# Patient Record
Sex: FEMALE | Race: WHITE | NOT HISPANIC OR LATINO | Employment: FULL TIME | URBAN - METROPOLITAN AREA
[De-identification: names, ages, dates, MRNs, and addresses within clinical notes are randomized per-mention and may not be internally consistent; named-entity substitution may affect disease eponyms.]

---

## 2017-01-24 ENCOUNTER — GENERIC CONVERSION - ENCOUNTER (OUTPATIENT)
Dept: OTHER | Facility: OTHER | Age: 57
End: 2017-01-24

## 2017-03-16 DIAGNOSIS — Z12.31 ENCOUNTER FOR SCREENING MAMMOGRAM FOR MALIGNANT NEOPLASM OF BREAST: ICD-10-CM

## 2017-03-16 DIAGNOSIS — R92.2 INCONCLUSIVE MAMMOGRAM: ICD-10-CM

## 2017-05-08 ENCOUNTER — ALLSCRIPTS OFFICE VISIT (OUTPATIENT)
Dept: OTHER | Facility: OTHER | Age: 57
End: 2017-05-08

## 2017-05-08 ENCOUNTER — GENERIC CONVERSION - ENCOUNTER (OUTPATIENT)
Dept: OTHER | Facility: OTHER | Age: 57
End: 2017-05-08

## 2017-05-08 DIAGNOSIS — Z78.0 ASYMPTOMATIC MENOPAUSAL STATE: ICD-10-CM

## 2017-05-20 ENCOUNTER — HOSPITAL ENCOUNTER (OUTPATIENT)
Dept: RADIOLOGY | Age: 57
Discharge: HOME | End: 2017-05-20
Payer: COMMERCIAL

## 2017-05-20 DIAGNOSIS — Z78.0 ASYMPTOMATIC MENOPAUSAL STATE: ICD-10-CM

## 2017-05-20 PROCEDURE — 77080 DXA BONE DENSITY AXIAL: CPT

## 2017-06-08 ENCOUNTER — GENERIC CONVERSION - ENCOUNTER (OUTPATIENT)
Dept: OTHER | Facility: OTHER | Age: 57
End: 2017-06-08

## 2017-06-19 ENCOUNTER — GENERIC CONVERSION - ENCOUNTER (OUTPATIENT)
Dept: OTHER | Facility: OTHER | Age: 57
End: 2017-06-19

## 2017-08-24 ENCOUNTER — TRANSCRIBE ORDERS (OUTPATIENT)
Dept: ADMINISTRATIVE | Facility: HOSPITAL | Age: 57
End: 2017-08-24

## 2017-08-24 ENCOUNTER — GENERIC CONVERSION - ENCOUNTER (OUTPATIENT)
Dept: OTHER | Facility: OTHER | Age: 57
End: 2017-08-24

## 2017-08-24 DIAGNOSIS — M54.12 BRACHIAL NEURITIS OR RADICULITIS NOS: Primary | ICD-10-CM

## 2017-08-31 ENCOUNTER — HOSPITAL ENCOUNTER (OUTPATIENT)
Dept: RADIOLOGY | Age: 57
Discharge: HOME | End: 2017-08-31
Payer: COMMERCIAL

## 2017-08-31 DIAGNOSIS — M54.12 BRACHIAL NEURITIS OR RADICULITIS NOS: ICD-10-CM

## 2017-08-31 PROCEDURE — 72141 MRI NECK SPINE W/O DYE: CPT

## 2017-12-28 ENCOUNTER — GENERIC CONVERSION - ENCOUNTER (OUTPATIENT)
Dept: OTHER | Facility: OTHER | Age: 57
End: 2017-12-28

## 2018-01-11 NOTE — PROGRESS NOTES
Assessment    1  Acute sinusitis, recurrence not specified, unspecified location (461 9) (J01 90)    Plan  Acute sinusitis, recurrence not specified, unspecified location    · Beconase AQ 42 MCG/SPRAY Nasal Suspension; USE 2 SPRAYS IN EACH  NOSTRIL ONCE DAILY   · Doxycycline Hyclate 100 MG Oral Capsule; Take 1 capsule twice daily    Discussion/Summary    Doxy provided given multiple abx allergies  she requested beconase nasal spray as this worked in the past    Possible side effects of new medications were reviewed with the patient/guardian today  The treatment plan was reviewed with the patient/guardian  The patient/guardian understands and agrees with the treatment plan     Follow Up with your Primary Care Provider in 1-2 days  If your symptoms worsen follow up at the nearest [place] [address] Emergency Room  History of Present Illness  Patient currently in [place], using an older model desktop computer, video connection was not available and the visit was conducted via phone call  She has had sinus congestion and worsening frontal and maxillary sinus pressure for the past 5 days  She has low grade temps and occasional chills  She denies chest tightness or sob  Mild dry cough  She has a hx of recurrent sinusitis and has had sinus surgery in the past      Review of Systems    Constitutional: as noted in HPI    ENT: as noted in HPI  Cardiovascular: no complaints of slow or fast heart rate, no chest pain, no palpitations, no leg claudication or lower extremity edema  Respiratory: cough, but as noted in HPI  Active Problems    1  Anxiety (300 00) (F41 9)   2  Chronic joint pain (719 40,338 29) (M25 50,G89 29)    Past Medical History    1  History of Acute recurrent sinusitis (461 9) (J01 91)   2  History of Back disorder (724 9) (M53 9)   3  History of Colon cancer screening (V76 51) (Z12 11)   4  History of Encounter for screening mammogram for malignant neoplasm of breast   (V76 12) (Z12 31)   5   H/O neck disorder (V13 59) (Z87 39)   6  History of arthritis (V13 4) (Z87 39)   7  History of skin disorder (V13 3) (Z87 2)   8  History of Lipids abnormal (272 9) (E78 89)   9  History of Neck pain (723 1) (M54 2)   10  History of Screening cholesterol level (V77 91) (Z13 220)   11  History of Screening for breast cancer (V76 10) (Z12 39)   12  History of Screening for colon cancer (V76 51) (Z12 11)   13  History of Screening for depression (V79 0) (Z13 89)    Family History  Mother    1  Family history of Lupus   2  Family history of Systemic lupus erythematosus  Father    3  Family history of Lung cancer    Social History    · Caffeine use (V49 89) (F15 90)   · Denied: History of Drug use   · Exercises regularly   ·    · Never a smoker   · Occupation   · Social alcohol use (Z78 9)   · Two children    Surgical History    1  History of  Section   2  History of Exploratory Laparotomy   3  History of Hernia Repair   4  History of Hysterectomy   5  History of Rotator Cuff Repair    Current Meds   1  ALPRAZolam 0 5 MG Oral Tablet; TAKE 1 TABLET DAILY AS NEEDED; Therapy: 95Ngm0565 to (Evaluate:08Xaf9059); Last Rx:57Zex5235 Ordered   2  Cefdinir 300 MG Oral Capsule; TAKE 1 CAPSULE EVERY 12 HOURS DAILY; Therapy: 82Sft6209 to (Chela Wakefield)  Requested for: 51Hae1775; Last   Rx:76Wra4722 Ordered   3  Hydrocodone-Acetaminophen 5-325 MG Oral Tablet; Therapy: (Recorded:65Ial7906) to Recorded   4  PreviDent 5000 Sensitive 1 1-5 % Dental Paste; USE AS DIRECTED; Therapy: 2013 to Recorded   5  Qnasl 80 MCG/ACT Nasal Aerosol Solution; 1 spray each nostril once daily; Therapy: 10Odo0634 to (Last Rx:54Vlv6517)  Requested for: 83Rri2775 Ordered    Allergies    1  Avelox TABS   2  Erythromycin Base TABS   3  Novocain SOLN   4  Penicillins    5   IVP Dye    Observations Made  No distress in her voice, able to complete full sentences       Future Appointments    Date/Time Provider Specialty Site   2017 04:30 PM Malachi Kraus, [facility] Internal Medicine [facility]     Signatures   Electronically signed by : Cecil Decker DO; Jan 24 2017  3:48PM EST                       (Author)

## 2018-01-13 VITALS
SYSTOLIC BLOOD PRESSURE: 128 MMHG | WEIGHT: 113 LBS | HEIGHT: 61 IN | BODY MASS INDEX: 21.34 KG/M2 | DIASTOLIC BLOOD PRESSURE: 70 MMHG

## 2018-01-13 NOTE — MISCELLANEOUS
Message   Recorded as Task   Date: 05/23/2017 10:59 AM, Created By: Hao Montenegro   Task Name: Result Follow Up   Assigned To: Vianey Gill   Regarding Patient: Katelyn Melara, Status: In Progress   CommentTiffanieia Blind - 23 May 2017 10:59 AM     TASK CREATED  Baseline DEXA with reassuring results - low bone mineral density is noted in the hip, however the lumbar spine and femur density are within normal limits  The patient has no modifiable risk factors for osteoporosis  Please recommend contiuing Ca++ 1000-1200mg/day with vit D and continued freq weight bearing exercise  Consider repeating DEXA in 2-4 yrs  Thanks   Ayse Crawford - 23 May 2017 11:01 AM     TASK IN PROGRESS   Ayse Crawford - 23 May 2017 11:08 AM     TASK EDITED  pt informed dexa results        Active Problems    1  Acute sinusitis, recurrence not specified, unspecified location (461 9) (J01 90)   2  Anxiety (300 00) (F41 9)   3  Chronic joint pain (719 40,338 29) (M25 50,G89 29)   4  Dense breasts (793 82) (R92 2)   5  Encounter for gynecological examination without abnormal finding (V72 31) (Z01 419)   6  Encounter for screening mammogram for malignant neoplasm of breast (V76 12)   (Z12 31)   7  Hot flashes (782 62) (R23 2)   8  Postmenopausal status (V49 81) (Z78 0)   9  Vaginal dryness (625 8) (N89 8)    Current Meds   1  ALPRAZolam 0 5 MG Oral Tablet; TAKE 1 TABLET DAILY AS NEEDED; Therapy: 78Act5202 to (Evaluate:91Fhr8256); Last Rx:86Kus4308 Ordered   2  Fluticasone Propionate 50 MCG/ACT Nasal Suspension (Flonase Allergy Relief); SPRAY   TWO SPRAYS INTO EACH NOSTRIL ONCE DAILY; Therapy: 22PSW1287 to (Last Veryl Comment)  Requested for: 31IBH5226 Ordered   3  Hydrocodone-Acetaminophen 5-325 MG Oral Tablet; Therapy: (Recorded:43Iyx5192) to Recorded   4  PreviDent 5000 Sensitive 1 1-5 % Dental Paste; USE AS DIRECTED; Therapy: 22Nov2013 to Recorded   5  TiZANidine HCl - 2 MG Oral Tablet;    Therapy: (Recorded:81Wzm2672) to Recorded    Allergies    1  Avelox TABS   2  Erythromycin Base TABS   3  Novocain SOLN   4  Penicillins    5   IVP Dye    Signatures   Electronically signed by : Gelacio Tompkins, ; May 23 2017 11:10AM EST                       (Author)

## 2018-01-15 NOTE — MISCELLANEOUS
Message   Recorded as Task   Date: 05/23/2017 10:59 AM, Created By: Ghada Ernandez   Task Name: Result Follow Up   Assigned To: Naresh Velásquez   Regarding Patient: Nicole Carlson, Status: In Progress   Cleo Velásquez - 23 May 2017 10:59 AM     TASK CREATED  Baseline DEXA with reassuring results - low bone mineral density is noted in the hip, however the lumbar spine and femur density are within normal limits  The patient has no modifiable risk factors for osteoporosis  Please recommend contiuing Ca++ 1000-1200mg/day with vit D and continued freq weight bearing exercise  Consider repeating DEXA in 2-4 yrs  Thanks   Ayse Crawford - 23 May 2017 11:01 AM     TASK IN PROGRESS   Ayse Crawford - 23 May 2017 11:08 AM     TASK EDITED  pt informed dexa results        Active Problems    1  Acute sinusitis, recurrence not specified, unspecified location (461 9) (J01 90)   2  Anxiety (300 00) (F41 9)   3  Chronic joint pain (719 40,338 29) (M25 50,G89 29)   4  Dense breasts (793 82) (R92 2)   5  Encounter for gynecological examination without abnormal finding (V72 31) (Z01 419)   6  Encounter for screening mammogram for malignant neoplasm of breast (V76 12)   (Z12 31)   7  Hot flashes (782 62) (R23 2)   8  Postmenopausal status (V49 81) (Z78 0)   9  Vaginal dryness (625 8) (N89 8)    Current Meds   1  ALPRAZolam 0 5 MG Oral Tablet; TAKE 1 TABLET DAILY AS NEEDED; Therapy: 69Ksy2996 to (Evaluate:23Uxs0486); Last Rx:15Vgn8798 Ordered   2  Fluticasone Propionate 50 MCG/ACT Nasal Suspension (Flonase Allergy Relief); SPRAY   TWO SPRAYS INTO EACH NOSTRIL ONCE DAILY; Therapy: 55GFO8639 to (Last [facility])  Requested for: 40ION8446 Ordered   3  Hydrocodone-Acetaminophen 5-325 MG Oral Tablet; Therapy: (Recorded:57Rhy2114) to Recorded   4  PreviDent 5000 Sensitive 1 1-5 % Dental Paste; USE AS DIRECTED; Therapy: 22Nov2013 to Recorded   5  TiZANidine HCl - 2 MG Oral Tablet;    Therapy: (Recorded:35Ehg8425) to Recorded    Allergies    1  Avelox TABS   2  Erythromycin Base TABS   3  Novocain SOLN   4  Penicillins    5   IVP Dye    Signatures   Electronically signed by : Juve Herzog, ; May 23 2017 11:09AM EST                       (Author)

## 2018-07-12 ENCOUNTER — OFFICE VISIT (OUTPATIENT)
Dept: INTERNAL MEDICINE CLINIC | Facility: CLINIC | Age: 58
End: 2018-07-12
Payer: COMMERCIAL

## 2018-07-12 VITALS
HEART RATE: 70 BPM | BODY MASS INDEX: 21.02 KG/M2 | TEMPERATURE: 98.3 F | WEIGHT: 114.2 LBS | HEIGHT: 62 IN | OXYGEN SATURATION: 98 % | DIASTOLIC BLOOD PRESSURE: 80 MMHG | RESPIRATION RATE: 20 BRPM | SYSTOLIC BLOOD PRESSURE: 116 MMHG

## 2018-07-12 DIAGNOSIS — F41.9 ANXIETY: Primary | ICD-10-CM

## 2018-07-12 DIAGNOSIS — G47.00 INSOMNIA, UNSPECIFIED TYPE: ICD-10-CM

## 2018-07-12 DIAGNOSIS — E78.89 LIPIDS ABNORMAL: ICD-10-CM

## 2018-07-12 PROBLEM — S46.819A STRAIN OF SUPRASPINATUS MUSCLE OR TENDON: Status: ACTIVE | Noted: 2018-07-12

## 2018-07-12 PROBLEM — M54.12 CERVICAL RADICULOPATHY: Status: ACTIVE | Noted: 2017-07-12

## 2018-07-12 PROBLEM — M47.812 CERVICAL SPONDYLOSIS WITHOUT MYELOPATHY: Status: ACTIVE | Noted: 2017-06-08

## 2018-07-12 PROBLEM — M25.519 SHOULDER JOINT PAIN: Status: ACTIVE | Noted: 2018-07-12

## 2018-07-12 PROBLEM — R92.2 DENSE BREASTS: Status: ACTIVE | Noted: 2017-05-08

## 2018-07-12 PROBLEM — R23.2 HOT FLASHES: Status: ACTIVE | Noted: 2017-05-08

## 2018-07-12 PROBLEM — N89.8 VAGINAL DRYNESS: Status: ACTIVE | Noted: 2017-05-08

## 2018-07-12 PROBLEM — M54.2 NECK PAIN: Status: ACTIVE | Noted: 2017-06-08

## 2018-07-12 PROBLEM — R92.30 DENSE BREASTS: Status: ACTIVE | Noted: 2017-05-08

## 2018-07-12 PROCEDURE — 99214 OFFICE O/P EST MOD 30 MIN: CPT | Performed by: NURSE PRACTITIONER

## 2018-07-12 RX ORDER — ALPRAZOLAM 0.25 MG/1
0.25 TABLET ORAL DAILY PRN
Qty: 30 TABLET | Refills: 0 | Status: SHIPPED | OUTPATIENT
Start: 2018-07-12 | End: 2018-11-29

## 2018-07-12 RX ORDER — ESCITALOPRAM OXALATE 5 MG/1
5 TABLET ORAL DAILY
Qty: 30 TABLET | Refills: 1 | Status: SHIPPED | OUTPATIENT
Start: 2018-07-12 | End: 2018-08-30 | Stop reason: SDUPTHER

## 2018-07-12 RX ORDER — ZOLPIDEM TARTRATE 5 MG/1
5 TABLET ORAL
Qty: 30 TABLET | Refills: 0 | Status: SHIPPED | OUTPATIENT
Start: 2018-07-12 | End: 2018-08-30 | Stop reason: SDUPTHER

## 2018-07-12 NOTE — PATIENT INSTRUCTIONS
Will check labs  Start Lexapro 5 mg daily  Start Xanax 0 25 mg daily as needed for anxiety  Restart Ambien 5 mg q h s  As needed  Do not take Ambien and xanax together    Discussed with patient the Xanax will only be a temporary prescription until the long-term medication becomes effective  Goal is once the anxiety is under control she will be able to stop the Xanax and the Ambien    Patient to follow up in one month, sooner if needed

## 2018-07-12 NOTE — PROGRESS NOTES
Assessment/Plan:    Patient presents to establish care and follow-up on anxiety insomnia  She was last seen in 2016  She reports she has been having increased anxiety and insomnia the past few months  Patient previously on Ambien in the past and is requesting restart  She reports she is only getting about three 4 hr of sleep a night  She has tried over-the-counter melatonin without success  Additionally patient reports that her anxiety has been increasing  She is hesitant to start long-term medication due to side effects and she reports that she has been on them in the past without success  She is agreeable to starting a low-dose Lexapro  Will start 5 mg daily  No increased at this time  Additionally will start Xanax 0 25 mg as needed on a daily basis  Patient was informed this medication is only to be prescribed in the interim until her long-acting medication becomes effective  Goal is to decrease anxiety in hopes that will improve her insomnia  Discussed with patient she should not take Xanax and Ambien at the same time  She is to continue with good sleep hygiene  Will update routine labs and patient to follow up in one month, sooner if needed  Patient's ran on PDMP prior to rx  Diagnoses and all orders for this visit:    Anxiety  -     CBC and differential; Future  -     Comprehensive metabolic panel; Future  -     TSH, 3rd generation with Free T4 reflex; Future    Lipids abnormal  -     Lipid panel; Future  -     TSH, 3rd generation with Free T4 reflex; Future        Subjective:      Patient ID: Stevo Muhammad is a 56 y o  female  Patient presents to reestablish care/ follow-up anxiety and insomnia  Patient reports that she has been having increased anxiety, overwhelmed with family stress and work  She is currently working and going back to school for another degree as well  She has two teenagers and a supportive   Anxiety   Presents for follow-up visit   Symptoms include depressed mood, insomnia, nervous/anxious behavior, panic and restlessness  Patient reports no chest pain, decreased concentration, dizziness, excessive worry, irritability, nausea, obsessions, palpitations, shortness of breath or suicidal ideas  Symptoms occur most days  The severity of symptoms is moderate  The quality of sleep is poor  Nighttime awakenings: one to two  Sydni Elmore is a 56 y o  female who complains of insomnia  Onset was a few years ago  Patient describes symptoms as frequent night time awakening, difficulty falling asleep and non-restful sleep  Patient has found no relief with going to sleep at the same time each night and melatonin use  Associated symptoms include: anxiety and fatigue  Patient denies irritability  Symptoms have gradually worsened  Patient has used Ambien in the past successfully and would like to restart    The following portions of the patient's history were reviewed and updated as appropriate: allergies, current medications, past family history, past medical history, past social history, past surgical history and problem list     Review of Systems   Constitutional: Positive for fatigue  Negative for chills, fever and irritability  Respiratory: Negative for cough, chest tightness, shortness of breath and wheezing  Cardiovascular: Negative for chest pain, palpitations and leg swelling  Gastrointestinal: Negative for constipation, diarrhea, nausea and vomiting  Neurological: Negative for dizziness, syncope and light-headedness  Psychiatric/Behavioral: Positive for sleep disturbance  Negative for behavioral problems, decreased concentration, dysphoric mood, self-injury and suicidal ideas  The patient is nervous/anxious and has insomnia  The patient is not hyperactive            Past Medical History:   Diagnosis Date    Arthritis     Last Assessed:  4/17/15    Herniated disc, cervical     Lipids abnormal     Last Assessed:  8/5/16       No current outpatient prescriptions on file  Allergies   Allergen Reactions    Ampicillin Other (See Comments)     RASH    Epinephrine     Erythromycin Other (See Comments)    Levofloxacin     Other Other (See Comments)    Penicillins     Procaine      Annotation - [id number]: When used with Epi    Moxifloxacin Rash     Swelling in lymphnodes       Social History   Past Surgical History:   Procedure Laterality Date     SECTION      EXPLORATORY LAPAROTOMY  1979    HERNIA REPAIR      Last Assessed:  4/17/15    HYSTERECTOMY      Partial    ROTATOR CUFF REPAIR      Last Assessed:  4/17/15     Family History   Problem Relation Age of Onset    Lupus Mother     Other Mother         Rheumatic disease    Lung cancer Father     Blindness Brother     Heart attack Brother     Stroke Brother        Objective:  /80 (BP Location: Left arm, Patient Position: Sitting, Cuff Size: Adult)   Pulse 70   Temp 98 3 °F (36 8 °C) (Oral)   Resp 20   Ht 5' 1 5" (1 562 m)   Wt 51 8 kg (114 lb 3 2 oz)   SpO2 98%   BMI 21 23 kg/m²      Physical Exam   Constitutional: She is oriented to person, place, and time  She appears well-developed and well-nourished  No distress  Neck: No thyromegaly present  Cardiovascular: Normal rate and regular rhythm  Pulmonary/Chest: Effort normal and breath sounds normal  No respiratory distress  She has no wheezes  Neurological: She is alert and oriented to person, place, and time  No focal deficit   Skin: Skin is warm and dry  No rash noted  Psychiatric: Her speech is normal and behavior is normal  Judgment and thought content normal  Her mood appears anxious  Her affect is not angry  She is not agitated and not aggressive  Thought content is not paranoid  She does not exhibit a depressed mood  She expresses no homicidal and no suicidal ideation  Nursing note and vitals reviewed

## 2018-07-21 ENCOUNTER — TELEPHONE (OUTPATIENT)
Dept: INTERNAL MEDICINE CLINIC | Age: 58
End: 2018-07-21

## 2018-07-21 NOTE — TELEPHONE ENCOUNTER
Pt called  I saw pt and started lexapro, xanax and ambien  She reports that her anxiety is doing better, not needing to take xanax often  Anxiety if contributing to insomnia  D/w pt increasing lexapro to 10mg daily however she is hesitant and wants to wait  No increase in [place] at this time  Offered pt a sooner follow-up than 8/30  She will call back if she wants sooner appt  No quesitons  Will done task

## 2018-07-21 NOTE — TELEPHONE ENCOUNTER
Patient was seen a week ago, given Ambien for sleep 5 mg, also Lexapro and Xanax  Patient is having great difficulty sleeping  She is able to fall asleep but cannot stay asleep for very long

## 2018-08-30 ENCOUNTER — OFFICE VISIT (OUTPATIENT)
Dept: INTERNAL MEDICINE CLINIC | Facility: CLINIC | Age: 58
End: 2018-08-30
Payer: COMMERCIAL

## 2018-08-30 VITALS
TEMPERATURE: 98.1 F | HEIGHT: 62 IN | BODY MASS INDEX: 20.98 KG/M2 | DIASTOLIC BLOOD PRESSURE: 88 MMHG | HEART RATE: 81 BPM | OXYGEN SATURATION: 99 % | SYSTOLIC BLOOD PRESSURE: 130 MMHG | WEIGHT: 114 LBS

## 2018-08-30 DIAGNOSIS — G47.00 INSOMNIA, UNSPECIFIED TYPE: ICD-10-CM

## 2018-08-30 DIAGNOSIS — F41.9 ANXIETY: Primary | ICD-10-CM

## 2018-08-30 DIAGNOSIS — G47.09 OTHER INSOMNIA: ICD-10-CM

## 2018-08-30 PROBLEM — M47.817 LUMBOSACRAL SPONDYLOSIS WITHOUT MYELOPATHY: Status: ACTIVE | Noted: 2018-08-08

## 2018-08-30 PROCEDURE — 99214 OFFICE O/P EST MOD 30 MIN: CPT | Performed by: NURSE PRACTITIONER

## 2018-08-30 RX ORDER — ESCITALOPRAM OXALATE 5 MG/1
5 TABLET ORAL DAILY
Qty: 30 TABLET | Refills: 3 | Status: SHIPPED | OUTPATIENT
Start: 2018-08-30 | End: 2018-11-29 | Stop reason: SDUPTHER

## 2018-08-30 RX ORDER — MELOXICAM 7.5 MG/1
7.5 TABLET ORAL DAILY
COMMUNITY
End: 2018-11-29

## 2018-08-30 RX ORDER — ZOLPIDEM TARTRATE 5 MG/1
5 TABLET ORAL
Qty: 30 TABLET | Refills: 0 | Status: SHIPPED | OUTPATIENT
Start: 2018-08-30 | End: 2018-09-28 | Stop reason: SDUPTHER

## 2018-08-30 RX ORDER — HYDROCODONE BITARTRATE AND ACETAMINOPHEN 5; 325 MG/1; MG/1
1 TABLET ORAL DAILY PRN
COMMUNITY
Start: 2018-08-08 | End: 2021-03-17

## 2018-08-30 NOTE — PROGRESS NOTES
Assessment/Plan:    Anxiety:  Continue Lexapro 5 mg daily  Patient has reported improvement in her symptoms with low-dose Lexapro  Continue the stressing techniques  Patient is using Xanax 0 25 mg a few times a month  Insomnia:  Continue with Ambien 5 mg q h s  As needed  Continue with good sleep hygiene  A controlled substance contract was signed  Keep her follow-up with her neurosurgeon for back pain and possible steroid injection  Labs reviewed  Patient to follow up in four months, sooner if needed     Diagnoses and all orders for this visit:    Anxiety  -     escitalopram (LEXAPRO) 5 mg tablet; Take 1 tablet (5 mg total) by mouth daily    Other insomnia    Insomnia, unspecified type  -     zolpidem (AMBIEN) 5 mg tablet; Take 1 tablet (5 mg total) by mouth daily at bedtime as needed for sleep    Other orders  -     Cancel: Ambulatory referral to Obstetrics / Gynecology; Future  -     HYDROcodone-acetaminophen (XODOL) 5-300 MG per tablet; daily as needed  -     meloxicam (MOBIC) 7 5 mg tablet; Take 7 5 mg by mouth daily        Subjective:      Patient ID: Urvashi Vance is a 66 y o  female  Patient presents for a six week follow-up for anxiety and insomnia  Patient is doing well with the addition of Lexapro 5 mg daily  She is only taking her Xanax 0 25 mg a few times a month  Anxiety   Presents for follow-up visit  Symptoms include insomnia, nervous/anxious behavior and restlessness  Patient reports no chest pain, decreased concentration, depressed mood, dizziness, excessive worry (improved), irritability, nausea, palpitations, panic, shortness of breath or suicidal ideas  Symptoms occur occasionally  The severity of symptoms is mild  The quality of sleep is fair  Insomnia  Patient complains of insomnia  Onset was a few years ago  Patient describes symptoms as frequent night time awakening, difficulty falling asleep and non-restful sleep     Patient has tried good sleep hygiene, over-the-counter sleep aid and melatonin without improvement symptoms  Patient has found moderate relief with prescription sleep aid, ambien 5mg  Associated symptoms include: anxiety  Patient denies depression, fatigue, irritability, snoring and stress  Symptoms have gradually improved  Patient is being followed by Neurosurgery for back and neck pain  She is due to receive a steroid injection  The following portions of the patient's history were reviewed and updated as appropriate: allergies, current medications, past family history, past medical history, past social history, past surgical history and problem list     Review of Systems   Constitutional: Negative for chills, fatigue, fever and irritability  Respiratory: Negative for chest tightness, shortness of breath and wheezing  Cardiovascular: Negative for chest pain and palpitations  Gastrointestinal: Negative for abdominal pain, constipation, diarrhea, nausea and vomiting  Musculoskeletal: Positive for back pain and neck pain  Skin: Negative for rash  Neurological: Negative for dizziness and light-headedness  Psychiatric/Behavioral: Positive for sleep disturbance  Negative for agitation, behavioral problems, decreased concentration, dysphoric mood and suicidal ideas  The patient is nervous/anxious and has insomnia            Past Medical History:   Diagnosis Date    Arthritis     Last Assessed:  4/17/15    Herniated disc, cervical     Lipids abnormal     Last Assessed:  8/5/16         Current Outpatient Prescriptions:     ALPRAZolam (XANAX) 0 25 mg tablet, Take 1 tablet (0 25 mg total) by mouth daily as needed for anxiety, Disp: 30 tablet, Rfl: 0    escitalopram (LEXAPRO) 5 mg tablet, Take 1 tablet (5 mg total) by mouth daily, Disp: 30 tablet, Rfl: 3    HYDROcodone-acetaminophen (XODOL) 5-300 MG per tablet, daily as needed, Disp: , Rfl:     meloxicam (MOBIC) 7 5 mg tablet, Take 7 5 mg by mouth daily, Disp: , Rfl:     zolpidem (AMBIEN) 5 mg tablet, Take 1 tablet (5 mg total) by mouth daily at bedtime as needed for sleep, Disp: 30 tablet, Rfl: 0    Allergies   Allergen Reactions    Ampicillin Other (See Comments)     RASH    Epinephrine     Erythromycin Other (See Comments)    Levofloxacin     Other Other (See Comments)    Penicillins     Procaine      Annotation - [id number]: When used with Epi    Moxifloxacin Rash     Swelling in lymphnodes       Social History   Past Surgical History:   Procedure Laterality Date     SECTION      EXPLORATORY LAPAROTOMY      HERNIA REPAIR      Last Assessed:  4/17/15    HYSTERECTOMY      Partial    ROTATOR CUFF REPAIR      Last Assessed:  4/17/15     Family History   Problem Relation Age of Onset    Lupus Mother     Other Mother         Rheumatic disease    Lung cancer Father     Blindness Brother     Heart attack Brother     Stroke Brother        Objective:  /88 (BP Location: Right arm, Patient Position: Sitting, Cuff Size: Adult)   Pulse 81   Temp 98 1 °F (36 7 °C) (Oral)   Ht 5' 1 5" (1 562 m)   Wt 51 7 kg (114 lb)   SpO2 99%   BMI 21 19 kg/m²      Physical Exam   Constitutional: She is oriented to person, place, and time  She appears well-developed and well-nourished  No distress  Neck: No thyromegaly present  Cardiovascular: Normal rate and regular rhythm  Pulmonary/Chest: Effort normal and breath sounds normal  No respiratory distress  She has no wheezes  Neurological: She is alert and oriented to person, place, and time  No focal deficit   Skin: Skin is warm and dry  No rash noted  Psychiatric: Her speech is normal and behavior is normal  Judgment and thought content normal  Her mood appears not anxious  Her affect is not angry  She is not agitated and not aggressive  Thought content is not paranoid  She does not exhibit a depressed mood  She expresses no homicidal and no suicidal ideation  Nursing note and vitals reviewed

## 2018-09-28 DIAGNOSIS — G47.00 INSOMNIA, UNSPECIFIED TYPE: ICD-10-CM

## 2018-10-01 RX ORDER — ZOLPIDEM TARTRATE 5 MG/1
5 TABLET ORAL
Qty: 30 TABLET | Refills: 0 | Status: SHIPPED | OUTPATIENT
Start: 2018-10-01 | End: 2018-11-01 | Stop reason: SDUPTHER

## 2018-11-01 DIAGNOSIS — G47.00 INSOMNIA, UNSPECIFIED TYPE: ICD-10-CM

## 2018-11-01 NOTE — TELEPHONE ENCOUNTER
Therosas Spence per PDMP site    Last appt, 8/30/18    Next appt, none pending     Pt due for December f/u  Told to f/u in 4M

## 2018-11-02 RX ORDER — ZOLPIDEM TARTRATE 5 MG/1
5 TABLET ORAL
Qty: 30 TABLET | Refills: 0 | Status: SHIPPED | OUTPATIENT
Start: 2018-11-02 | End: 2018-11-29 | Stop reason: SDUPTHER

## 2018-11-02 NOTE — TELEPHONE ENCOUNTER
30 days sent to pharmacy  Please call and have pt schedule December appt  Told to come back in 4M  Thank you!

## 2018-11-05 NOTE — TELEPHONE ENCOUNTER
Spoke with patient  She thought she had an appointment scheduled in January  Pt was in a meeting and asked to call back and schedule after!

## 2018-11-29 ENCOUNTER — OFFICE VISIT (OUTPATIENT)
Dept: INTERNAL MEDICINE CLINIC | Facility: CLINIC | Age: 58
End: 2018-11-29
Payer: COMMERCIAL

## 2018-11-29 VITALS
HEART RATE: 85 BPM | OXYGEN SATURATION: 98 % | HEIGHT: 62 IN | DIASTOLIC BLOOD PRESSURE: 90 MMHG | WEIGHT: 113 LBS | SYSTOLIC BLOOD PRESSURE: 138 MMHG | BODY MASS INDEX: 20.8 KG/M2 | TEMPERATURE: 98 F

## 2018-11-29 DIAGNOSIS — F41.9 ANXIETY: ICD-10-CM

## 2018-11-29 DIAGNOSIS — G47.00 INSOMNIA, UNSPECIFIED TYPE: Primary | ICD-10-CM

## 2018-11-29 DIAGNOSIS — Z91.89 ENCOUNTER FOR HEPATITIS C VIRUS SCREENING TEST FOR HIGH RISK PATIENT: ICD-10-CM

## 2018-11-29 DIAGNOSIS — Z11.59 ENCOUNTER FOR HEPATITIS C VIRUS SCREENING TEST FOR HIGH RISK PATIENT: ICD-10-CM

## 2018-11-29 PROCEDURE — 99214 OFFICE O/P EST MOD 30 MIN: CPT | Performed by: NURSE PRACTITIONER

## 2018-11-29 RX ORDER — ESCITALOPRAM OXALATE 5 MG/1
5 TABLET ORAL DAILY
Qty: 90 TABLET | Refills: 1 | Status: SHIPPED | OUTPATIENT
Start: 2018-11-29 | End: 2019-06-07 | Stop reason: SDUPTHER

## 2018-11-29 RX ORDER — ZOLPIDEM TARTRATE 5 MG/1
5 TABLET ORAL
Qty: 30 TABLET | Refills: 0 | Status: SHIPPED | OUTPATIENT
Start: 2018-11-29 | End: 2019-12-16 | Stop reason: SDUPTHER

## 2018-11-29 NOTE — PROGRESS NOTES
Assessment/Plan:    Insomnia:  Stable on 5 mg Ambien as needed  Continue good sleep hygiene  Controlled substance contract on file  Patient to follow up in four months  Anxiety:  Improved with lexapro 5 mg  Patient is no longer taking Xanax  Back pain:  Patient keep her follow-up with Neurosurgery  Continue with home stretching exercises  Patient works with physical therapist   She has developed her exercise routine  Patient is due for hep C screening  Ordered  Patient follow-up in four months given she is on a controlled substance sooner if needed  Diagnoses and all orders for this visit:    Insomnia, unspecified type  -     zolpidem (AMBIEN) 5 mg tablet; Take 1 tablet (5 mg total) by mouth daily at bedtime as needed for sleep    Encounter for hepatitis C virus screening test for high risk patient  -     Hepatitis C antibody; Future    Anxiety  -     escitalopram (LEXAPRO) 5 mg tablet; Take 1 tablet (5 mg total) by mouth daily        Subjective:      Patient ID: Armando Hill is a 66 y o  female  Anxiety   Presents for follow-up visit  Symptoms include insomnia and nervous/anxious behavior  Patient reports no chest pain, decreased concentration, depressed mood, dizziness, excessive worry (improved), irritability, nausea, palpitations, panic, restlessness, shortness of breath or suicidal ideas  Symptoms occur occasionally  The severity of symptoms is mild  The quality of sleep is good  Pt is currently taking Lexapro  Tolerating well  Only side effect experiences feet sweating  Armando Hill is a 56 y o  female who complains of insomnia  Onset was a few years ago  Patient describes symptoms as frequent night time awakening, difficulty falling asleep and non-restful sleep  Patient has found no relief with going to sleep at the same time each night and melatonin use  Associated symptoms include: anxiety and fatigue   Patient denies irritability, frequent nighttim urination, depression Symptoms have improved  Patient is currently taking ambien prn 5mg with improvement in her symptoms  Pt is following with [facility] for back pain  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    The following portions of the patient's history were reviewed and updated as appropriate: allergies, current medications, past family history, past medical history, past social history, past surgical history and problem list     Review of Systems   Constitutional: Negative for irritability  Respiratory: Negative for shortness of breath  Cardiovascular: Negative for chest pain and palpitations  Gastrointestinal: Negative for nausea  Neurological: Negative for dizziness  Psychiatric/Behavioral: Negative for decreased concentration and suicidal ideas  The patient is nervous/anxious and has insomnia            Past Medical History:   Diagnosis Date    Arthritis     Last Assessed:  4/17/15    Herniated disc, cervical     Lipids abnormal     Last Assessed:  16         Current Outpatient Prescriptions:     escitalopram (LEXAPRO) 5 mg tablet, Take 1 tablet (5 mg total) by mouth daily, Disp: 90 tablet, Rfl: 1    HYDROcodone-acetaminophen (XODOL) 5-300 MG per tablet, daily as needed, Disp: , Rfl:     zolpidem (AMBIEN) 5 mg tablet, Take 1 tablet (5 mg total) by mouth daily at bedtime as needed for sleep, Disp: 30 tablet, Rfl: 0    Allergies   Allergen Reactions    Ampicillin Other (See Comments)     RASH    Epinephrine     Erythromycin Other (See Comments)    Levofloxacin     Other Other (See Comments)    Penicillins     Procaine      Annotation - [id number]: When used with Epi    Moxifloxacin Rash     Swelling in lymphnodes       Social History   Past Surgical History:   Procedure Laterality Date     SECTION      EPIDURAL BLOCK INJECTION      back   [address]      Last Assessed:  4/17/15    HYSTERECTOMY      Partial    ROTATOR CUFF REPAIR      Last Assessed:  4/17/15     Family History   Problem Relation Age of Onset    Lupus Mother     Other Mother         Rheumatic disease    Lung cancer Father     Blindness Brother     Heart attack Brother     Stroke Brother        Objective:  /90 (BP Location: Left arm, Patient Position: Sitting, Cuff Size: Adult)   Pulse 85   Temp 98 °F (36 7 °C) (Oral)   Ht 5' 1 5" (1 562 m)   Wt 51 3 kg (113 lb)   SpO2 98%   BMI 21 01 kg/m²      Physical Exam

## 2018-12-24 LAB — HCV AB SER-ACNC: NEGATIVE

## 2019-01-21 ENCOUNTER — OFFICE VISIT (OUTPATIENT)
Dept: INTERNAL MEDICINE CLINIC | Facility: CLINIC | Age: 59
End: 2019-01-21
Payer: COMMERCIAL

## 2019-01-21 VITALS
HEART RATE: 84 BPM | HEIGHT: 62 IN | SYSTOLIC BLOOD PRESSURE: 130 MMHG | DIASTOLIC BLOOD PRESSURE: 68 MMHG | OXYGEN SATURATION: 97 % | BODY MASS INDEX: 21.09 KG/M2 | WEIGHT: 114.6 LBS | TEMPERATURE: 98 F

## 2019-01-21 DIAGNOSIS — R35.0 FREQUENCY OF URINATION: ICD-10-CM

## 2019-01-21 DIAGNOSIS — N30.01 ACUTE CYSTITIS WITH HEMATURIA: Primary | ICD-10-CM

## 2019-01-21 LAB
SL AMB  POCT GLUCOSE, UA: ABNORMAL
SL AMB LEUKOCYTE ESTERASE,UA: ABNORMAL
SL AMB POCT BILIRUBIN,UA: NEGATIVE
SL AMB POCT BLOOD,UA: ABNORMAL
SL AMB POCT CLARITY,UA: ABNORMAL
SL AMB POCT COLOR,UA: ABNORMAL
SL AMB POCT KETONES,UA: NEGATIVE
SL AMB POCT NITRITE,UA: NEGATIVE
SL AMB POCT PH,UA: 7
SL AMB POCT SPECIFIC GRAVITY,UA: 1.02
SL AMB POCT URINE PROTEIN: ABNORMAL
SL AMB POCT UROBILINOGEN: 1

## 2019-01-21 PROCEDURE — 3008F BODY MASS INDEX DOCD: CPT | Performed by: INTERNAL MEDICINE

## 2019-01-21 PROCEDURE — 99213 OFFICE O/P EST LOW 20 MIN: CPT | Performed by: INTERNAL MEDICINE

## 2019-01-21 PROCEDURE — 81003 URINALYSIS AUTO W/O SCOPE: CPT | Performed by: INTERNAL MEDICINE

## 2019-01-21 PROCEDURE — 1036F TOBACCO NON-USER: CPT | Performed by: INTERNAL MEDICINE

## 2019-01-21 RX ORDER — CEPHALEXIN 500 MG/1
500 CAPSULE ORAL EVERY 12 HOURS SCHEDULED
Qty: 14 CAPSULE | Refills: 0 | Status: SHIPPED | OUTPATIENT
Start: 2019-01-21 | End: 2019-01-28

## 2019-01-21 NOTE — PROGRESS NOTES
Assessment/Plan:    Acute cystitis with hematuria  - point of care urine dipstick is positive for large blood and large leukocytes but negative for nitrite  - Will sending her urine for urinalysis and microscopy reflex to culture and sensitivity and adjust antibiotics as needed  - will start patient on Keflex 500 mg twice daily for 7 days  Patient has an allergy to multiple antibiotics including penicillins but states that she has taking cefdinir without any reaction  - she has been counseled to drink lots of water  - she should continue to use Advil with meals for pain as needed  - patient may return to the office if her symptoms do not resolve or if they worsen       Diagnoses and all orders for this visit:    Acute cystitis with hematuria  -     cephalexin (KEFLEX) 500 mg capsule; Take 1 capsule (500 mg total) by mouth every 12 (twelve) hours for 7 days  -     UA w Reflex to Microscopic w Reflex to Culture - Clinic Collect    Frequency of urination  -     POCT urine dip auto non-scope  -     cephalexin (KEFLEX) 500 mg capsule; Take 1 capsule (500 mg total) by mouth every 12 (twelve) hours for 7 days  -     UA w Reflex to Microscopic w Reflex to Culture - Clinic Collect    Other orders  -     ibuprofen (MOTRIN) 100 mg/5 mL suspension; Take 200 mg by mouth every 6 (six) hours as needed for mild pain            Subjective:      Patient ID: Josue Polk is a 66 y o  female  HPI  Patient presents with complaints of dysuria, urinary frequency, urgency, small volume urine, feeling of incomplete emptying for the past 1 week  She also has a associated suprapubic pain and hematuria that started today  She admits to back pain which is chronic and thinks that she might have had right-sided flank pain that started today as well  She denies fever, chills, night sweats, nausea, vomiting, abdominal pain, diarrhea, constipation, myalgias, arthralgias, cough, shortness of breath    She denies any history of kidney stones but states that she does not drink much water at all  The following portions of the patient's history were reviewed and updated as appropriate: allergies, current medications, past family history, past medical history, past social history, past surgical history and problem list     Review of Systems   Constitutional: Negative for activity change, chills, fatigue, fever and unexpected weight change  HENT: Negative for ear pain, postnasal drip, rhinorrhea, sinus pressure and sore throat  Eyes: Negative for pain  Respiratory: Negative for cough, choking, chest tightness, shortness of breath and wheezing  Cardiovascular: Negative for chest pain, palpitations and leg swelling  Gastrointestinal: Negative for abdominal pain, constipation, diarrhea, nausea and vomiting  Genitourinary: Positive for decreased urine volume, dysuria, frequency, hematuria and urgency  Musculoskeletal: Negative for arthralgias, back pain, gait problem, joint swelling, myalgias and neck stiffness  Skin: Negative for pallor and rash  Neurological: Negative for dizziness, tremors, seizures, syncope, light-headedness and headaches  Hematological: Negative for adenopathy  Psychiatric/Behavioral: Negative for behavioral problems           Past Medical History:   Diagnosis Date    Arthritis     Last Assessed:  4/17/15    Herniated disc, cervical     Lipids abnormal     Last Assessed:  8/5/16         Current Outpatient Prescriptions:     escitalopram (LEXAPRO) 5 mg tablet, Take 1 tablet (5 mg total) by mouth daily, Disp: 90 tablet, Rfl: 1    HYDROcodone-acetaminophen (XODOL) 5-300 MG per tablet, daily as needed, Disp: , Rfl:     ibuprofen (MOTRIN) 100 mg/5 mL suspension, Take 200 mg by mouth every 6 (six) hours as needed for mild pain, Disp: , Rfl:     zolpidem (AMBIEN) 5 mg tablet, Take 1 tablet (5 mg total) by mouth daily at bedtime as needed for sleep, Disp: 30 tablet, Rfl: 0    cephalexin (KEFLEX) 500 mg capsule, Take 1 capsule (500 mg total) by mouth every 12 (twelve) hours for 7 days, Disp: 14 capsule, Rfl: 0    Allergies   Allergen Reactions    Ampicillin Other (See Comments)     RASH    Epinephrine     Erythromycin Other (See Comments)    Levofloxacin     Other Other (See Comments)    Penicillins     Procaine      Annotation - [id number]: When used with Epi    Moxifloxacin Rash     Swelling in lymphnodes       Social History   Past Surgical History:   Procedure Laterality Date     SECTION      EPIDURAL BLOCK INJECTION      back    EXPLORATORY LAPAROTOMY      HERNIA REPAIR      Last Assessed:  4/17/15    HYSTERECTOMY      Partial    ROTATOR CUFF REPAIR      Last Assessed:  4/17/15     Family History   Problem Relation Age of Onset    Lupus Mother     Other Mother         Rheumatic disease    Lung cancer Father     Blindness Brother     Heart attack Brother     Stroke Brother        Objective:  /68 (BP Location: Left arm, Patient Position: Sitting, Cuff Size: Adult)   Pulse 84   Temp 98 °F (36 7 °C) (Oral)   Ht 5' 1 5" (1 562 m)   Wt 52 kg (114 lb 9 6 oz)   SpO2 97% Comment: room air  BMI 21 30 kg/m²        Physical Exam   Constitutional: She is oriented to person, place, and time  She appears well-developed and well-nourished  No distress  HENT:   Head: Normocephalic and atraumatic  Right Ear: External ear normal    Left Ear: External ear normal    Nose: Nose normal    Mouth/Throat: Oropharynx is clear and moist  No oropharyngeal exudate  Eyes: Pupils are equal, round, and reactive to light  Conjunctivae and EOM are normal  Right eye exhibits no discharge  Left eye exhibits no discharge  No scleral icterus  Neck: Normal range of motion  Neck supple  No JVD present  No tracheal deviation present  No thyromegaly present  Cardiovascular: Normal rate, regular rhythm, normal heart sounds and intact distal pulses  Exam reveals no gallop and no friction rub      No murmur heard  Pulmonary/Chest: Effort normal and breath sounds normal  No respiratory distress  She has no wheezes  She has no rales  She exhibits no tenderness  Abdominal: Soft  Bowel sounds are normal  She exhibits no distension and no mass  There is tenderness (Suprapubic tenderness)  There is no rebound and no guarding  No CVA tenderness elicited   Musculoskeletal: Normal range of motion  She exhibits no edema, tenderness or deformity  Lymphadenopathy:     She has no cervical adenopathy  Neurological: She is alert and oriented to person, place, and time  She has normal reflexes  No cranial nerve deficit  She exhibits normal muscle tone  Coordination normal    Skin: Skin is warm and dry  No rash noted  She is not diaphoretic  No erythema  No pallor  Psychiatric: She has a normal mood and affect   Her behavior is normal

## 2019-01-21 NOTE — PATIENT INSTRUCTIONS
Urinary Tract Infection in Women   AMBULATORY CARE:   A urinary tract infection (UTI)  is caused by bacteria that get inside your urinary tract  Most bacteria that enter your urinary tract come out when you urinate  If the bacteria stay in your urinary tract, you may get an infection  Your urinary tract includes your kidneys, ureters, bladder, and urethra  Urine is made in your kidneys, and it flows from the ureters to the bladder  Urine leaves the bladder through the urethra  A UTI is more common in your lower urinary tract, which includes your bladder and urethra  Common symptoms include the following:   · Urinating more often or waking from sleep to urinate    · Pain or burning when you urinate    · Pain or pressure in your lower abdomen     · Urine that smells bad    · Blood in your urine    · Leaking urine  Seek care immediately if:   · You are urinating very little or not at all  · You have a high fever with shaking chills  · You have side or back pain that gets worse  Contact your healthcare provider if:   · You have a fever  · You do not feel better after 2 days of taking antibiotics  · You are vomiting  · You have questions or concerns about your condition or care  Treatment for a UTI  may include medicines to treat a bacterial infection  You may also need medicines to decrease pain and burning, or decrease the urge to urinate often  Prevent a UTI:   · Empty your bladder often  Urinate and empty your bladder as soon as you feel the need  Do not hold your urine for long periods of time  · Wipe from front to back after you urinate or have a bowel movement  This will help prevent germs from getting into your urinary tract through your urethra  · Drink liquids as directed  Ask how much liquid to drink each day and which liquids are best for you  You may need to drink more liquids than usual to help flush out the bacteria  Do not drink alcohol, caffeine, or citrus juices  These can irritate your bladder and increase your symptoms  Your healthcare provider may recommend cranberry juice to help prevent a UTI  · Urinate after you have sex  This can help flush out bacteria passed during sex  · Do not douche or use feminine deodorants  These can change the chemical balance in your vagina  · Change sanitary pads or tampons often  This will help prevent germs from getting into your urinary tract  · Do pelvic muscle exercises often  Pelvic muscle exercises may help you start and stop urinating  Strong pelvic muscles may help you empty your bladder easier  Squeeze these muscles tightly for 5 seconds like you are trying to hold back urine  Then relax for 5 seconds  Gradually work up to squeezing for 10 seconds  Do 3 sets of 15 repetitions a day, or as directed  Follow up with your healthcare provider as directed:  Write down your questions so you remember to ask them during your visits  © 2017 [address] [employer] Information is for End User's use only and may not be sold, redistributed or otherwise used for commercial purposes  All illustrations and images included in CareNotes® are the copyrighted property of [employer]  or [employer]alio Weir  The above information is an  only  It is not intended as medical advice for individual conditions or treatments  Talk to your doctor, nurse or pharmacist before following any medical regimen to see if it is safe and effective for you

## 2019-02-01 ENCOUNTER — TELEPHONE (OUTPATIENT)
Dept: INTERNAL MEDICINE CLINIC | Facility: CLINIC | Age: 59
End: 2019-02-01

## 2019-02-01 NOTE — TELEPHONE ENCOUNTER
Patient called very irate stating that someone was supposed to call her in regards to her results from her urinalysis  If someone could please give the patient a call back ASAP! Thank you! [phone]

## 2019-02-01 NOTE — TELEPHONE ENCOUNTER
Contacted patient regarding UTI treated with Keflex as well as lost urine sample which was to be sent to the lab for urinalysis with microscopy and culture  She was understanding  Reports that her symptoms have resolved  I also expressed my sincere apology and will look into this situation such that this does not occur again

## 2019-06-07 DIAGNOSIS — F41.9 ANXIETY: ICD-10-CM

## 2019-06-07 RX ORDER — ESCITALOPRAM OXALATE 5 MG/1
5 TABLET ORAL DAILY
Qty: 30 TABLET | Refills: 0 | Status: SHIPPED | OUTPATIENT
Start: 2019-06-07 | End: 2019-06-17 | Stop reason: DRUGHIGH

## 2019-06-07 RX ORDER — ESCITALOPRAM OXALATE 5 MG/1
TABLET ORAL
Qty: 90 TABLET | Refills: 1 | OUTPATIENT
Start: 2019-06-07

## 2019-06-17 ENCOUNTER — OFFICE VISIT (OUTPATIENT)
Dept: INTERNAL MEDICINE CLINIC | Facility: CLINIC | Age: 59
End: 2019-06-17
Payer: COMMERCIAL

## 2019-06-17 VITALS
HEART RATE: 72 BPM | BODY MASS INDEX: 21.09 KG/M2 | WEIGHT: 114.6 LBS | OXYGEN SATURATION: 98 % | HEIGHT: 62 IN | SYSTOLIC BLOOD PRESSURE: 126 MMHG | TEMPERATURE: 98.2 F | DIASTOLIC BLOOD PRESSURE: 84 MMHG

## 2019-06-17 DIAGNOSIS — E78.89 LIPIDS ABNORMAL: Primary | ICD-10-CM

## 2019-06-17 DIAGNOSIS — F41.9 ANXIETY: ICD-10-CM

## 2019-06-17 PROBLEM — N30.01 ACUTE CYSTITIS WITH HEMATURIA: Status: RESOLVED | Noted: 2019-01-21 | Resolved: 2019-06-17

## 2019-06-17 PROBLEM — Z91.89 ENCOUNTER FOR HEPATITIS C VIRUS SCREENING TEST FOR HIGH RISK PATIENT: Status: RESOLVED | Noted: 2018-11-29 | Resolved: 2019-06-17

## 2019-06-17 PROBLEM — Z11.59 ENCOUNTER FOR HEPATITIS C VIRUS SCREENING TEST FOR HIGH RISK PATIENT: Status: RESOLVED | Noted: 2018-11-29 | Resolved: 2019-06-17

## 2019-06-17 PROCEDURE — 99214 OFFICE O/P EST MOD 30 MIN: CPT | Performed by: NURSE PRACTITIONER

## 2019-06-17 PROCEDURE — 3008F BODY MASS INDEX DOCD: CPT | Performed by: NURSE PRACTITIONER

## 2019-06-17 PROCEDURE — 1036F TOBACCO NON-USER: CPT | Performed by: NURSE PRACTITIONER

## 2019-06-17 RX ORDER — ESCITALOPRAM OXALATE 5 MG/1
5 TABLET ORAL DAILY
Qty: 90 TABLET | Refills: 1 | Status: CANCELLED | OUTPATIENT
Start: 2019-06-17

## 2019-06-17 RX ORDER — CITALOPRAM 10 MG/1
10 TABLET ORAL DAILY
Qty: 90 TABLET | Refills: 0 | Status: SHIPPED | OUTPATIENT
Start: 2019-06-17 | End: 2019-07-09 | Stop reason: SDUPTHER

## 2019-07-05 DIAGNOSIS — F41.9 ANXIETY: ICD-10-CM

## 2019-07-05 RX ORDER — ESCITALOPRAM OXALATE 5 MG/1
TABLET ORAL
Qty: 30 TABLET | Refills: 0 | OUTPATIENT
Start: 2019-07-05

## 2019-07-09 DIAGNOSIS — F41.9 ANXIETY: ICD-10-CM

## 2019-07-09 NOTE — TELEPHONE ENCOUNTER
MED: Celexa 10mg  SUPPLY: 90Day  PHARMACY:[facility]  PATIENT PHONE #: [phone]  LAST OV: 6/17/2019  UPCOMING OV: 12/16/2019

## 2019-07-10 RX ORDER — CITALOPRAM 10 MG/1
10 TABLET ORAL DAILY
Qty: 90 TABLET | Refills: 1 | Status: SHIPPED | OUTPATIENT
Start: 2019-07-10 | End: 2019-07-15 | Stop reason: SDUPTHER

## 2019-07-15 ENCOUNTER — TELEPHONE (OUTPATIENT)
Dept: INTERNAL MEDICINE CLINIC | Facility: CLINIC | Age: 59
End: 2019-07-15

## 2019-07-15 DIAGNOSIS — F41.9 ANXIETY: ICD-10-CM

## 2019-07-15 RX ORDER — CITALOPRAM 20 MG/1
20 TABLET ORAL DAILY
Qty: 90 TABLET | Refills: 0 | Status: SHIPPED | OUTPATIENT
Start: 2019-07-15 | End: 2019-07-30

## 2019-07-15 NOTE — TELEPHONE ENCOUNTER
Patient called and stated that she would like to speak W/ Gary in regards to the dosage for her prescription for Celexa  She stated that she called last week and spoke to someone in regards to getting the dosage changed but she never received a call  If someone could please look into this  The patients phone number is [phone]  Thank you!

## 2019-07-15 NOTE — TELEPHONE ENCOUNTER
Spoke with patient, she did well on the Celexa 10 mg, was able to increase to 20 mg and is asking for refill at the 20 mg dosing  No negative affects on the increased dosing  She has not felt any benefit to 20 mg dosing it, however she acknowledges that has not been enough time for that benefit to yet be felt

## 2019-07-25 ENCOUNTER — TELEPHONE (OUTPATIENT)
Dept: OBGYN CLINIC | Facility: CLINIC | Age: 59
End: 2019-07-25

## 2019-07-25 NOTE — TELEPHONE ENCOUNTER
Patient saw Dr Sharon Gustafson for her yearly and was given samples of estrace cream  Patient states it worked well for her and would like a script for it now  Please advise

## 2019-07-30 ENCOUNTER — TELEPHONE (OUTPATIENT)
Dept: INTERNAL MEDICINE CLINIC | Facility: CLINIC | Age: 59
End: 2019-07-30

## 2019-07-30 DIAGNOSIS — F41.9 ANXIETY: ICD-10-CM

## 2019-07-30 RX ORDER — CITALOPRAM 10 MG/1
10 TABLET ORAL DAILY
Qty: 30 TABLET | Refills: 0
Start: 2019-07-30 | End: 2019-08-13 | Stop reason: SDUPTHER

## 2019-07-30 NOTE — TELEPHONE ENCOUNTER
Patient had called asking to speak to you in regards to a medication that was recently changed  She would like to switch back to previous one   Please call her when you can [phone]

## 2019-07-30 NOTE — TELEPHONE ENCOUNTER
Spoke with patient, she wants to decrease her dose of celexa back down to 10mg due to increased sweating  I agree with this plan, will change dosing in chart

## 2019-08-13 DIAGNOSIS — F41.9 ANXIETY: ICD-10-CM

## 2019-08-13 RX ORDER — CITALOPRAM 10 MG/1
10 TABLET ORAL DAILY
Qty: 90 TABLET | Refills: 0 | Status: SHIPPED | OUTPATIENT
Start: 2019-08-13 | End: 2019-12-16

## 2019-08-13 NOTE — TELEPHONE ENCOUNTER
Last O/V: 6/17/19  Next O/V: 12/16/19    Needs correct 10 MG sent to her pharmacy   They gave her 20 MG

## 2019-08-28 DIAGNOSIS — N95.2 ATROPHIC VAGINITIS: Primary | ICD-10-CM

## 2019-08-28 RX ORDER — ESTRADIOL 0.1 MG/G
CREAM VAGINAL
Qty: 42.5 G | Refills: 5 | Status: SHIPPED | OUTPATIENT
Start: 2019-08-28 | End: 2019-12-16 | Stop reason: ALTCHOICE

## 2019-12-16 ENCOUNTER — OFFICE VISIT (OUTPATIENT)
Dept: INTERNAL MEDICINE CLINIC | Facility: CLINIC | Age: 59
End: 2019-12-16
Payer: COMMERCIAL

## 2019-12-16 VITALS
DIASTOLIC BLOOD PRESSURE: 80 MMHG | TEMPERATURE: 98.6 F | WEIGHT: 113 LBS | HEIGHT: 62 IN | BODY MASS INDEX: 20.8 KG/M2 | HEART RATE: 89 BPM | SYSTOLIC BLOOD PRESSURE: 132 MMHG | OXYGEN SATURATION: 99 %

## 2019-12-16 DIAGNOSIS — M54.12 CERVICAL RADICULOPATHY: ICD-10-CM

## 2019-12-16 DIAGNOSIS — E78.89 LIPIDS ABNORMAL: ICD-10-CM

## 2019-12-16 DIAGNOSIS — F41.9 ANXIETY: Primary | ICD-10-CM

## 2019-12-16 DIAGNOSIS — G47.09 OTHER INSOMNIA: ICD-10-CM

## 2019-12-16 PROBLEM — N89.8 VAGINAL DRYNESS: Status: RESOLVED | Noted: 2017-05-08 | Resolved: 2019-12-16

## 2019-12-16 PROCEDURE — 99214 OFFICE O/P EST MOD 30 MIN: CPT | Performed by: NURSE PRACTITIONER

## 2019-12-16 PROCEDURE — 1036F TOBACCO NON-USER: CPT | Performed by: NURSE PRACTITIONER

## 2019-12-16 PROCEDURE — 3008F BODY MASS INDEX DOCD: CPT | Performed by: NURSE PRACTITIONER

## 2019-12-16 RX ORDER — CITALOPRAM 10 MG/1
5 TABLET ORAL DAILY
Start: 2019-12-16 | End: 2020-07-29 | Stop reason: SDUPTHER

## 2019-12-16 RX ORDER — ZOLPIDEM TARTRATE 5 MG/1
5 TABLET ORAL
Qty: 30 TABLET | Refills: 0 | Status: SHIPPED | OUTPATIENT
Start: 2019-12-16 | End: 2020-06-16 | Stop reason: SDUPTHER

## 2019-12-16 NOTE — PROGRESS NOTES
Assessment/Plan:       Problem List Items Addressed This Visit        Nervous and Auditory    Cervical radiculopathy     Followed closely by pain management, Dr Barrow Roann  Other    Other insomnia     Takes Ambien very rarely  The PDMP was queried and no signs of abuse or misuse were noted  Refilled today for 30 tabs  Relevant Medications    zolpidem (AMBIEN) 5 mg tablet    Lipids abnormal     ASCVD 10 year risk assessment score 1 9%, not in statin benefit category  Discussed lifestyle modifications, diet, exercise  Anxiety - Primary     On Celexa 5mg, stable, no med changes         Relevant Medications    citalopram (CeleXA) 10 mg tablet                 Subjective:      Patient ID: Mignon Farnsworth is a 64 y o  female  Patient presents for a 6 month follow up on chronic conditions  Last labs 11/15/19-- CBC and CMP- WNL  TSH 0 78  Lipid Panel: Total cholesterol 245; Trig 75, ,     She is still taking [facility]arrow [facility] at [facility] for her pain management for her back and neck pain  She takes Norco twice daily or less  and is getting injections  With regards to her insomnia, it is improving  She takes the [place] less than a couple times per month if she goes through a period of worsening insomnia over 2-3 days at a time, then she will have periods of it improving for weeks at a time  She feels really good with regards to her sleep  She is doing well on 5mg Celexa  She still gets some sweats on it, but does not want to change dosing  The following portions of the patient's history were reviewed and updated as appropriate: allergies, current medications, past family history, past medical history, past social history, past surgical history and problem list     Review of Systems   Constitutional: Negative for chills, fatigue and fever  HENT: Negative for trouble swallowing  Respiratory: Negative for cough, shortness of breath and wheezing      Cardiovascular: Negative for chest pain, palpitations and leg swelling  Gastrointestinal: Positive for diarrhea (intermittently, does not feel its related to food or stress)  Negative for abdominal pain, constipation, nausea and vomiting  Musculoskeletal: Negative for gait problem  Skin: Negative for rash  Neurological: Negative for dizziness and headaches  Psychiatric/Behavioral: Positive for sleep disturbance (per HPI)  The patient is not nervous/anxious            Past Medical History:   Diagnosis Date    Anxiety     Arthritis     Last Assessed:  4/17/15    Herniated disc, cervical     Lipids abnormal     Last Assessed:  16         Current Outpatient Medications:     citalopram (CeleXA) 10 mg tablet, Take 0 5 tablets (5 mg total) by mouth daily, Disp: , Rfl:     HYDROcodone-acetaminophen (NORCO) 5-325 mg per tablet, 1 tablet daily as needed , Disp: , Rfl:     ibuprofen (MOTRIN) 100 mg/5 mL suspension, Take 200 mg by mouth every 6 (six) hours as needed for mild pain, Disp: , Rfl:     loratadine (CLARITIN) 10 mg tablet, Take 10 mg by mouth daily, Disp: , Rfl:     zolpidem (AMBIEN) 5 mg tablet, Take 1 tablet (5 mg total) by mouth daily at bedtime as needed for sleep, Disp: 30 tablet, Rfl: 0    Allergies   Allergen Reactions    Ampicillin Other (See Comments)     RASH    Epinephrine     Erythromycin Other (See Comments)    Iv Dye [Iodinated Diagnostic Agents]     Levofloxacin     Other Other (See Comments)    Penicillins     Procaine      Annotation - [id number]: When used with Epi    Moxifloxacin Rash     Swelling in lymphnodes       Social History   Past Surgical History:   Procedure Laterality Date     SECTION      EPIDURAL BLOCK INJECTION      back    EXPLORATORY LAPAROTOMY      HERNIA REPAIR      Last Assessed:  4/17/15    HYSTERECTOMY      Partial    ROTATOR CUFF REPAIR      Last Assessed:  4/17/15     Family History   Problem Relation Age of Onset    Lupus Mother    [facility] Other Mother         Rheumatic disease    Lung cancer Father     Blindness Brother     Heart attack Brother     Stroke Brother        Objective:  /80 (BP Location: Left arm, Patient Position: Sitting, Cuff Size: Adult)   Pulse 89   Temp 98 6 °F (37 °C) (Oral)   Ht 5' 1 5" (1 562 m)   Wt 51 3 kg (113 lb)   SpO2 99%   BMI 21 01 kg/m²        Physical Exam   Constitutional: She is oriented to person, place, and time  She appears well-developed and well-nourished  No distress  HENT:   Head: Normocephalic and atraumatic  Right Ear: External ear normal    Left Ear: External ear normal    Mouth/Throat: No oropharyngeal exudate  Eyes: Pupils are equal, round, and reactive to light  No scleral icterus  Neck: Normal range of motion  Neck supple  Cardiovascular: Normal rate and regular rhythm  Pulmonary/Chest: Effort normal and breath sounds normal    Abdominal: Soft  Bowel sounds are normal    Musculoskeletal: Normal range of motion  She exhibits no edema  Lymphadenopathy:     She has no cervical adenopathy  Neurological: She is alert and oriented to person, place, and time  Skin: Skin is warm and dry  Psychiatric: She has a normal mood and affect   Her behavior is normal

## 2019-12-16 NOTE — ASSESSMENT & PLAN NOTE
ASCVD 10 year risk assessment score 1 9%, not in statin benefit category  Discussed lifestyle modifications, diet, exercise

## 2019-12-16 NOTE — ASSESSMENT & PLAN NOTE
Takes Ambien very rarely  The PDMP was queried and no signs of abuse or misuse were noted  Refilled today for 30 tabs

## 2020-04-01 ENCOUNTER — TELEPHONE (OUTPATIENT)
Dept: INTERNAL MEDICINE CLINIC | Facility: CLINIC | Age: 60
End: 2020-04-01

## 2020-05-04 ENCOUNTER — TELEPHONE (OUTPATIENT)
Dept: INTERNAL MEDICINE CLINIC | Facility: CLINIC | Age: 60
End: 2020-05-04

## 2020-06-16 ENCOUNTER — OFFICE VISIT (OUTPATIENT)
Dept: INTERNAL MEDICINE CLINIC | Facility: CLINIC | Age: 60
End: 2020-06-16
Payer: COMMERCIAL

## 2020-06-16 VITALS
DIASTOLIC BLOOD PRESSURE: 88 MMHG | WEIGHT: 111.6 LBS | BODY MASS INDEX: 20.54 KG/M2 | HEART RATE: 82 BPM | HEIGHT: 62 IN | SYSTOLIC BLOOD PRESSURE: 132 MMHG | TEMPERATURE: 98.9 F | OXYGEN SATURATION: 98 %

## 2020-06-16 DIAGNOSIS — E78.89 LIPIDS ABNORMAL: ICD-10-CM

## 2020-06-16 DIAGNOSIS — F41.9 ANXIETY: ICD-10-CM

## 2020-06-16 DIAGNOSIS — M54.12 CERVICAL RADICULOPATHY: ICD-10-CM

## 2020-06-16 DIAGNOSIS — G47.09 OTHER INSOMNIA: Primary | ICD-10-CM

## 2020-06-16 PROCEDURE — 3008F BODY MASS INDEX DOCD: CPT | Performed by: NURSE PRACTITIONER

## 2020-06-16 PROCEDURE — 1036F TOBACCO NON-USER: CPT | Performed by: NURSE PRACTITIONER

## 2020-06-16 PROCEDURE — 99214 OFFICE O/P EST MOD 30 MIN: CPT | Performed by: NURSE PRACTITIONER

## 2020-06-16 RX ORDER — DIAZEPAM 5 MG/1
TABLET ORAL
COMMUNITY
Start: 2020-06-12 | End: 2020-09-30

## 2020-06-16 RX ORDER — ZOLPIDEM TARTRATE 5 MG/1
5 TABLET ORAL
Qty: 30 TABLET | Refills: 0 | Status: SHIPPED | OUTPATIENT
Start: 2020-06-16 | End: 2020-09-30 | Stop reason: SDUPTHER

## 2020-06-24 DIAGNOSIS — F41.9 ANXIETY: ICD-10-CM

## 2020-06-26 RX ORDER — CITALOPRAM 10 MG/1
TABLET ORAL
Qty: 30 TABLET | Refills: 2 | OUTPATIENT
Start: 2020-06-26

## 2020-07-02 ENCOUNTER — TELEPHONE (OUTPATIENT)
Dept: INTERNAL MEDICINE CLINIC | Facility: CLINIC | Age: 60
End: 2020-07-02

## 2020-07-02 NOTE — TELEPHONE ENCOUNTER
Patient called and was informed of the results  Advised that she limit fats and cholesterol in her diet and get regular exercise

## 2020-07-02 NOTE — TELEPHONE ENCOUNTER
Patient would like a call with her result from her labs collected on 7/1/2020  Please call [phone]

## 2020-07-08 ENCOUNTER — TELEPHONE (OUTPATIENT)
Dept: INTERNAL MEDICINE CLINIC | Age: 60
End: 2020-07-08

## 2020-07-08 DIAGNOSIS — E78.2 MIXED HYPERLIPIDEMIA: Primary | ICD-10-CM

## 2020-07-08 RX ORDER — SIMVASTATIN 10 MG
10 TABLET ORAL
Qty: 30 TABLET | Refills: 1 | Status: SHIPPED | OUTPATIENT
Start: 2020-07-08 | End: 2021-03-17 | Stop reason: ALTCHOICE

## 2020-07-08 NOTE — TELEPHONE ENCOUNTER
Noted below  Sent simvastatin 10mg to pharmacy  Pt to have repeat labs as noted below    Gary can order when she is back in office so she can follow results

## 2020-07-08 NOTE — TELEPHONE ENCOUNTER
Pt called and stated that she would be amenable to starting a statin but would like to start on the lowest possible dose due to concern about side effects

## 2020-07-08 NOTE — TELEPHONE ENCOUNTER
----- Message from [address] sent at 7/7/2020  7:06 PM EDT -----  Attempted to call patient, unable to leave message  ASCVD 10 year risk score is >9, will need a statin d/t lab values  They are worsening  If she is worried about starting a statin, can change lifestyle for 4 months and recheck, otherwise would consider  Starting  simvastatin 20mg, check CMP, CK, Lipid panel in 4 months  Please discuss with patient

## 2020-07-08 NOTE — TELEPHONE ENCOUNTER
Pt called and notified of prescription   Pt advised to take medication at night, avoid grapefruit, and to call if any adverse effects occur

## 2020-07-16 ENCOUNTER — TELEPHONE (OUTPATIENT)
Dept: INTERNAL MEDICINE CLINIC | Facility: CLINIC | Age: 60
End: 2020-07-16

## 2020-07-16 DIAGNOSIS — Z23 NEED FOR SHINGLES VACCINE: Primary | ICD-10-CM

## 2020-07-16 NOTE — TELEPHONE ENCOUNTER
[facility] [place] calling to ask for a script for the Shingrix vaccine be faxed over to [facility] pharmacy  She said they would only charge her 20 00 out of pocket with her insurance  This was discussed with Gary in her 6/16/20 office visit  Would like to be notified when sent over

## 2020-07-27 DIAGNOSIS — E78.2 MIXED HYPERLIPIDEMIA: Primary | ICD-10-CM

## 2020-07-27 NOTE — PROGRESS NOTES
Patient's labs to be completed in 4 months ordered now, s/p starting statin  Please mail to patient's home and remind her to have them completed in about 4 months   Thank you

## 2020-07-29 DIAGNOSIS — F41.9 ANXIETY: ICD-10-CM

## 2020-07-29 RX ORDER — CITALOPRAM 10 MG/1
5 TABLET ORAL DAILY
Qty: 90 TABLET | Refills: 0 | Status: SHIPPED | OUTPATIENT
Start: 2020-07-29 | End: 2020-08-19 | Stop reason: SDUPTHER

## 2020-08-19 ENCOUNTER — TELEPHONE (OUTPATIENT)
Dept: INTERNAL MEDICINE CLINIC | Facility: CLINIC | Age: 60
End: 2020-08-19

## 2020-08-19 DIAGNOSIS — F41.9 ANXIETY: ICD-10-CM

## 2020-08-19 RX ORDER — CITALOPRAM 10 MG/1
10 TABLET ORAL DAILY
Qty: 90 TABLET | Refills: 0 | Status: SHIPPED | OUTPATIENT
Start: 2020-08-19 | End: 2020-09-14 | Stop reason: SDUPTHER

## 2020-08-19 NOTE — TELEPHONE ENCOUNTER
Reviewed notes it appears patient is on 5mg according to [employer] note  But patient has been well controlled as per last note from Gary will refill for 10mg  Will forward to Gary as FYI

## 2020-08-19 NOTE — TELEPHONE ENCOUNTER
I called the patient and she stated that there has been a misunderstanding in her Citalopram dose  She has always taken a whole tablet  Her previous entries indicated that she was receiving 10 mg tablets and taking a whole tablet  During her visit, she mistakenly told Don Orozco that she had 5 mg tablets  The patient states that there ws no discussion about decreasing her dose and she would like the order changed and refilled at 10 mg daily  She uses [facility] in [place]

## 2020-08-25 ENCOUNTER — TELEPHONE (OUTPATIENT)
Dept: OBGYN CLINIC | Facility: CLINIC | Age: 60
End: 2020-08-25

## 2020-08-25 DIAGNOSIS — N64.89 BREAST ASYMMETRY: Primary | ICD-10-CM

## 2020-08-25 NOTE — TELEPHONE ENCOUNTER
Pt had some dense tissue on last mammo and needs another one code 03305 3D CAD and then a diagnostic U/S Code: 82608    Please upload to chart    Please advise with any questions: [phone]

## 2020-09-02 DIAGNOSIS — Z23 NEED FOR SHINGLES VACCINE: Primary | ICD-10-CM

## 2020-09-02 RX ORDER — ZOSTER VACCINE RECOMBINANT, ADJUVANTED 50 MCG/0.5
0.5 KIT INTRAMUSCULAR ONCE
Qty: 1 EACH | Refills: 1 | Status: CANCELLED | OUTPATIENT
Start: 2020-09-02 | End: 2020-09-02

## 2020-09-02 RX ORDER — ZOSTER VACCINE RECOMBINANT, ADJUVANTED 50 MCG/0.5
0.5 KIT INTRAMUSCULAR ONCE
Qty: 1 EACH | Refills: 1 | Status: SHIPPED | OUTPATIENT
Start: 2020-09-02 | End: 2020-09-02

## 2020-09-10 ENCOUNTER — TELEPHONE (OUTPATIENT)
Dept: INTERNAL MEDICINE CLINIC | Facility: CLINIC | Age: 60
End: 2020-09-10

## 2020-09-10 NOTE — TELEPHONE ENCOUNTER
Patient needs Celexa 10mgs #30 with refills to be sent to [facility] [employer]  Fortino Pablo had sent a script over on 08/19/2020  Patients insurance only covers a #30 day, not a #90 day and [facility] told her that she needs a new script sent

## 2020-09-14 DIAGNOSIS — F41.9 ANXIETY: ICD-10-CM

## 2020-09-15 RX ORDER — CITALOPRAM 10 MG/1
10 TABLET ORAL DAILY
Qty: 30 TABLET | Refills: 4 | Status: SHIPPED | OUTPATIENT
Start: 2020-09-15 | End: 2020-09-30 | Stop reason: SDUPTHER

## 2020-09-29 ENCOUNTER — HOSPITAL ENCOUNTER (OUTPATIENT)
Dept: MAMMOGRAPHY | Facility: CLINIC | Age: 60
Discharge: HOME | End: 2020-09-29
Payer: COMMERCIAL

## 2020-09-29 ENCOUNTER — TRANSCRIBE ORDERS (OUTPATIENT)
Dept: ADMINISTRATIVE | Facility: HOSPITAL | Age: 60
End: 2020-09-29

## 2020-09-29 VITALS — BODY MASS INDEX: 20.77 KG/M2 | WEIGHT: 110 LBS | HEIGHT: 61 IN

## 2020-09-29 DIAGNOSIS — Z09 FOLLOW-UP EXAM, 3-6 MONTHS SINCE PREVIOUS EXAM: Primary | ICD-10-CM

## 2020-09-29 DIAGNOSIS — N64.89 BREAST ASYMMETRY: ICD-10-CM

## 2020-09-29 PROCEDURE — 77065 DX MAMMO INCL CAD UNI: CPT

## 2020-09-29 PROCEDURE — 76642 ULTRASOUND BREAST LIMITED: CPT

## 2020-09-30 ENCOUNTER — OFFICE VISIT (OUTPATIENT)
Dept: INTERNAL MEDICINE CLINIC | Facility: CLINIC | Age: 60
End: 2020-09-30
Payer: COMMERCIAL

## 2020-09-30 VITALS
WEIGHT: 111.8 LBS | HEART RATE: 88 BPM | SYSTOLIC BLOOD PRESSURE: 140 MMHG | OXYGEN SATURATION: 99 % | DIASTOLIC BLOOD PRESSURE: 88 MMHG | HEIGHT: 61 IN | BODY MASS INDEX: 21.11 KG/M2 | TEMPERATURE: 97.6 F

## 2020-09-30 DIAGNOSIS — G47.09 OTHER INSOMNIA: ICD-10-CM

## 2020-09-30 DIAGNOSIS — F41.9 ANXIETY: ICD-10-CM

## 2020-09-30 PROCEDURE — 99213 OFFICE O/P EST LOW 20 MIN: CPT | Performed by: INTERNAL MEDICINE

## 2020-09-30 PROCEDURE — 3725F SCREEN DEPRESSION PERFORMED: CPT | Performed by: INTERNAL MEDICINE

## 2020-09-30 PROCEDURE — 1036F TOBACCO NON-USER: CPT | Performed by: INTERNAL MEDICINE

## 2020-09-30 RX ORDER — ALPRAZOLAM 0.25 MG/1
0.12 TABLET ORAL 2 TIMES DAILY PRN
Qty: 30 TABLET | Refills: 0 | Status: SHIPPED | OUTPATIENT
Start: 2020-09-30 | End: 2021-03-17 | Stop reason: SDUPTHER

## 2020-09-30 RX ORDER — ZOLPIDEM TARTRATE 5 MG/1
5 TABLET ORAL
Qty: 30 TABLET | Refills: 0 | Status: SHIPPED | OUTPATIENT
Start: 2020-09-30 | End: 2020-10-29

## 2020-09-30 RX ORDER — ALPRAZOLAM 0.25 MG/1
0.25 TABLET ORAL
COMMUNITY
End: 2020-09-30 | Stop reason: SDUPTHER

## 2020-09-30 RX ORDER — CITALOPRAM 20 MG/1
20 TABLET ORAL DAILY
Qty: 30 TABLET | Refills: 5 | Status: SHIPPED | OUTPATIENT
Start: 2020-09-30 | End: 2020-12-23 | Stop reason: SDUPTHER

## 2020-09-30 NOTE — ASSESSMENT & PLAN NOTE
Continue Ambien 5 mg at bedtime as needed for insomnia    Instructed to not take Ambien with alprazolam

## 2020-09-30 NOTE — PROGRESS NOTES
Assessment/Plan:    Anxiety  Uncontrolled  Will increase Celexa from 10 mg daily to 20 mg daily  Continue alprazolam as 0 125 mg twice a day as needed for increased agitation/panic  Instructed for her to not take Xanax with Ambien  Other insomnia  Continue Ambien 5 mg at bedtime as needed for insomnia  Instructed to not take Ambien with alprazolam        Diagnoses and all orders for this visit:    Anxiety  -     ALPRAZolam (XANAX) 0 25 mg tablet; Take 0 5 tablets (0 125 mg total) by mouth 2 (two) times a day as needed for anxiety  -     citalopram (CeleXA) 20 mg tablet; Take 1 tablet (20 mg total) by mouth daily    Other insomnia  -     zolpidem (AMBIEN) 5 mg tablet; Take 1 tablet (5 mg total) by mouth daily at bedtime as needed for sleep    Other orders  -     Discontinue: ALPRAZolam (XANAX) 0 25 mg tablet; Take 0 25 mg by mouth daily at bedtime as needed for anxiety            Depression Screening and Follow-up Plan: Clincally patient does not have depression  No treatment is required  Subjective:      Patient ID: Elvis Sim is a 64 y o  female  Chief Complaint   Patient presents with    Anxiety     increased anxiety       53-year-old female is seen today with concern for worsening anxiety  She has been experiencing increased anxiety due to work stress  She denies any panic  She has been compliant with her medication regimen  She is taking Ambien for insomnia and has not been getting a full night's, only sleeping approximately 4 hours  She has been taking 0 125 mg BID for increased anxiety  Anxiety   Presents for follow-up visit  Symptoms include excessive worry, insomnia and nervous/anxious behavior  Patient reports no chest pain, dizziness, nausea, palpitations, shortness of breath or suicidal ideas  Symptoms occur constantly  The severity of symptoms is moderate and causing significant distress  The patient sleeps 4 hours per night  The quality of sleep is non-restorative  Compliance with medications is %  The following portions of the patient's history were reviewed and updated as appropriate: allergies, current medications, past family history, past medical history, past social history, past surgical history and problem list     Review of Systems   Constitutional: Negative for activity change, appetite change, chills, diaphoresis, fatigue and fever  HENT: Negative for congestion, postnasal drip, rhinorrhea, sinus pressure, sinus pain, sneezing and sore throat  Eyes: Negative for visual disturbance  Respiratory: Negative for apnea, cough, choking, chest tightness, shortness of breath and wheezing  Cardiovascular: Negative for chest pain, palpitations and leg swelling  Gastrointestinal: Negative for abdominal distention, abdominal pain, anal bleeding, blood in stool, constipation, diarrhea, nausea and vomiting  Endocrine: Negative for cold intolerance and heat intolerance  Genitourinary: Negative for difficulty urinating, dysuria and hematuria  Musculoskeletal: Negative  Skin: Negative  Neurological: Negative for dizziness, weakness, light-headedness, numbness and headaches  Hematological: Negative for adenopathy  Psychiatric/Behavioral: Negative for agitation, sleep disturbance and suicidal ideas  The patient is nervous/anxious and has insomnia  All other systems reviewed and are negative          Past Medical History:   Diagnosis Date    Anxiety     Arthritis     Last Assessed:  4/17/15    Herniated disc, cervical     Lipids abnormal     Last Assessed:  8/5/16         Current Outpatient Medications:     ALPRAZolam (XANAX) 0 25 mg tablet, Take 0 5 tablets (0 125 mg total) by mouth 2 (two) times a day as needed for anxiety, Disp: 30 tablet, Rfl: 0    citalopram (CeleXA) 20 mg tablet, Take 1 tablet (20 mg total) by mouth daily, Disp: 30 tablet, Rfl: 5    fluticasone (FLONASE) 50 mcg/act nasal spray, 2 sprays into each nostril daily, Disp: 1 Bottle, Rfl: 11    ibuprofen (MOTRIN) 100 mg/5 mL suspension, Take 200 mg by mouth every 6 (six) hours as needed for mild pain, Disp: , Rfl:     simvastatin (ZOCOR) 10 mg tablet, Take 1 tablet (10 mg total) by mouth daily at bedtime, Disp: 30 tablet, Rfl: 1    zolpidem (AMBIEN) 5 mg tablet, Take 1 tablet (5 mg total) by mouth daily at bedtime as needed for sleep, Disp: 30 tablet, Rfl: 0    HYDROcodone-acetaminophen (NORCO) 5-325 mg per tablet, 1 tablet daily as needed , Disp: , Rfl:     loratadine (CLARITIN) 10 mg tablet, Take 10 mg by mouth daily, Disp: , Rfl:     Allergies   Allergen Reactions    Ampicillin Other (See Comments)     RASH    Epinephrine     Erythromycin Other (See Comments)    Iv Dye [Iodinated Diagnostic Agents]     Levofloxacin     Other Other (See Comments)    Penicillins     Procaine      Annotation - [id number]: When used with Epi    Moxifloxacin Rash     Swelling in lymphnodes       Social History   Past Surgical History:   Procedure Laterality Date     SECTION      EPIDURAL BLOCK INJECTION      back    EXPLORATORY LAPAROTOMY      HERNIA REPAIR      Last Assessed:  4/17/15    HYSTERECTOMY      Partial    ROTATOR CUFF REPAIR      Last Assessed:  4/17/15    SEPTOPLASTY       Family History   Problem Relation Age of Onset    Lupus Mother     Other Mother         Rheumatic disease    Diabetes Mother     Lung cancer Father 77        smoker    Blindness Brother     Heart attack Brother     Stroke Brother     Prostate cancer Brother     No Known Problems Maternal Grandmother     No Known Problems Maternal Grandfather     No Known Problems Paternal Grandmother     No Known Problems Paternal Grandfather     No Known Problems Son     No Known Problems Son        Objective:  /88 (BP Location: Left arm, Patient Position: Sitting, Cuff Size: Adult)   Pulse 88   Temp 97 6 °F (36 4 °C)   Ht 5' 1" (1 549 m)   Wt 50 7 kg (111 lb 12 8 oz) SpO2 99%   BMI 21 12 kg/m²     No results found for this or any previous visit (from the past 1344 hour(s))  Physical Exam  Vitals signs and nursing note reviewed  Constitutional:       General: She is not in acute distress  Appearance: She is well-developed  She is not diaphoretic  HENT:      Head: Normocephalic and atraumatic  Eyes:      General:         Right eye: No discharge  Left eye: No discharge  Conjunctiva/sclera: Conjunctivae normal       Pupils: Pupils are equal, round, and reactive to light  Neck:      Musculoskeletal: Normal range of motion and neck supple  Thyroid: No thyromegaly  Vascular: No JVD  Cardiovascular:      Rate and Rhythm: Normal rate and regular rhythm  Heart sounds: Normal heart sounds  No murmur  No friction rub  No gallop  Pulmonary:      Effort: Pulmonary effort is normal  No respiratory distress  Breath sounds: Normal breath sounds  No wheezing or rales  Chest:      Chest wall: No tenderness  Abdominal:      General: There is no distension  Palpations: Abdomen is soft  Tenderness: There is no abdominal tenderness  Musculoskeletal: Normal range of motion  General: No tenderness or deformity  Lymphadenopathy:      Cervical: No cervical adenopathy  Skin:     General: Skin is warm and dry  Coloration: Skin is not pale  Findings: No erythema or rash  Neurological:      Mental Status: She is alert and oriented to person, place, and time  Cranial Nerves: No cranial nerve deficit  Coordination: Coordination normal    Psychiatric:         Behavior: Behavior normal          Thought Content:  Thought content normal          Judgment: Judgment normal

## 2020-09-30 NOTE — ASSESSMENT & PLAN NOTE
Uncontrolled  Will increase Celexa from 10 mg daily to 20 mg daily  Continue alprazolam as 0 125 mg twice a day as needed for increased agitation/panic  Instructed for her to not take Xanax with Ambien

## 2020-10-27 ENCOUNTER — TELEPHONE (OUTPATIENT)
Dept: INTERNAL MEDICINE CLINIC | Facility: CLINIC | Age: 60
End: 2020-10-27

## 2020-10-29 ENCOUNTER — OFFICE VISIT (OUTPATIENT)
Dept: INTERNAL MEDICINE CLINIC | Facility: CLINIC | Age: 60
End: 2020-10-29
Payer: COMMERCIAL

## 2020-10-29 VITALS
WEIGHT: 112.8 LBS | BODY MASS INDEX: 21.3 KG/M2 | OXYGEN SATURATION: 97 % | HEIGHT: 61 IN | RESPIRATION RATE: 18 BRPM | TEMPERATURE: 97.2 F | HEART RATE: 83 BPM | DIASTOLIC BLOOD PRESSURE: 92 MMHG | SYSTOLIC BLOOD PRESSURE: 140 MMHG

## 2020-10-29 DIAGNOSIS — F41.1 GENERALIZED ANXIETY DISORDER: Primary | ICD-10-CM

## 2020-10-29 DIAGNOSIS — G47.09 OTHER INSOMNIA: ICD-10-CM

## 2020-10-29 DIAGNOSIS — F41.9 ANXIETY: ICD-10-CM

## 2020-10-29 PROCEDURE — 99213 OFFICE O/P EST LOW 20 MIN: CPT | Performed by: NURSE PRACTITIONER

## 2020-10-29 RX ORDER — BUSPIRONE HYDROCHLORIDE 5 MG/1
10 TABLET ORAL 2 TIMES DAILY
Qty: 60 TABLET | Refills: 0 | Status: SHIPPED | OUTPATIENT
Start: 2020-10-29 | End: 2020-10-29 | Stop reason: SDUPTHER

## 2020-10-29 RX ORDER — ZOLPIDEM TARTRATE 6.25 MG/1
6.25 TABLET, FILM COATED, EXTENDED RELEASE ORAL
Qty: 30 TABLET | Refills: 0 | Status: SHIPPED | OUTPATIENT
Start: 2020-10-29 | End: 2020-12-29 | Stop reason: SINTOL

## 2020-10-29 RX ORDER — BUSPIRONE HYDROCHLORIDE 5 MG/1
10 TABLET ORAL 2 TIMES DAILY
Qty: 120 TABLET | Refills: 0 | Status: SHIPPED | OUTPATIENT
Start: 2020-10-29 | End: 2020-12-29 | Stop reason: SDUPTHER

## 2020-11-03 ENCOUNTER — TELEPHONE (OUTPATIENT)
Dept: INTERNAL MEDICINE CLINIC | Facility: CLINIC | Age: 60
End: 2020-11-03

## 2020-11-12 ENCOUNTER — TELEPHONE (OUTPATIENT)
Dept: INTERNAL MEDICINE CLINIC | Facility: CLINIC | Age: 60
End: 2020-11-12

## 2020-11-25 ENCOUNTER — OFFICE VISIT (OUTPATIENT)
Dept: INTERNAL MEDICINE CLINIC | Facility: CLINIC | Age: 60
End: 2020-11-25
Payer: COMMERCIAL

## 2020-11-25 VITALS
WEIGHT: 112.6 LBS | RESPIRATION RATE: 18 BRPM | HEART RATE: 82 BPM | HEIGHT: 61 IN | TEMPERATURE: 99.4 F | DIASTOLIC BLOOD PRESSURE: 82 MMHG | BODY MASS INDEX: 21.26 KG/M2 | SYSTOLIC BLOOD PRESSURE: 130 MMHG | OXYGEN SATURATION: 99 %

## 2020-11-25 DIAGNOSIS — F41.9 ANXIETY: Primary | ICD-10-CM

## 2020-11-25 DIAGNOSIS — G47.09 OTHER INSOMNIA: ICD-10-CM

## 2020-11-25 PROCEDURE — 99213 OFFICE O/P EST LOW 20 MIN: CPT | Performed by: NURSE PRACTITIONER

## 2020-11-25 PROCEDURE — 3008F BODY MASS INDEX DOCD: CPT | Performed by: NURSE PRACTITIONER

## 2020-11-25 PROCEDURE — 1036F TOBACCO NON-USER: CPT | Performed by: NURSE PRACTITIONER

## 2020-12-01 DIAGNOSIS — G47.09 OTHER INSOMNIA: Primary | ICD-10-CM

## 2020-12-02 RX ORDER — ZOLPIDEM TARTRATE 5 MG/1
5 TABLET ORAL
Qty: 30 TABLET | Refills: 0 | Status: SHIPPED | OUTPATIENT
Start: 2020-12-02 | End: 2020-12-29 | Stop reason: SINTOL

## 2020-12-23 ENCOUNTER — TELEPHONE (OUTPATIENT)
Dept: INTERNAL MEDICINE CLINIC | Facility: CLINIC | Age: 60
End: 2020-12-23

## 2020-12-23 DIAGNOSIS — F41.9 ANXIETY: ICD-10-CM

## 2020-12-23 RX ORDER — CITALOPRAM 40 MG/1
40 TABLET ORAL DAILY
Qty: 30 TABLET | Refills: 0 | Status: SHIPPED | OUTPATIENT
Start: 2020-12-23 | End: 2021-01-27 | Stop reason: SDUPTHER

## 2020-12-29 DIAGNOSIS — G47.09 OTHER INSOMNIA: ICD-10-CM

## 2020-12-29 DIAGNOSIS — F41.1 GENERALIZED ANXIETY DISORDER: ICD-10-CM

## 2020-12-30 RX ORDER — ZOLPIDEM TARTRATE 5 MG/1
5 TABLET ORAL
Qty: 30 TABLET | Refills: 0 | Status: SHIPPED | OUTPATIENT
Start: 2020-12-30 | End: 2021-01-27 | Stop reason: SDUPTHER

## 2020-12-30 RX ORDER — BUSPIRONE HYDROCHLORIDE 5 MG/1
10 TABLET ORAL 2 TIMES DAILY
Qty: 120 TABLET | Refills: 3 | Status: SHIPPED | OUTPATIENT
Start: 2020-12-30 | End: 2021-02-26 | Stop reason: SDUPTHER

## 2021-01-26 NOTE — PROGRESS NOTES
This patient has not yet seen me but I got the results of her last mammogram from 8/2020 which was incomplete  Please call her to figure out if she has scheduled follow up?

## 2021-01-27 ENCOUNTER — TELEPHONE (OUTPATIENT)
Dept: OBGYN CLINIC | Facility: CLINIC | Age: 61
End: 2021-01-27

## 2021-01-27 DIAGNOSIS — F41.9 ANXIETY: ICD-10-CM

## 2021-01-27 DIAGNOSIS — G47.09 OTHER INSOMNIA: ICD-10-CM

## 2021-01-27 NOTE — TELEPHONE ENCOUNTER
Spoke to pt and she said she did have R side US and diagnotic done and they told her to repeat in 6 mo however, switch of ins and certain tiers permitted her from scheduling just yet and she also has a high deductible plan  Pt said when she had to switch from [facility] to having them done at [facility] things got a little messed up  She will go though for repeat imaging          ----- Message from Sahil Arias MD sent at 1/26/2021  4:54 PM EST -----  Regarding: mammogram results dated 08/20/2020-for your view only      ----- Message -----  From: Pat Kong MA  Sent: 1/20/2021   3:37 PM EST  To: Sahil Arias MD  Subject: mammogram results dated 08/20/2020-for your #    Report has been scanned to patients chart    ----- Message -----  From: Kameron Chung  Sent: 8/24/2020   3:34 PM EST  To: [facility]

## 2021-01-28 RX ORDER — ZOLPIDEM TARTRATE 5 MG/1
5 TABLET ORAL
Qty: 30 TABLET | Refills: 0 | Status: SHIPPED | OUTPATIENT
Start: 2021-01-28 | End: 2021-02-26 | Stop reason: SDUPTHER

## 2021-01-28 RX ORDER — CITALOPRAM 40 MG/1
40 TABLET ORAL DAILY
Qty: 30 TABLET | Refills: 5 | Status: SHIPPED | OUTPATIENT
Start: 2021-01-28 | End: 2021-08-23 | Stop reason: SDUPTHER

## 2021-02-11 ENCOUNTER — OFFICE VISIT (OUTPATIENT)
Dept: OBGYN CLINIC | Facility: CLINIC | Age: 61
End: 2021-02-11
Payer: COMMERCIAL

## 2021-02-11 VITALS
WEIGHT: 115 LBS | SYSTOLIC BLOOD PRESSURE: 104 MMHG | DIASTOLIC BLOOD PRESSURE: 64 MMHG | HEIGHT: 60 IN | BODY MASS INDEX: 22.58 KG/M2

## 2021-02-11 DIAGNOSIS — N95.2 ATROPHIC VAGINITIS: ICD-10-CM

## 2021-02-11 DIAGNOSIS — Z01.419 ENCNTR FOR GYN EXAM (GENERAL) (ROUTINE) W/O ABN FINDINGS: Primary | ICD-10-CM

## 2021-02-11 DIAGNOSIS — Z11.51 SCREENING FOR HUMAN PAPILLOMAVIRUS (HPV): ICD-10-CM

## 2021-02-11 PROCEDURE — S0610 ANNUAL GYNECOLOGICAL EXAMINA: HCPCS | Performed by: OBSTETRICS & GYNECOLOGY

## 2021-02-11 RX ORDER — TRETINOIN 0.5 MG/G
CREAM TOPICAL
COMMUNITY
Start: 2020-12-30

## 2021-02-11 RX ORDER — CITALOPRAM 20 MG/1
20 TABLET ORAL DAILY
COMMUNITY
Start: 2021-01-28 | End: 2021-03-17 | Stop reason: SDUPTHER

## 2021-02-11 RX ORDER — SULFACETAMIDE SODIUM 100 MG/ML
LOTION TOPICAL
COMMUNITY
Start: 2020-12-16

## 2021-02-11 NOTE — PROGRESS NOTES
Shamar Lugo   1960    CC:  Yearly exam    S:  64 y o  female here for yearly exam  She is postmenopausal and has had no vaginal bleeding  She denies vaginal discharge, itching, odor or dryness  Sexual activity: She is sexually active without pain, bleeding or dryness  We had discussed the estrace cream in the past, but the prescription was more expensive than she was expecting  We will try premarin instead to see if it is cheaper on her insurance  Last Pap: none; s/p hysterectomy  Last Mammo: 8/24/2020 - BIRAD-0; 9/29/2020 - BIRAD-3; six month repeat scheduled  Last Colonoscopy: age 48; normal per patient but uncertain if she will get another at 12 years as she had significant bowel spasm after her first  Last DEXA: never    We reviewed ASCCP guidelines for Pap testing         Current Outpatient Medications:     ALPRAZolam (XANAX) 0 25 mg tablet, Take 0 5 tablets (0 125 mg total) by mouth 2 (two) times a day as needed for anxiety, Disp: 30 tablet, Rfl: 0    busPIRone (BUSPAR) 5 mg tablet, Take 2 tablets (10 mg total) by mouth 2 (two) times a day, Disp: 120 tablet, Rfl: 3    citalopram (CeleXA) 40 mg tablet, Take 1 tablet (40 mg total) by mouth daily, Disp: 30 tablet, Rfl: 5    fluticasone (FLONASE) 50 mcg/act nasal spray, 2 sprays into each nostril daily (Patient taking differently: into each nostril as needed ), Disp: 1 Bottle, Rfl: 11    PreviDent 5000 Sensitive 1 1-5 % PSTE, As directed, Disp: , Rfl:     Sulfacetamide Sodium, Acne, 10 % LOTN, APPLY TOPICALLY TO FACE EVERY MORNING, Disp: , Rfl:     tretinoin (REFISSA) 0 05 % cream, APPLY PEA SIZED AMOUNT TO ENTIRE FACE EVERY NIGHT AT BEDTIME 1 HOUR AFTER WASHING FACE, Disp: , Rfl:     zolpidem (AMBIEN) 5 mg tablet, Take 1 tablet (5 mg total) by mouth daily at bedtime as needed for sleep, Disp: 30 tablet, Rfl: 0    citalopram (CeleXA) 20 mg tablet, Take 20 mg by mouth daily, Disp: , Rfl:     HYDROcodone-acetaminophen (NORCO) 5-325 mg per tablet, 1 tablet daily as needed , Disp: , Rfl:     ibuprofen (MOTRIN) 100 mg/5 mL suspension, Take 200 mg by mouth every 6 (six) hours as needed for mild pain, Disp: , Rfl:     loratadine (CLARITIN) 10 mg tablet, Take 10 mg by mouth as needed , Disp: , Rfl:     simvastatin (ZOCOR) 10 mg tablet, Take 1 tablet (10 mg total) by mouth daily at bedtime (Patient not taking: Reported on 10/29/2020), Disp: 30 tablet, Rfl: 1  Social History     Socioeconomic History    Marital status: Single     Spouse name: Not on file    Number of children: 2    Years of education: Not on file    Highest education level: Not on file   Occupational History    Occupation: Physical therapist   Social Needs    Financial resource strain: Not on file    Food insecurity     Worry: Not on file     Inability: Not on file   [employer] needs     Medical: Not on file     Non-medical: Not on file   Tobacco Use    Smoking status: Never Smoker    Smokeless tobacco: Never Used   Substance and Sexual Activity    Alcohol use:  Yes     Alcohol/week: 1 0 - 2 0 standard drinks     Types: 1 - 2 Standard drinks or equivalent per week     Frequency: 2-3 times a week     Drinks per session: 1 or 2     Binge frequency: Never     Comment: Socially    Drug use: No    Sexual activity: Yes     Partners: Male     Birth control/protection: Surgical   Lifestyle    Physical activity     Days per week: Not on file     Minutes per session: Not on file    Stress: Not on file   Relationships    Social connections     Talks on phone: Not on file     Gets together: Not on file     Attends Christian service: Not on file     Active member of club or organization: Not on file     Attends meetings of clubs or organizations: Not on file     Relationship status: Not on file    Intimate partner violence     Fear of current or ex partner: Not on file     Emotionally abused: Not on file     Physically abused: Not on file     Forced sexual activity: Not on file Other Topics Concern    Not on file   Social History Narrative    Caffeine use:  She admits to consuming caffeine via coffee (1 serving per day)    Exercises regularly:  Primary form of exercise is aerobics and weightlifting  Frequency is 3-5 times per week    Per [employer]:       Family History   Problem Relation Age of Onset    Lupus Mother     Other Mother         Rheumatic disease    Diabetes Mother     Lung cancer Father 77        smoker    Blindness Brother     Heart attack Brother     Stroke Brother     Prostate cancer Brother     No Known Problems Maternal Grandmother     No Known Problems Maternal Grandfather     No Known Problems Paternal Grandmother     No Known Problems Paternal Grandfather     No Known Problems Son     No Known Problems Son      Past Medical History:   Diagnosis Date    Anxiety     Arthritis     Last Assessed:  4/17/15    Herniated disc, cervical     Lipids abnormal     Last Assessed:  8/5/16    Spinal stenosis         Review of Systems   Respiratory: Negative  Cardiovascular: Negative  Gastrointestinal: Negative for constipation and diarrhea  Genitourinary: Negative for difficulty urinating, pelvic pain, vaginal bleeding, vaginal discharge, itching or odor  O:  Blood pressure 104/64, height 5' (1 524 m), weight 52 2 kg (115 lb)  Patient appears well and is not in distress  Neck is supple without masses  Breasts are symmetrical without mass, tenderness, nipple discharge, skin changes or adenopathy  Abdomen is soft and nontender without masses  External genitals are normal without lesions or rashes  +right Bartholin's cyst - stable per patient  Urethral meatus and urethra are normal  Bladder is normal to palpation  Vagina is normal without discharge or bleeding  Cervix is surgically absent  Uterus is  surgically absent  Adnexa are normal, nontender, without palpable mass       A:  Yearly exam      P:   Pap no longer indicated   Rx premarin vaginal cream sent  Mammo slip given   Colonoscopy due age 66   DEXA due age 74 unless new risk factors develop    RTO one year for yearly exam or sooner as needed

## 2021-02-16 ENCOUNTER — TELEPHONE (OUTPATIENT)
Dept: OBGYN CLINIC | Facility: CLINIC | Age: 61
End: 2021-02-16

## 2021-02-16 DIAGNOSIS — N95.2 ATROPHIC VAGINITIS: Primary | ICD-10-CM

## 2021-02-16 RX ORDER — ESTRADIOL 0.1 MG/G
CREAM VAGINAL
Qty: 42.5 G | Refills: 5 | Status: SHIPPED | OUTPATIENT
Start: 2021-02-16 | End: 2022-08-03 | Stop reason: SDUPTHER

## 2021-02-26 ENCOUNTER — TELEPHONE (OUTPATIENT)
Dept: INTERNAL MEDICINE CLINIC | Facility: CLINIC | Age: 61
End: 2021-02-26

## 2021-02-26 DIAGNOSIS — F41.1 GENERALIZED ANXIETY DISORDER: ICD-10-CM

## 2021-02-26 DIAGNOSIS — G47.09 OTHER INSOMNIA: ICD-10-CM

## 2021-02-26 RX ORDER — ZOLPIDEM TARTRATE 5 MG/1
5 TABLET ORAL
Qty: 30 TABLET | Refills: 0 | Status: SHIPPED | OUTPATIENT
Start: 2021-02-26 | End: 2021-03-31 | Stop reason: SDUPTHER

## 2021-02-26 RX ORDER — BUSPIRONE HYDROCHLORIDE 5 MG/1
10 TABLET ORAL 2 TIMES DAILY
Qty: 120 TABLET | Refills: 3 | Status: SHIPPED | OUTPATIENT
Start: 2021-02-26 | End: 2021-03-17 | Stop reason: SDUPTHER

## 2021-02-26 NOTE — TELEPHONE ENCOUNTER
Pt was fine with either one listed as pcp, she said to leave it how it is but she likes seeing both providers

## 2021-03-17 ENCOUNTER — OFFICE VISIT (OUTPATIENT)
Dept: INTERNAL MEDICINE CLINIC | Facility: CLINIC | Age: 61
End: 2021-03-17
Payer: COMMERCIAL

## 2021-03-17 VITALS
SYSTOLIC BLOOD PRESSURE: 126 MMHG | HEART RATE: 68 BPM | HEIGHT: 61 IN | BODY MASS INDEX: 21.71 KG/M2 | TEMPERATURE: 97.5 F | WEIGHT: 115 LBS | OXYGEN SATURATION: 99 % | DIASTOLIC BLOOD PRESSURE: 74 MMHG

## 2021-03-17 DIAGNOSIS — E78.89 LIPIDS ABNORMAL: Primary | ICD-10-CM

## 2021-03-17 DIAGNOSIS — G47.09 OTHER INSOMNIA: ICD-10-CM

## 2021-03-17 DIAGNOSIS — F41.1 GENERALIZED ANXIETY DISORDER: ICD-10-CM

## 2021-03-17 DIAGNOSIS — F41.9 ANXIETY: ICD-10-CM

## 2021-03-17 DIAGNOSIS — Z11.4 SCREENING FOR HIV (HUMAN IMMUNODEFICIENCY VIRUS): ICD-10-CM

## 2021-03-17 PROCEDURE — 1036F TOBACCO NON-USER: CPT | Performed by: NURSE PRACTITIONER

## 2021-03-17 PROCEDURE — 3008F BODY MASS INDEX DOCD: CPT | Performed by: NURSE PRACTITIONER

## 2021-03-17 PROCEDURE — 3725F SCREEN DEPRESSION PERFORMED: CPT | Performed by: NURSE PRACTITIONER

## 2021-03-17 PROCEDURE — 99214 OFFICE O/P EST MOD 30 MIN: CPT | Performed by: NURSE PRACTITIONER

## 2021-03-17 RX ORDER — BUSPIRONE HYDROCHLORIDE 15 MG/1
15 TABLET ORAL 2 TIMES DAILY
Qty: 180 TABLET | Refills: 1 | Status: SHIPPED | OUTPATIENT
Start: 2021-03-17 | End: 2021-09-27 | Stop reason: SDUPTHER

## 2021-03-17 RX ORDER — ALPRAZOLAM 0.25 MG/1
0.12 TABLET ORAL 2 TIMES DAILY PRN
Qty: 30 TABLET | Refills: 0 | Status: SHIPPED | OUTPATIENT
Start: 2021-03-17 | End: 2022-01-17 | Stop reason: ALTCHOICE

## 2021-03-17 NOTE — PROGRESS NOTES
Assessment/Plan:    Anxiety  Uncontrolled, but noted improvement  Patient will continue on Celexa 40mg,  Patient uses Xanax very sparingly  Will increase patient's BuSpar to 15 mg tablet b i d  Follow up in 6 months or sooner  Other insomnia  Controlled with Ambien  Lipids abnormal  Will get updated lipid panel and fasting blood work  Recommend healthy lifestyle choices for your cholesterol  Low fat/low cholesterol diet  Limit/avoid red meat  Eat more lean meat - chicken breast, ground turkey, fish  Exercise 30 mins at least 5 times a week as tolerated  Diagnoses and all orders for this visit:    Lipids abnormal  -     Lipid panel  -     Comprehensive metabolic panel; Future  -     CBC and differential    Anxiety  -     ALPRAZolam (XANAX) 0 25 mg tablet; Take 0 5 tablets (0 125 mg total) by mouth 2 (two) times a day as needed for anxiety    Generalized anxiety disorder  -     busPIRone (BUSPAR) 15 mg tablet; Take 1 tablet (15 mg total) by mouth 2 (two) times a day    Other insomnia  -     busPIRone (BUSPAR) 15 mg tablet; Take 1 tablet (15 mg total) by mouth 2 (two) times a day    Screening for HIV (human immunodeficiency virus)  -     HIV 1/2 Antigen/Antibody (4th Generation) w Reflex [facility]; Future          Subjective:      Patient ID: Olu Ramirez is a 64 y o  female  Patient presents today to follow-up on anxiety  Patient reports she is doing well on current dose of Celexa she is experiencing no side effects  At last office visit patient gets Ambien was increased, patient reports she uses Xanax sparingly  Patient reports that overall her depression seems to be controlled however she does experience some mild anxiety still  Denies any panic attacks      Note from previous office visit:  Patient presents today to follow-up on anxiety at last office visit patient was advised to continue increased dose of Celexa, advised she may take xanax twice daily, and add on BuSpar 10 mg tablet daily  Patient also was switched to Ambien extended release  Patient reports the 1st when taking Ambien extended release and BuSpar she experienced headaches however she has gone back to Ambien regular does and continue to take BuSpar 5 mg tablet daily and has been feeling better overall, she states that she has not needed her Xanax at all over the past 2 weeks  Note from previous office visit:  Patient presents today with complaints of uncontrolled anxiety  Patient was seen 1 month ago by Dr Adeel Conley at which point patient was restarted on Ambien and her Celexa was increased  Patient has been continuing to experience a lot of increased anxiety due to work stress  Patient has been compliant with current medication regimen  Patient continues to deny panic  Patient has been taking Ambien for insomnia however she reports that she is only getting approximately 4 hours of sleep on this medication  Patient continues to need Xanax twice daily  Anxiety  Presents for follow-up visit  Symptoms include excessive worry, insomnia and nervous/anxious behavior  Patient reports no chest pain, dizziness, nausea, palpitations, shortness of breath or suicidal ideas  Symptoms occur constantly  The severity of symptoms is causing significant distress and mild  The patient sleeps 4 hours per night  The quality of sleep is non-restorative  Compliance with medications is %  The following portions of the patient's history were reviewed and updated as appropriate: allergies, current medications, past family history, past medical history, past social history, past surgical history and problem list     Review of Systems   Constitutional: Negative for activity change, appetite change, chills, diaphoresis and fever  HENT: Negative for congestion, ear discharge, ear pain, postnasal drip, rhinorrhea, sinus pressure, sinus pain and sore throat      Eyes: Negative for pain, discharge, itching and visual disturbance  Respiratory: Negative for cough, chest tightness, shortness of breath and wheezing  Cardiovascular: Negative for chest pain, palpitations and leg swelling  Gastrointestinal: Negative for abdominal pain, constipation, diarrhea, nausea and vomiting  Endocrine: Negative for polydipsia, polyphagia and polyuria  Genitourinary: Negative for difficulty urinating, dysuria and urgency  Musculoskeletal: Negative for arthralgias, back pain and neck pain  Skin: Negative for rash and wound  Neurological: Negative for dizziness, weakness, numbness and headaches  Psychiatric/Behavioral: Negative for suicidal ideas  The patient is nervous/anxious and has insomnia            Past Medical History:   Diagnosis Date    Anxiety     Arthritis     Last Assessed:  4/17/15    Herniated disc, cervical     Lipids abnormal     Last Assessed:  8/5/16    Spinal stenosis          Current Outpatient Medications:     ALPRAZolam (XANAX) 0 25 mg tablet, Take 0 5 tablets (0 125 mg total) by mouth 2 (two) times a day as needed for anxiety, Disp: 30 tablet, Rfl: 0    busPIRone (BUSPAR) 15 mg tablet, Take 1 tablet (15 mg total) by mouth 2 (two) times a day, Disp: 180 tablet, Rfl: 1    citalopram (CeleXA) 40 mg tablet, Take 1 tablet (40 mg total) by mouth daily, Disp: 30 tablet, Rfl: 5    estradiol (ESTRACE) 0 1 mg/g vaginal cream, Insert 1 g into the vagina daily for 14 days, THEN 1 g 2 (two) times a week , Disp: 42 5 g, Rfl: 5    fluticasone (FLONASE) 50 mcg/act nasal spray, 2 sprays into each nostril daily (Patient taking differently: into each nostril as needed ), Disp: 1 Bottle, Rfl: 11    ibuprofen (MOTRIN) 100 mg/5 mL suspension, Take 200 mg by mouth every 6 (six) hours as needed for mild pain, Disp: , Rfl:     loratadine (CLARITIN) 10 mg tablet, Take 10 mg by mouth as needed , Disp: , Rfl:     PreviDent 5000 Sensitive 1 1-5 % PSTE, As directed, Disp: , Rfl:     Sulfacetamide Sodium, Acne, 10 % LOTN, APPLY TOPICALLY TO FACE EVERY MORNING, Disp: , Rfl:     tretinoin (REFISSA) 0 05 % cream, APPLY PEA SIZED AMOUNT TO ENTIRE FACE EVERY NIGHT AT BEDTIME 1 HOUR AFTER WASHING FACE, Disp: , Rfl:     zolpidem (AMBIEN) 5 mg tablet, Take 1 tablet (5 mg total) by mouth daily at bedtime as needed for sleep, Disp: 30 tablet, Rfl: 0    Allergies   Allergen Reactions    Ampicillin Other (See Comments)     RASH    Epinephrine     Erythromycin Other (See Comments)    Iv Dye [Iodinated Diagnostic Agents]     Levofloxacin     Other Other (See Comments)    Penicillins     Procaine      Annotation - [id number]: When used with Epi    Moxifloxacin Rash     Swelling in lymphnodes       Social History   Past Surgical History:   Procedure Laterality Date     SECTION      EPIDURAL BLOCK INJECTION      back    EXPLORATORY LAPAROTOMY      HERNIA REPAIR      Last Assessed:  4/17/15    HYSTERECTOMY      Partial    ROTATOR CUFF REPAIR      Last Assessed:  4/17/15    SEPTOPLASTY       Family History   Problem Relation Age of Onset    Lupus Mother     Other Mother         Rheumatic disease    Diabetes Mother     Lung cancer Father 77        smoker    Blindness Brother     Heart attack Brother     Stroke Brother     Prostate cancer Brother     No Known Problems Maternal Grandmother     No Known Problems Maternal Grandfather     No Known Problems Paternal Grandmother     No Known Problems Paternal Grandfather     No Known Problems Son     No Known Problems Son        Objective:  /74 (BP Location: Left arm, Patient Position: Sitting, Cuff Size: Adult)   Pulse 68   Temp 97 5 °F (36 4 °C)   Ht 5' 1" (1 549 m)   Wt 52 2 kg (115 lb)   LMP  (LMP Unknown)   SpO2 99%   BMI 21 73 kg/m²     No results found for this or any previous visit (from the past 1344 hour(s))  Physical Exam  Constitutional:       General: She is not in acute distress  Appearance: She is well-developed   She is not diaphoretic  HENT:      Head: Normocephalic and atraumatic  Right Ear: External ear normal       Left Ear: External ear normal       Nose: Nose normal       Mouth/Throat:      Pharynx: No oropharyngeal exudate  Eyes:      General:         Right eye: No discharge  Left eye: No discharge  Conjunctiva/sclera: Conjunctivae normal       Pupils: Pupils are equal, round, and reactive to light  Neck:      Musculoskeletal: Normal range of motion and neck supple  Thyroid: No thyromegaly  Cardiovascular:      Rate and Rhythm: Normal rate and regular rhythm  Heart sounds: Normal heart sounds  No murmur  No friction rub  No gallop  Pulmonary:      Effort: Pulmonary effort is normal  No respiratory distress  Breath sounds: Normal breath sounds  No stridor  No wheezing or rales  Abdominal:      General: Bowel sounds are normal  There is no distension  Palpations: Abdomen is soft  Tenderness: There is no abdominal tenderness  Lymphadenopathy:      Cervical: No cervical adenopathy  Skin:     General: Skin is warm and dry  Findings: No erythema or rash  Neurological:      Mental Status: She is alert and oriented to person, place, and time  Psychiatric:         Behavior: Behavior normal          Thought Content:  Thought content normal          Judgment: Judgment normal

## 2021-03-17 NOTE — ASSESSMENT & PLAN NOTE
Will get updated lipid panel and fasting blood work  Recommend healthy lifestyle choices for your cholesterol  Low fat/low cholesterol diet  Limit/avoid red meat  Eat more lean meat - chicken breast, ground turkey, fish  Exercise 30 mins at least 5 times a week as tolerated

## 2021-03-17 NOTE — ASSESSMENT & PLAN NOTE
Uncontrolled, but noted improvement  Patient will continue on Celexa 40mg,  Patient uses Xanax very sparingly  Will increase patient's BuSpar to 15 mg tablet b i d  Follow up in 6 months or sooner

## 2021-03-22 DIAGNOSIS — F41.9 ANXIETY: ICD-10-CM

## 2021-03-22 RX ORDER — CITALOPRAM 20 MG/1
TABLET ORAL
Qty: 30 TABLET | Refills: 5 | OUTPATIENT
Start: 2021-03-22

## 2021-03-31 DIAGNOSIS — G47.09 OTHER INSOMNIA: ICD-10-CM

## 2021-03-31 RX ORDER — ZOLPIDEM TARTRATE 5 MG/1
5 TABLET ORAL
Qty: 30 TABLET | Refills: 0 | Status: SHIPPED | OUTPATIENT
Start: 2021-03-31 | End: 2021-05-03 | Stop reason: SDUPTHER

## 2021-04-08 LAB — EXTERNAL HIV SCREEN: NORMAL

## 2021-04-28 ENCOUNTER — TELEPHONE (OUTPATIENT)
Dept: INTERNAL MEDICINE CLINIC | Facility: CLINIC | Age: 61
End: 2021-04-28

## 2021-04-28 NOTE — TELEPHONE ENCOUNTER
I received a [facility]NL lab settlement that the patient dropped off asking that the coding be re-evaluated for thoroughness and updated if appropriate  I called [facility] and the amounts are part of the patient's deductible  [facility] in billing did not know if the patient's plan covers preventative care and if this testing is subject to her deductible  I corrected the coding for:  1  CBC originally coded as lipids abnormal was changed to Z13 0 - screening for blood disorders  2  CMP originally coded as lipids abnormal was changed to Z13 228 - screening for metabolic disorders    The Lipid panel and HIV testing was coded correctly  I called the patient with the update and advised that she follow up with [facility] billing for further billing issues for this DOS

## 2021-05-03 DIAGNOSIS — G47.09 OTHER INSOMNIA: ICD-10-CM

## 2021-05-03 RX ORDER — ZOLPIDEM TARTRATE 5 MG/1
5 TABLET ORAL
Qty: 30 TABLET | Refills: 0 | Status: SHIPPED | OUTPATIENT
Start: 2021-05-03 | End: 2021-05-26 | Stop reason: SDUPTHER

## 2021-05-26 DIAGNOSIS — G47.09 OTHER INSOMNIA: ICD-10-CM

## 2021-05-26 RX ORDER — ZOLPIDEM TARTRATE 5 MG/1
5 TABLET ORAL
Qty: 30 TABLET | Refills: 0 | Status: SHIPPED | OUTPATIENT
Start: 2021-05-26 | End: 2021-07-19 | Stop reason: SDUPTHER

## 2021-05-26 NOTE — TELEPHONE ENCOUNTER
Teresa Ellison on PTO please advise    3/17/2021  9/20/2021  Pt called and stated that she will be going out of town Friday and asking if this could be filled early she will run out while she is away

## 2021-06-08 ENCOUNTER — OFFICE VISIT (OUTPATIENT)
Dept: INTERNAL MEDICINE CLINIC | Facility: CLINIC | Age: 61
End: 2021-06-08
Payer: COMMERCIAL

## 2021-06-08 VITALS
TEMPERATURE: 97.7 F | HEIGHT: 61 IN | OXYGEN SATURATION: 100 % | BODY MASS INDEX: 21.37 KG/M2 | HEART RATE: 78 BPM | SYSTOLIC BLOOD PRESSURE: 128 MMHG | WEIGHT: 113.2 LBS | DIASTOLIC BLOOD PRESSURE: 82 MMHG

## 2021-06-08 DIAGNOSIS — M47.817 LUMBOSACRAL SPONDYLOSIS WITHOUT MYELOPATHY: Primary | ICD-10-CM

## 2021-06-08 DIAGNOSIS — M47.812 CERVICAL SPONDYLOSIS WITHOUT MYELOPATHY: ICD-10-CM

## 2021-06-08 DIAGNOSIS — M54.12 CERVICAL RADICULOPATHY: ICD-10-CM

## 2021-06-08 PROCEDURE — 1036F TOBACCO NON-USER: CPT | Performed by: NURSE PRACTITIONER

## 2021-06-08 PROCEDURE — 99213 OFFICE O/P EST LOW 20 MIN: CPT | Performed by: NURSE PRACTITIONER

## 2021-06-08 PROCEDURE — 3008F BODY MASS INDEX DOCD: CPT | Performed by: NURSE PRACTITIONER

## 2021-06-08 RX ORDER — METHYLPREDNISOLONE 4 MG/1
TABLET ORAL
Qty: 21 EACH | Refills: 0 | Status: SHIPPED | OUTPATIENT
Start: 2021-06-08 | End: 2021-07-09 | Stop reason: ALTCHOICE

## 2021-06-08 RX ORDER — METHOCARBAMOL 750 MG/1
750 TABLET, FILM COATED ORAL 3 TIMES DAILY
Qty: 60 TABLET | Refills: 0 | Status: SHIPPED | OUTPATIENT
Start: 2021-06-08 | End: 2021-07-09 | Stop reason: ALTCHOICE

## 2021-06-08 NOTE — PROGRESS NOTES
Assessment/Plan:       Problem List Items Addressed This Visit        Nervous and Auditory    Cervical radiculopathy    Relevant Medications    methocarbamol (ROBAXIN) 750 mg tablet    methylPREDNISolone 4 MG tablet therapy pack       Musculoskeletal and Integument    Lumbosacral spondylosis without myelopathy - Primary    Relevant Medications    methocarbamol (ROBAXIN) 750 mg tablet    methylPREDNISolone 4 MG tablet therapy pack    Cervical spondylosis without myelopathy    Relevant Medications    methocarbamol (ROBAXIN) 750 mg tablet    methylPREDNISolone 4 MG tablet therapy pack          Start medrol dose pack  Start Robaxin for muscle spasms  Avoid aggravating behaviors  Keep appointment with Dr Coy Carlin  F/u if symptoms worsen or fail to improve  Subjective:      Patient ID: Rosie Duncan is a 64 y o  female  Patient has a chronic neck and back condition  She last saw Dr Coy Carlin at [facility] for last year for an injection in her neck, and had been doing well  A couple of weeks ago, she started to have pain in her left leg  She then went away to a cabin and slept in a different bed, and went ATVing  She thinks that this flared her chronic back pain  She notes that she is limited in her function d/t her pain down her left leg, which is severe  She is taking tylenol, ibuprofen, applying heat to try to help her, which is not working too well for her  She has an appt with Dr Coy Carlin on 6/21 and cant be seen sooner, was recommended to follow with her PCP  Back Pain  This is a recurrent problem  The current episode started more than 1 year ago  The problem occurs constantly  The problem is unchanged (for this flare)  The pain is present in the lumbar spine (left lower back in to left hip, and then to anterlateral side of left knee)  The quality of the pain is described as aching and burning  The pain radiates to the left knee and left thigh   Pain severity now: mild-mod when sitting, on movement its severe  The pain is worse during the day  The symptoms are aggravated by bending, position and standing  Stiffness is present in the morning  Associated symptoms include leg pain and tingling (left anterolateral thigh)  Pertinent negatives include no abdominal pain, bladder incontinence, bowel incontinence, chest pain, dysuria, fever, headaches, numbness, paresthesias, pelvic pain, perianal numbness or weakness  Risk factors: history of chronic neck and back pain  She has tried NSAIDs for the symptoms  The treatment provided no relief  The following portions of the patient's history were reviewed and updated as appropriate: allergies, current medications, past family history, past medical history, past social history, past surgical history and problem list     Review of Systems   Constitutional: Negative for fever  HENT: Negative for trouble swallowing  Respiratory: Negative for shortness of breath and wheezing  Cardiovascular: Negative for chest pain  Gastrointestinal: Negative for abdominal pain, bowel incontinence, constipation, diarrhea, nausea and vomiting  Genitourinary: Negative for bladder incontinence, dysuria and pelvic pain  Musculoskeletal: Positive for back pain and gait problem  Skin: Negative for rash and wound  Neurological: Positive for tingling (left anterolateral thigh)  Negative for dizziness, weakness, numbness, headaches and paresthesias           Past Medical History:   Diagnosis Date    Anxiety     Arthritis     Last Assessed:  4/17/15    Herniated disc, cervical     Lipids abnormal     Last Assessed:  8/5/16    Spinal stenosis          Current Outpatient Medications:     ALPRAZolam (XANAX) 0 25 mg tablet, Take 0 5 tablets (0 125 mg total) by mouth 2 (two) times a day as needed for anxiety, Disp: 30 tablet, Rfl: 0    busPIRone (BUSPAR) 15 mg tablet, Take 1 tablet (15 mg total) by mouth 2 (two) times a day, Disp: 180 tablet, Rfl: 1    citalopram (CeleXA) 40 mg tablet, Take 1 tablet (40 mg total) by mouth daily, Disp: 30 tablet, Rfl: 5    estradiol (ESTRACE) 0 1 mg/g vaginal cream, Insert 1 g into the vagina daily for 14 days, THEN 1 g 2 (two) times a week , Disp: 42 5 g, Rfl: 5    fluticasone (FLONASE) 50 mcg/act nasal spray, 2 sprays into each nostril daily (Patient taking differently: into each nostril as needed ), Disp: 1 Bottle, Rfl: 11    ibuprofen (MOTRIN) 100 mg/5 mL suspension, Take 200 mg by mouth every 6 (six) hours as needed for mild pain, Disp: , Rfl:     loratadine (CLARITIN) 10 mg tablet, Take 10 mg by mouth as needed , Disp: , Rfl:     PreviDent 5000 Sensitive 1 1-5 % PSTE, As directed, Disp: , Rfl:     Sulfacetamide Sodium, Acne, 10 % LOTN, APPLY TOPICALLY TO FACE EVERY MORNING, Disp: , Rfl:     tretinoin (REFISSA) 0 05 % cream, APPLY PEA SIZED AMOUNT TO ENTIRE FACE EVERY NIGHT AT BEDTIME 1 HOUR AFTER WASHING FACE, Disp: , Rfl:     zolpidem (AMBIEN) 5 mg tablet, Take 1 tablet (5 mg total) by mouth daily at bedtime as needed for sleep, Disp: 30 tablet, Rfl: 0    methocarbamol (ROBAXIN) 750 mg tablet, Take 1 tablet (750 mg total) by mouth 3 (three) times a day, Disp: 60 tablet, Rfl: 0    methylPREDNISolone 4 MG tablet therapy pack, Use as directed on package, Disp: 21 each, Rfl: 0    Allergies   Allergen Reactions    Ampicillin Other (See Comments)     RASH    Epinephrine     Erythromycin Other (See Comments)    Iv Dye [Iodinated Diagnostic Agents]     Levofloxacin     Other Other (See Comments)    Penicillins     Procaine      Annotation - [id number]: When used with Epi    Moxifloxacin Rash     Swelling in lymphnodes       Social History   Past Surgical History:   Procedure Laterality Date     SECTION      EPIDURAL BLOCK INJECTION      back    EXPLORATORY LAPAROTOMY      HERNIA REPAIR      Last Assessed:  4/17/15    HYSTERECTOMY      Partial    ROTATOR CUFF REPAIR      Last Assessed:  4/17/15    SEPTOPLASTY Family History   Problem Relation Age of Onset    Lupus Mother     Other Mother         Rheumatic disease    Diabetes Mother     Lung cancer Father 77        smoker    Blindness Brother     Heart attack Brother     Stroke Brother     Prostate cancer Brother     No Known Problems Maternal Grandmother     No Known Problems Maternal Grandfather     No Known Problems Paternal Grandmother     No Known Problems Paternal Grandfather     No Known Problems Son     No Known Problems Son        Objective:  /82 (BP Location: Left arm, Patient Position: Sitting, Cuff Size: Standard)   Pulse 78   Temp 97 7 °F (36 5 °C) (Tympanic)   Ht 5' 1" (1 549 m)   Wt 51 3 kg (113 lb 3 2 oz)   LMP  (LMP Unknown)   SpO2 100%   BMI 21 39 kg/m²        Physical Exam  Constitutional:       General: She is not in acute distress  Appearance: She is well-developed  HENT:      Head: Normocephalic and atraumatic  Right Ear: External ear normal       Left Ear: External ear normal       Mouth/Throat:      Pharynx: No oropharyngeal exudate  Eyes:      General: No scleral icterus  Pupils: Pupils are equal, round, and reactive to light  Neck:      Musculoskeletal: Normal range of motion and neck supple  Cardiovascular:      Rate and Rhythm: Normal rate and regular rhythm  Pulmonary:      Effort: Pulmonary effort is normal       Breath sounds: Normal breath sounds  Abdominal:      Palpations: Abdomen is soft  Musculoskeletal:         General: Tenderness (left lumbosacral area) present  Comments: +left straight leg test   Lymphadenopathy:      Cervical: No cervical adenopathy  Skin:     General: Skin is warm  Neurological:      Mental Status: She is alert and oriented to person, place, and time     Psychiatric:         Behavior: Behavior normal

## 2021-07-09 ENCOUNTER — OFFICE VISIT (OUTPATIENT)
Dept: INTERNAL MEDICINE CLINIC | Facility: CLINIC | Age: 61
End: 2021-07-09
Payer: COMMERCIAL

## 2021-07-09 VITALS
SYSTOLIC BLOOD PRESSURE: 148 MMHG | TEMPERATURE: 98 F | WEIGHT: 108 LBS | DIASTOLIC BLOOD PRESSURE: 80 MMHG | HEART RATE: 88 BPM | HEIGHT: 61 IN | BODY MASS INDEX: 20.39 KG/M2 | OXYGEN SATURATION: 99 %

## 2021-07-09 DIAGNOSIS — Z12.31 ENCOUNTER FOR SCREENING MAMMOGRAM FOR MALIGNANT NEOPLASM OF BREAST: Primary | ICD-10-CM

## 2021-07-09 DIAGNOSIS — M47.817 LUMBOSACRAL SPONDYLOSIS WITHOUT MYELOPATHY: ICD-10-CM

## 2021-07-09 PROCEDURE — 99213 OFFICE O/P EST LOW 20 MIN: CPT | Performed by: NURSE PRACTITIONER

## 2021-07-09 PROCEDURE — 1036F TOBACCO NON-USER: CPT | Performed by: NURSE PRACTITIONER

## 2021-07-09 PROCEDURE — 3008F BODY MASS INDEX DOCD: CPT | Performed by: NURSE PRACTITIONER

## 2021-07-09 RX ORDER — MELOXICAM 7.5 MG/1
7.5 TABLET ORAL DAILY
Qty: 30 TABLET | Refills: 2 | Status: SHIPPED | OUTPATIENT
Start: 2021-07-09 | End: 2021-09-01

## 2021-07-09 RX ORDER — CYCLOBENZAPRINE HCL 5 MG
5 TABLET ORAL 3 TIMES DAILY PRN
Qty: 30 TABLET | Refills: 2 | Status: SHIPPED | OUTPATIENT
Start: 2021-07-09 | End: 2021-09-01

## 2021-07-09 NOTE — ASSESSMENT & PLAN NOTE
Will start meloxicam 7 5 mg daily with food  Advised she can try increasing to 15 mg if needed  I did caution on the concomitant use of Celexa, monitor for upset stomach and any change in stools or blood in stools      Stop ibuprofen while on meloxicam  Stop Robaxin    Will start Flexeril 5 mg and advised patient she can increase to 10 if needed    Follow-up with PCP in about 6 weeks  Continue to follow with pain management as well
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
## 2021-07-19 DIAGNOSIS — G47.09 OTHER INSOMNIA: ICD-10-CM

## 2021-07-19 RX ORDER — ZOLPIDEM TARTRATE 5 MG/1
5 TABLET ORAL
Qty: 30 TABLET | Refills: 0 | Status: SHIPPED | OUTPATIENT
Start: 2021-07-19 | End: 2021-08-16 | Stop reason: SDUPTHER

## 2021-08-16 DIAGNOSIS — G47.09 OTHER INSOMNIA: ICD-10-CM

## 2021-08-18 RX ORDER — ZOLPIDEM TARTRATE 5 MG/1
5 TABLET ORAL
Qty: 30 TABLET | Refills: 0 | Status: SHIPPED | OUTPATIENT
Start: 2021-08-18 | End: 2021-09-20 | Stop reason: SDUPTHER

## 2021-08-23 DIAGNOSIS — F41.9 ANXIETY: ICD-10-CM

## 2021-08-23 RX ORDER — CITALOPRAM 40 MG/1
40 TABLET ORAL DAILY
Qty: 90 TABLET | Refills: 1 | Status: SHIPPED | OUTPATIENT
Start: 2021-08-23 | End: 2022-02-21 | Stop reason: SDUPTHER

## 2021-09-01 ENCOUNTER — OFFICE VISIT (OUTPATIENT)
Dept: INTERNAL MEDICINE CLINIC | Facility: CLINIC | Age: 61
End: 2021-09-01
Payer: COMMERCIAL

## 2021-09-01 VITALS
OXYGEN SATURATION: 98 % | RESPIRATION RATE: 18 BRPM | SYSTOLIC BLOOD PRESSURE: 118 MMHG | TEMPERATURE: 98.2 F | HEIGHT: 61 IN | HEART RATE: 81 BPM | DIASTOLIC BLOOD PRESSURE: 72 MMHG | WEIGHT: 106.6 LBS | BODY MASS INDEX: 20.12 KG/M2

## 2021-09-01 DIAGNOSIS — L03.115 CELLULITIS OF RIGHT LOWER EXTREMITY: Primary | ICD-10-CM

## 2021-09-01 DIAGNOSIS — L23.7 CONTACT DERMATITIS DUE TO POISON IVY: ICD-10-CM

## 2021-09-01 PROCEDURE — 1036F TOBACCO NON-USER: CPT | Performed by: NURSE PRACTITIONER

## 2021-09-01 PROCEDURE — 3008F BODY MASS INDEX DOCD: CPT | Performed by: NURSE PRACTITIONER

## 2021-09-01 PROCEDURE — 99213 OFFICE O/P EST LOW 20 MIN: CPT | Performed by: NURSE PRACTITIONER

## 2021-09-01 RX ORDER — CEPHALEXIN 500 MG/1
500 CAPSULE ORAL EVERY 6 HOURS SCHEDULED
Qty: 28 CAPSULE | Refills: 0 | Status: SHIPPED | OUTPATIENT
Start: 2021-09-01 | End: 2021-09-08

## 2021-09-01 RX ORDER — PREDNISONE 20 MG/1
40 TABLET ORAL DAILY
Qty: 10 TABLET | Refills: 0 | Status: SHIPPED | OUTPATIENT
Start: 2021-09-01 | End: 2021-09-06

## 2021-09-01 NOTE — PROGRESS NOTES
Assessment/Plan:    Contact dermatitis due to poison ivy  Will start prednisone  May use cool compresses  Cellulitis of right lower extremity  Will start Keflex  Follow up in one week  Diagnoses and all orders for this visit:    Cellulitis of right lower extremity  -     predniSONE 20 mg tablet; Take 2 tablets (40 mg total) by mouth daily for 5 days  -     cephalexin (KEFLEX) 500 mg capsule; Take 1 capsule (500 mg total) by mouth every 6 (six) hours for 7 days    Contact dermatitis due to poison ivy          Subjective:      Patient ID: Mert Stubbs is a 63 y o  female  Patient presents today for concerns for right leg discomfort  She reports that she was working out side and developed an itchy rash to her right lower extremity  She states that then she developed warmth to her lower extremity and redness around the rash which was scratched open  She reports weeping from the rash  Denies pain or injury  Denies fever and chill  Noted tenderness to touch  Has been going on since Sunday  Did notice some swelling around her knee  The following portions of the patient's history were reviewed and updated as appropriate: allergies, current medications, past family history, past medical history, past social history, past surgical history and problem list     Review of Systems   Constitutional: Negative for activity change, appetite change, chills, diaphoresis and fever  HENT: Negative for congestion, ear discharge, ear pain, postnasal drip, rhinorrhea, sinus pressure, sinus pain and sore throat  Eyes: Negative for pain, discharge, itching and visual disturbance  Respiratory: Negative for cough, chest tightness, shortness of breath and wheezing  Cardiovascular: Negative for chest pain, palpitations and leg swelling  Gastrointestinal: Negative for abdominal pain, constipation, diarrhea, nausea and vomiting  Endocrine: Negative for polydipsia, polyphagia and polyuria  Genitourinary: Negative for difficulty urinating, dysuria and urgency  Musculoskeletal: Negative for arthralgias, back pain and neck pain  Skin: Positive for rash  Negative for wound  Neurological: Negative for dizziness, weakness, numbness and headaches           Past Medical History:   Diagnosis Date    Anxiety     Arthritis     Last Assessed:  4/17/15    Herniated disc, cervical     Lipids abnormal     Last Assessed:  8/5/16    Spinal stenosis          Current Outpatient Medications:     busPIRone (BUSPAR) 15 mg tablet, Take 1 tablet (15 mg total) by mouth 2 (two) times a day, Disp: 180 tablet, Rfl: 1    citalopram (CeleXA) 40 mg tablet, Take 1 tablet (40 mg total) by mouth daily, Disp: 90 tablet, Rfl: 1    fluticasone (FLONASE) 50 mcg/act nasal spray, 2 sprays into each nostril daily (Patient taking differently: into each nostril as needed ), Disp: 1 Bottle, Rfl: 11    loratadine (CLARITIN) 10 mg tablet, Take 10 mg by mouth as needed , Disp: , Rfl:     PreviDent 5000 Sensitive 1 1-5 % PSTE, As directed, Disp: , Rfl:     Sulfacetamide Sodium, Acne, 10 % LOTN, APPLY TOPICALLY TO FACE EVERY MORNING, Disp: , Rfl:     tretinoin (REFISSA) 0 05 % cream, APPLY PEA SIZED AMOUNT TO ENTIRE FACE EVERY NIGHT AT BEDTIME 1 HOUR AFTER WASHING FACE, Disp: , Rfl:     zolpidem (AMBIEN) 5 mg tablet, Take 1 tablet (5 mg total) by mouth daily at bedtime as needed for sleep (Patient taking differently: Take 5 mg by mouth daily ), Disp: 30 tablet, Rfl: 0    ALPRAZolam (XANAX) 0 25 mg tablet, Take 0 5 tablets (0 125 mg total) by mouth 2 (two) times a day as needed for anxiety (Patient not taking: Reported on 7/9/2021), Disp: 30 tablet, Rfl: 0    cephalexin (KEFLEX) 500 mg capsule, Take 1 capsule (500 mg total) by mouth every 6 (six) hours for 7 days, Disp: 28 capsule, Rfl: 0    estradiol (ESTRACE) 0 1 mg/g vaginal cream, Insert 1 g into the vagina daily for 14 days, THEN 1 g 2 (two) times a week , Disp: 42 5 g, Rfl: 5    predniSONE 20 mg tablet, Take 2 tablets (40 mg total) by mouth daily for 5 days, Disp: 10 tablet, Rfl: 0    Allergies   Allergen Reactions    Ampicillin Other (See Comments)     RASH    Epinephrine     Erythromycin Other (See Comments)    Iv Dye [Iodinated Diagnostic Agents]     Levofloxacin     Penicillins     Procaine      Annotation - [id number]: When used with Epi    Moxifloxacin Rash     Swelling in lymphnodes       Social History   Past Surgical History:   Procedure Laterality Date     SECTION      EPIDURAL BLOCK INJECTION      back    EXPLORATORY LAPAROTOMY      HERNIA REPAIR      Last Assessed:  4/17/15    HYSTERECTOMY      Partial    ROTATOR CUFF REPAIR      Last Assessed:  4/17/15    SEPTOPLASTY       Family History   Problem Relation Age of Onset    Lupus Mother     Other Mother         Rheumatic disease    Diabetes Mother     Lung cancer Father 77        smoker    Blindness Brother     Heart attack Brother     Stroke Brother     Prostate cancer Brother     No Known Problems Maternal Grandmother     No Known Problems Maternal Grandfather     No Known Problems Paternal Grandmother     No Known Problems Paternal Grandfather     No Known Problems Son     No Known Problems Son        Objective:  /72 (BP Location: Right arm, Patient Position: Sitting, Cuff Size: Standard)   Pulse 81   Temp 98 2 °F (36 8 °C) (Temporal)   Resp 18   Ht 5' 1" (1 549 m)   Wt 48 4 kg (106 lb 9 6 oz)   LMP  (LMP Unknown)   SpO2 98%   BMI 20 14 kg/m²     No results found for this or any previous visit (from the past 1344 hour(s))  Physical Exam  Constitutional:       General: She is not in acute distress  Appearance: She is well-developed  She is not diaphoretic  HENT:      Head: Normocephalic and atraumatic        Right Ear: External ear normal       Left Ear: External ear normal       Nose: Nose normal       Mouth/Throat:      Pharynx: No oropharyngeal exudate  Eyes:      General:         Right eye: No discharge  Left eye: No discharge  Conjunctiva/sclera: Conjunctivae normal       Pupils: Pupils are equal, round, and reactive to light  Neck:      Thyroid: No thyromegaly  Cardiovascular:      Rate and Rhythm: Normal rate and regular rhythm  Heart sounds: Normal heart sounds  No murmur heard  No friction rub  No gallop  Pulmonary:      Effort: Pulmonary effort is normal  No respiratory distress  Breath sounds: Normal breath sounds  No stridor  No wheezing or rales  Abdominal:      General: Bowel sounds are normal  There is no distension  Palpations: Abdomen is soft  Tenderness: There is no abdominal tenderness  Musculoskeletal:      Cervical back: Normal range of motion and neck supple  Lymphadenopathy:      Cervical: No cervical adenopathy  Skin:     General: Skin is warm and dry  Findings: No erythema or rash  Neurological:      Mental Status: She is alert and oriented to person, place, and time  Psychiatric:         Behavior: Behavior normal          Thought Content:  Thought content normal          Judgment: Judgment normal

## 2021-09-08 ENCOUNTER — OFFICE VISIT (OUTPATIENT)
Dept: INTERNAL MEDICINE CLINIC | Facility: CLINIC | Age: 61
End: 2021-09-08
Payer: COMMERCIAL

## 2021-09-08 VITALS
HEIGHT: 61 IN | SYSTOLIC BLOOD PRESSURE: 124 MMHG | HEART RATE: 82 BPM | TEMPERATURE: 98.3 F | DIASTOLIC BLOOD PRESSURE: 78 MMHG | OXYGEN SATURATION: 99 % | BODY MASS INDEX: 19.63 KG/M2 | WEIGHT: 104 LBS

## 2021-09-08 DIAGNOSIS — E78.89 LIPIDS ABNORMAL: Primary | ICD-10-CM

## 2021-09-08 DIAGNOSIS — F41.9 ANXIETY: ICD-10-CM

## 2021-09-08 DIAGNOSIS — G47.09 OTHER INSOMNIA: ICD-10-CM

## 2021-09-08 DIAGNOSIS — F41.1 GENERALIZED ANXIETY DISORDER: ICD-10-CM

## 2021-09-08 PROBLEM — L23.7 CONTACT DERMATITIS DUE TO POISON IVY: Status: RESOLVED | Noted: 2021-09-01 | Resolved: 2021-09-08

## 2021-09-08 PROBLEM — L03.115 CELLULITIS OF RIGHT LOWER EXTREMITY: Status: RESOLVED | Noted: 2021-09-01 | Resolved: 2021-09-08

## 2021-09-08 PROCEDURE — 99214 OFFICE O/P EST MOD 30 MIN: CPT | Performed by: NURSE PRACTITIONER

## 2021-09-08 PROCEDURE — 3008F BODY MASS INDEX DOCD: CPT | Performed by: NURSE PRACTITIONER

## 2021-09-08 PROCEDURE — 1036F TOBACCO NON-USER: CPT | Performed by: NURSE PRACTITIONER

## 2021-09-08 NOTE — PROGRESS NOTES
Assessment/Plan:    Contact dermatitis due to poison ivy  Resolved  Anxiety  Controlled  Patient will continue on Celexa 40mg,  Patient uses Xanax very sparingly  Continue with BuSpar to 15 mg tablet b i d  Follow up in 6 months or sooner  Lipids abnormal  Will get updated lipid panel and fasting blood work  Recommend healthy lifestyle choices for your cholesterol  Low fat/low cholesterol diet  Limit/avoid red meat  Eat more lean meat - chicken breast, ground turkey, fish  Exercise 30 mins at least 5 times a week as tolerated  Other insomnia  Controlled with Ambien  Diagnoses and all orders for this visit:    Lipids abnormal    Anxiety    Generalized anxiety disorder    Other insomnia          Subjective:      Patient ID: Delisa Evans is a 63 y o  female  Patient presents today to follow-up on anxiety, and insomnia  Patient also presents today to follow up on contact dermatitis and cellulitis, which has no resolved  Patient reports no new concerns  Anxiety- currently well controlled on Celexa and buspar  Xanax as needed has been used sparingly  Insomnia- Ambien as needed        Anxiety  Presents for follow-up visit  Symptoms include excessive worry, insomnia and nervous/anxious behavior  Patient reports no chest pain, dizziness, nausea, palpitations, shortness of breath or suicidal ideas  Symptoms occur rarely  The severity of symptoms is mild  The patient sleeps 4 hours per night  The quality of sleep is non-restorative  Compliance with medications is %  The following portions of the patient's history were reviewed and updated as appropriate: allergies, current medications, past family history, past medical history, past social history, past surgical history and problem list     Review of Systems   Constitutional: Negative for activity change, appetite change, chills, diaphoresis and fever     HENT: Negative for congestion, ear discharge, ear pain, postnasal drip, rhinorrhea, sinus pressure, sinus pain and sore throat  Eyes: Negative for pain, discharge, itching and visual disturbance  Respiratory: Negative for cough, chest tightness, shortness of breath and wheezing  Cardiovascular: Negative for chest pain, palpitations and leg swelling  Gastrointestinal: Negative for abdominal pain, constipation, diarrhea, nausea and vomiting  Endocrine: Negative for polydipsia, polyphagia and polyuria  Genitourinary: Negative for difficulty urinating, dysuria and urgency  Musculoskeletal: Negative for arthralgias, back pain and neck pain  Skin: Negative for rash and wound  Neurological: Negative for dizziness, weakness, numbness and headaches  Psychiatric/Behavioral: Negative for suicidal ideas  The patient is nervous/anxious and has insomnia            Past Medical History:   Diagnosis Date    Anxiety     Arthritis     Last Assessed:  4/17/15    Herniated disc, cervical     Lipids abnormal     Last Assessed:  8/5/16    Spinal stenosis          Current Outpatient Medications:     ALPRAZolam (XANAX) 0 25 mg tablet, Take 0 5 tablets (0 125 mg total) by mouth 2 (two) times a day as needed for anxiety, Disp: 30 tablet, Rfl: 0    busPIRone (BUSPAR) 15 mg tablet, Take 1 tablet (15 mg total) by mouth 2 (two) times a day, Disp: 180 tablet, Rfl: 1    citalopram (CeleXA) 40 mg tablet, Take 1 tablet (40 mg total) by mouth daily, Disp: 90 tablet, Rfl: 1    estradiol (ESTRACE) 0 1 mg/g vaginal cream, Insert 1 g into the vagina daily for 14 days, THEN 1 g 2 (two) times a week , Disp: 42 5 g, Rfl: 5    fluticasone (FLONASE) 50 mcg/act nasal spray, 2 sprays into each nostril daily (Patient taking differently: into each nostril as needed ), Disp: 1 Bottle, Rfl: 11    loratadine (CLARITIN) 10 mg tablet, Take 10 mg by mouth as needed , Disp: , Rfl:     PreviDent 5000 Sensitive 1 1-5 % PSTE, As directed, Disp: , Rfl:     Sulfacetamide Sodium, Acne, 10 % LOTN, APPLY TOPICALLY TO FACE EVERY MORNING, Disp: , Rfl:     tretinoin (REFISSA) 0 05 % cream, APPLY PEA SIZED AMOUNT TO ENTIRE FACE EVERY NIGHT AT BEDTIME 1 HOUR AFTER WASHING FACE, Disp: , Rfl:     zolpidem (AMBIEN) 5 mg tablet, Take 1 tablet (5 mg total) by mouth daily at bedtime as needed for sleep (Patient taking differently: Take 5 mg by mouth daily ), Disp: 30 tablet, Rfl: 0    cephalexin (KEFLEX) 500 mg capsule, Take 1 capsule (500 mg total) by mouth every 6 (six) hours for 7 days (Patient not taking: Reported on 2021), Disp: 28 capsule, Rfl: 0    Allergies   Allergen Reactions    Ampicillin Other (See Comments)     RASH    Epinephrine     Erythromycin Other (See Comments)    Iv Dye [Iodinated Diagnostic Agents]     Levofloxacin     Penicillins     Procaine      Annotation - [id number]: When used with Epi    Moxifloxacin Rash     Swelling in lymphnodes       Social History   Past Surgical History:   Procedure Laterality Date     SECTION      EPIDURAL BLOCK INJECTION      back    EXPLORATORY LAPAROTOMY      HERNIA REPAIR      Last Assessed:  4/17/15    HYSTERECTOMY      Partial    ROTATOR CUFF REPAIR      Last Assessed:  4/17/15    SEPTOPLASTY       Family History   Problem Relation Age of Onset    Lupus Mother     Other Mother         Rheumatic disease    Diabetes Mother     Lung cancer Father 77        smoker    Blindness Brother     Heart attack Brother     Stroke Brother     Prostate cancer Brother     No Known Problems Maternal Grandmother     No Known Problems Maternal Grandfather     No Known Problems Paternal Grandmother     No Known Problems Paternal Grandfather     No Known Problems Son     No Known Problems Son        Objective:  /78 (BP Location: Left arm, Patient Position: Sitting, Cuff Size: Adult)   Pulse 82   Temp 98 3 °F (36 8 °C) (Oral)   Ht 5' 1" (1 549 m)   Wt 47 2 kg (104 lb)   LMP  (LMP Unknown)   SpO2 99%   BMI 19 65 kg/m²     No results found for this or any previous visit (from the past 1344 hour(s))  Physical Exam  Constitutional:       General: She is not in acute distress  Appearance: She is well-developed  She is not diaphoretic  HENT:      Head: Normocephalic and atraumatic  Right Ear: External ear normal       Left Ear: External ear normal       Nose: Nose normal       Mouth/Throat:      Pharynx: No oropharyngeal exudate  Eyes:      General:         Right eye: No discharge  Left eye: No discharge  Conjunctiva/sclera: Conjunctivae normal       Pupils: Pupils are equal, round, and reactive to light  Neck:      Thyroid: No thyromegaly  Cardiovascular:      Rate and Rhythm: Normal rate and regular rhythm  Heart sounds: Normal heart sounds  No murmur heard  No friction rub  No gallop  Pulmonary:      Effort: Pulmonary effort is normal  No respiratory distress  Breath sounds: Normal breath sounds  No stridor  No wheezing or rales  Abdominal:      General: Bowel sounds are normal  There is no distension  Palpations: Abdomen is soft  Tenderness: There is no abdominal tenderness  Musculoskeletal:      Cervical back: Normal range of motion and neck supple  Lymphadenopathy:      Cervical: No cervical adenopathy  Skin:     General: Skin is warm and dry  Findings: No erythema or rash  Neurological:      Mental Status: She is alert and oriented to person, place, and time  Psychiatric:         Behavior: Behavior normal          Thought Content:  Thought content normal          Judgment: Judgment normal

## 2021-09-08 NOTE — ASSESSMENT & PLAN NOTE
Controlled  Patient will continue on Celexa 40mg,  Patient uses Xanax very sparingly  Continue with BuSpar to 15 mg tablet b i d  Follow up in 6 months or sooner

## 2021-09-20 DIAGNOSIS — G47.09 OTHER INSOMNIA: ICD-10-CM

## 2021-09-20 RX ORDER — ZOLPIDEM TARTRATE 5 MG/1
5 TABLET ORAL DAILY
Qty: 30 TABLET | Refills: 0 | Status: SHIPPED | OUTPATIENT
Start: 2021-09-20 | End: 2021-10-19 | Stop reason: SDUPTHER

## 2021-09-27 DIAGNOSIS — G47.09 OTHER INSOMNIA: ICD-10-CM

## 2021-09-27 DIAGNOSIS — F41.1 GENERALIZED ANXIETY DISORDER: ICD-10-CM

## 2021-09-27 RX ORDER — BUSPIRONE HYDROCHLORIDE 15 MG/1
15 TABLET ORAL 2 TIMES DAILY
Qty: 180 TABLET | Refills: 1 | Status: SHIPPED | OUTPATIENT
Start: 2021-09-27 | End: 2022-03-09 | Stop reason: SDUPTHER

## 2021-10-19 DIAGNOSIS — G47.09 OTHER INSOMNIA: ICD-10-CM

## 2021-10-19 RX ORDER — ZOLPIDEM TARTRATE 5 MG/1
5 TABLET ORAL DAILY
Qty: 30 TABLET | Refills: 0 | Status: SHIPPED | OUTPATIENT
Start: 2021-10-19 | End: 2021-11-19 | Stop reason: SDUPTHER

## 2021-11-19 DIAGNOSIS — G47.09 OTHER INSOMNIA: ICD-10-CM

## 2021-11-19 RX ORDER — ZOLPIDEM TARTRATE 5 MG/1
5 TABLET ORAL DAILY
Qty: 30 TABLET | Refills: 0 | Status: SHIPPED | OUTPATIENT
Start: 2021-11-19 | End: 2021-12-22 | Stop reason: SDUPTHER

## 2021-12-22 DIAGNOSIS — G47.09 OTHER INSOMNIA: ICD-10-CM

## 2021-12-22 RX ORDER — ZOLPIDEM TARTRATE 5 MG/1
5 TABLET ORAL DAILY
Qty: 30 TABLET | Refills: 0 | Status: SHIPPED | OUTPATIENT
Start: 2021-12-22 | End: 2022-01-17 | Stop reason: SDUPTHER

## 2022-01-12 ENCOUNTER — TELEPHONE (OUTPATIENT)
Dept: INTERNAL MEDICINE CLINIC | Facility: OTHER | Age: 62
End: 2022-01-12

## 2022-01-12 NOTE — TELEPHONE ENCOUNTER
Patient has pre op scheduled on Monday with Rosi we will review at that time  Does she have any specific questions, that I can answer to ease her mind?    I did review the blood work nothing appeared to be urgent at this time

## 2022-01-12 NOTE — TELEPHONE ENCOUNTER
Patient calling because she was concerned about some of her lab results that she just recently had done  She would like if someone could give her a call and review them with her

## 2022-01-14 ENCOUNTER — RA CDI HCC (OUTPATIENT)
Dept: OTHER | Facility: HOSPITAL | Age: 62
End: 2022-01-14

## 2022-01-14 NOTE — PROGRESS NOTES
[facility]  coding opportunities       Chart reviewed, no opportunity found: CHART REVIEWED, NO OPPORTUNITY FOUND                        Patients insurance company: [employer] (Medicare Advantage and Commercial)

## 2022-01-17 ENCOUNTER — OFFICE VISIT (OUTPATIENT)
Dept: INTERNAL MEDICINE CLINIC | Facility: OTHER | Age: 62
End: 2022-01-17
Payer: COMMERCIAL

## 2022-01-17 VITALS
BODY MASS INDEX: 18.77 KG/M2 | TEMPERATURE: 97.7 F | DIASTOLIC BLOOD PRESSURE: 70 MMHG | WEIGHT: 99.4 LBS | HEART RATE: 80 BPM | SYSTOLIC BLOOD PRESSURE: 124 MMHG | HEIGHT: 61 IN | OXYGEN SATURATION: 98 %

## 2022-01-17 DIAGNOSIS — R09.82 POSTNASAL DRIP: ICD-10-CM

## 2022-01-17 DIAGNOSIS — Z01.818 PREOPERATIVE CLEARANCE: Primary | ICD-10-CM

## 2022-01-17 DIAGNOSIS — F41.9 ANXIETY: ICD-10-CM

## 2022-01-17 DIAGNOSIS — G47.09 OTHER INSOMNIA: ICD-10-CM

## 2022-01-17 DIAGNOSIS — F51.01 PRIMARY INSOMNIA: ICD-10-CM

## 2022-01-17 PROBLEM — M25.519 SHOULDER JOINT PAIN: Status: RESOLVED | Noted: 2018-07-12 | Resolved: 2022-01-17

## 2022-01-17 PROBLEM — R23.2 HOT FLASHES: Status: RESOLVED | Noted: 2017-05-08 | Resolved: 2022-01-17

## 2022-01-17 PROBLEM — S46.819A STRAIN OF SUPRASPINATUS MUSCLE OR TENDON: Status: RESOLVED | Noted: 2018-07-12 | Resolved: 2022-01-17

## 2022-01-17 PROCEDURE — 3008F BODY MASS INDEX DOCD: CPT | Performed by: NURSE PRACTITIONER

## 2022-01-17 PROCEDURE — 99243 OFF/OP CNSLTJ NEW/EST LOW 30: CPT | Performed by: NURSE PRACTITIONER

## 2022-01-17 PROCEDURE — 3725F SCREEN DEPRESSION PERFORMED: CPT | Performed by: NURSE PRACTITIONER

## 2022-01-17 PROCEDURE — 1036F TOBACCO NON-USER: CPT | Performed by: NURSE PRACTITIONER

## 2022-01-17 PROCEDURE — 93000 ELECTROCARDIOGRAM COMPLETE: CPT | Performed by: NURSE PRACTITIONER

## 2022-01-17 RX ORDER — ZOLPIDEM TARTRATE 5 MG/1
5 TABLET ORAL DAILY
Qty: 30 TABLET | Refills: 0 | Status: SHIPPED | OUTPATIENT
Start: 2022-01-17

## 2022-01-17 NOTE — PATIENT INSTRUCTIONS
Weight Gain Tips for Athletes   AMBULATORY CARE:   Why some athletes need to gain weight:  Some athletes need more calories to gain weight or maintain their weight  For example, you may need to maintain your weight for endurance sports because of the large amount of calories you burn  Endurance sports include running, swimming, and biking over long time periods or distances  Weight gain may also help to build muscle  This may help you if you participate in contact sports, such as football and hockey  A healthy weight gain goal  is about ½ to 1 pound each week  Gain weight slowly to avoid gaining too much body fat  An exercise program that includes strength training will help you gain muscle weight  Ask your dietitian how much weight gain is right for you  A healthy meal plan for an athlete  includes a variety of healthy foods during regular meals and snacks  The following are suggested amounts of fat, carbohydrate, and protein you may need each day  Your dietitian can tell you how many calories you need each day to gain weight  · Fat  is important because it provides energy and vitamins  You need 20% to 35% of your total daily calories to come from fat  For example, a man who needs about 2900 calories per day would need 725 fat calories each day  There are both healthy fats and unhealthy fats in foods  Ask your healthcare provider for more information about different types of fat and the total amount of fat you should have  · Carbohydrate  is the main source of energy your body uses during exercise  The amount you need depends on your daily calorie needs and the sport that you do  It also depends on whether you are male or female  Athletes need 6 to 10 grams of carbohydrates for each kilogram of body weight  To find your weight in kilograms, divide your weight in pounds by 2 2  Then multiply this number by your carbohydrate needs   For example, if you weigh 70 kilograms, you would need 420 to 700 grams of carbohydrate each day  · Protein  helps to build and repair muscle, produce hormones, boost your immune system, and replace blood cells  The amount of protein you need is only slightly higher than the amount suggested for people who do not exercise  Endurance athletes need 1 2 to 1 4 grams for each kilogram of body weight per day  Athletes who do strength training (such as lifting weights) need 1 2 to 1 7 grams for each kilogram of body weight per day  People can usually meet their needs for protein by following a balanced meal plan  Good sources of protein are lean meats, poultry, eggs, milk, cheese, peanut butter, and beans  Protein or amino acid supplements are not needed for weight gain if you follow a healthy and balanced meal plan  How to increase calories:  You need to eat or drink 500 to 1000 extra calories each day to gain about ½ to 1 pound each week  Your dietitian can tell you how many calories you need each day to gain weight  The following are some ways to add extra calories to your diet:  · Eat every 2 to 3 hours and 30 minutes after you exercise  · Include whole-grain carbohydrates and a lean protein food in each of your meals and snacks  Examples of whole-grain carbohydrates include whole-wheat bread, rolls, and bagels  Examples of lean protein include chicken and turkey  · Add high-calorie foods to your meals  Examples include cheese, peanut butter, avocado, nuts, and granola  · Carry healthy snacks with you  Examples of snacks that provide about 500 calories:    ? 8 saltine crackers, 1 ounce of cheese, and 1 cup of ice cream    ? 1 cup of dry cereal with 1 cup of whole milk, 1 banana, and 1 slice of toast with 1 tablespoon of peanut butter    ?  6 holden cracker squares with 2 tablespoons of peanut butter, 2 tablespoons of raisins, and 1 cup of orange juice    Healthy foods that are higher in calories:   · Choose whole-grain breads, such as honey bran, rye, and pumpernickel instead of white bread  Add peanut butter, margarine, jam, or honey for extra calories  · Eat high-calorie cereals, such as granola and cereals that contain nuts  These are healthy choices and have more calories per serving than puffed rice or corn flakes  The serving size of a cereal is listed on the food label  You can also add more calories to cereals by adding nuts, raisins, and other fruits  · Bananas, pineapple, mangos, raisins, dates, and dried fruit have more calories per serving than watery fruits  Some examples of watery fruits are watermelon, grapefruit, apples, and peaches  Trail mix is a good choice because it contains dried fruits and nuts  · Add margarine, almonds, and cheese to vegetables for extra calories  Stir-frying vegetables with canola or olive oil will also add extra calories  · Cook chicken or fish in a small amount of canola or olive oil  Red meats, such as beef, pork, and lamb, have more calories, but they also have more saturated fat  Saturated fat is an unhealthy type of fat because it may increase blood cholesterol  When you eat red meats, choose leaner cuts  Some examples of lean cuts of red meat are round or sirloin steak, ground round, fresh or boiled ham, or center loin chop  Liquids you should drink:   · You can add calories to your diet by drinking juice, milk, milkshakes, and instant breakfast drinks  Drink plenty of water to prevent dehydration  Dehydration can cause serious health problems  Athletes have higher liquid needs because they lose water through sweat  · Always carry water with you during long exercise sessions  You can wear a special bag or belt made to carry water on your back or around your waist  Drink sports drinks during exercise sessions that last longer than 1 hour  The best way to check if you are drinking enough liquids is to check the color of your urine  Urine should be clear or very light yellow, with little or no smell   If your urine is dark or smells strong, you may not be drinking enough  Follow up with your doctor as directed:  Write down your questions so you remember to ask them during your visits  © Copyright [employer] 2021 Information is for End User's use only and may not be sold, redistributed or otherwise used for commercial purposes  All illustrations and images included in CareNotes® are the copyrighted property of A D A M , Inc  or [facility] Kristie Prasad   The above information is an  only  It is not intended as medical advice for individual conditions or treatments  Talk to your doctor, nurse or pharmacist before following any medical regimen to see if it is safe and effective for you

## 2022-01-17 NOTE — PROGRESS NOTES
Assessment/Plan:    Preoperative clearance  EKG=reviewed and normal  No evidence of CAD   Active able to complete >4 METS  Cleared for surgery from medicine standpoint    Primary insomnia  Reorder ambien         Problem List Items Addressed This Visit     None      Visit Diagnoses     Postnasal drip                Subjective:      Patient ID: Nelida Faustin is a 63 y o  female  Pt undergoing eyelid surgery, denies any chest pain or chest pressure  She's very active and is cleared to undergo surgery from a medicine standpoint  She has ongoing insomnia and takes ambien nightly  She is requesting a refill  That was provided today      The following portions of the patient's history were reviewed and updated as appropriate: allergies, current medications, past family history, past medical history, past social history, past surgical history and problem list     Review of Systems   Constitutional: Negative for chills and fever  HENT: Negative for ear pain and sore throat  Eyes: Negative for pain and visual disturbance  Respiratory: Negative for cough and shortness of breath  Cardiovascular: Negative for chest pain and palpitations  Gastrointestinal: Negative for abdominal pain and vomiting  Genitourinary: Negative for dysuria and hematuria  Musculoskeletal: Negative for arthralgias and back pain  Skin: Negative for color change and rash  Neurological: Negative for seizures and syncope  All other systems reviewed and are negative  Objective:      /70 (BP Location: Left arm, Patient Position: Sitting, Cuff Size: Standard)   Pulse 80   Temp 97 7 °F (36 5 °C) (Temporal)   Ht 5' 1" (1 549 m)   Wt 45 1 kg (99 lb 6 4 oz)   LMP  (LMP Unknown)   SpO2 98%   BMI 18 78 kg/m²          Physical Exam  Vitals and nursing note reviewed  Constitutional:       Appearance: Normal appearance  HENT:      Head: Normocephalic        Nose: Nose normal       Mouth/Throat:      Mouth: Mucous membranes are dry  Eyes:      Extraocular Movements: Extraocular movements intact  Conjunctiva/sclera: Conjunctivae normal       Pupils: Pupils are equal, round, and reactive to light  Cardiovascular:      Rate and Rhythm: Normal rate and regular rhythm  Pulses: Normal pulses  Heart sounds: Normal heart sounds  Pulmonary:      Effort: Pulmonary effort is normal    Abdominal:      General: Abdomen is flat  Bowel sounds are normal       Palpations: Abdomen is soft  Musculoskeletal:         General: Normal range of motion  Cervical back: Normal range of motion  Skin:     General: Skin is warm and dry  Neurological:      General: No focal deficit present  Mental Status: She is alert and oriented to person, place, and time     Psychiatric:         Mood and Affect: Mood normal

## 2022-01-17 NOTE — ASSESSMENT & PLAN NOTE
EKG=reviewed and normal  No evidence of CAD   Active able to complete >4 METS  Cleared for surgery from medicine standpoint

## 2022-02-03 NOTE — PERIOPERATIVE NURSING NOTE
Completed PAT appt with pt- pt did report having two areas of abrasion on her face - using ABX ointment to areas -instructed pt to check in with Dr Moraima Stratton office prior to surgery regarding the healing of those areas on face since surgery is bilateral blepharoplasty  Pt verbalized understanding of these instructions

## 2022-02-03 NOTE — PRE-PROCEDURE INSTRUCTIONS
Pre-Surgery Instructions:   Medication Instructions    busPIRone (BUSPAR) 15 mg tablet Instructed to take per normal schedule including DOS with sips water    citalopram (CeleXA) 40 mg tablet Instructed to take per normal schedule including DOS with sips water    fluticasone (FLONASE) 50 mcg/act nasal spray Instructed to take per normal schedule including DOS, if needed    PreviDent 5000 Sensitive 1 1-5 % PSTE Can continue to use as prescribed    Sulfacetamide Sodium, Acne, 10 % LOTN Instructed to HOLD any creams for morning of surgery     tretinoin (REFISSA) 0 05 % cream Continue use and may use night before surgery - no creams DOS    zolpidem (AMBIEN) 5 mg tablet Take per normal schedule -take night before surgery if needed     Reviewed all medications and instructions for DOS  Reviewed all showering instructions and COVID visitation policy  Pt aware that [facility]  is location for DOS, instructed that pt pre op nurse will call on 2/10/22  to give specific instructions for DOS  Pt instructed to bring photo ID and insurance card for DOS, remove all jewelry and  NO valuables for DOS  Pt instructed to use only Tylenol 7 days prior to DOS, NO NSAID products  Pt informed transport is needed for DOS due to receiving anesthesia  Instructed patient to minimize alcohol use prior to surgery  No alcohol 24 hours prior to surgery to reduce risk of dehydration  Pt verbalized understanding of all instructions given and reviewed for DOS  Pt did report two abrasions on face- treating with ABX ointment at this time- did instruct pt to check in with Dr Stanislaw Chester office prior to surgery regarding healing of these abrasions due to surgery being bilateral blepharoplasty  My Surgical Experience    The following information was developed to assist you to prepare for your operation      What do I need to do before coming to the hospital?   Arrange for a responsible person to drive you to and from the hospital   Victoriano Needs Arrange care for your children at home  Children are not allowed in the recovery areas of the hospital   Plan to wear clothing that is easy to put on and take off  If you are having shoulder surgery, wear a shirt that buttons or zippers in the front  Bathing  o Shower the evening before and the morning of your surgery with an antibacterial soap  Please refer to the Pre Op Showering Instructions for Surgery Patients Sheet   o Remove nail polish and all body piercing jewelry  o Do not shave any body part for at least 24 hours before surgery-this includes face, arms, legs and upper body  Food  o Nothing to eat or drink after midnight the night before your surgery  This includes candy and chewing gum  o Exception: If your surgery is after 12:00pm (noon), you may have clear liquids such as 7-Up®, ginger ale, apple or cranberry juice, Jell-O®, water, or clear broth until 8:00 am  o Do not drink milk or juice with pulp on the morning before surgery  o Do not drink alcohol 24 hours before surgery  Medicine  o Follow instructions you received from your surgeon about which medicines you may take on the day of surgery  o If instructed to take medicine on the morning of surgery, take pills with just a small sip of water  Call your prescribing doctor for specific infroamtion on what to do if you take insulin    What should I bring to the hospital?    Bring:  Irene Salazar or a walker, if you have them, for foot or knee surgery   A list of the daily medicines, vitamins, minerals, herbals and nutritional supplements you take   Include the dosages of medicines and the time you take them each day   Glasses, dentures or hearing aids   Minimal clothing; you will be wearing hospital sleepwear   Photo ID; required to verify your identity   If you have a Living Will or Power of , bring a copy of the documents   If you have an ostomy, bring an extra pouch and any supplies you use    Do not bring   Medicines or inhalers  Comfort valuables or jewelry    What other information should I know about the day of surgery?  Notify your surgeons if you develop a cold, sore throat, cough, fever, rash or any other illness   Report to the Ambulatory Surgical/Same Day Surgery Unit   You will be instructed to stop at Registration only if you have not been pre-registered   Inform your  fi they do not stay that they will be asked by the staff to leave a phone number where they can be reached   Be available to be reached before surgery  In the event the operating room schedule changes, you may be asked to come in earlier or later than expected    *It is important to tell your doctor and others involved in your health care if you are taking or have been taking any non-prescription drugs, vitamins, minerals, herbals or other nutritional supplements   Any of these may interact with some food or medicines and cause a reaction

## 2022-02-04 PROBLEM — H02.839 DERMATOCHALASIS: Status: ACTIVE | Noted: 2022-02-04

## 2022-02-10 ENCOUNTER — ANESTHESIA EVENT (OUTPATIENT)
Dept: PERIOP | Facility: HOSPITAL | Age: 62
End: 2022-02-10
Payer: SELF-PAY

## 2022-02-10 NOTE — ANESTHESIA PREPROCEDURE EVALUATION
Procedure:  BLEPHAROPLASTY LOWER (Bilateral Eye)    Relevant Problems   MUSCULOSKELETAL   (+) Cervical spondylosis without myelopathy   (+) Lumbosacral spondylosis without myelopathy      NEURO/PSYCH   (+) Anxiety      Other   (+) Cervical radiculopathy   (+) Dense breasts   (+) Dermatochalasis   (+) Lipids abnormal   (+) Neck pain   (+) Primary insomnia        Physical Exam    Airway    Mallampati score: II  TM Distance: >3 FB  Neck ROM: full     Dental   No notable dental hx     Cardiovascular  Cardiovascular exam normal    Pulmonary  Pulmonary exam normal     Other Findings        Anesthesia Plan  ASA Score- 2     Anesthesia Type- IV sedation with anesthesia with ASA Monitors  Additional Monitors:   Airway Plan:     Comment: No recent labs  Plan Factors-Exercise tolerance (METS): >4 METS  Chart reviewed  Patient is not a current smoker  Patient not instructed to abstain from smoking on day of procedure  Patient did not smoke on day of surgery  Induction- intravenous  Postoperative Plan-     Informed Consent- Anesthetic plan and risks discussed with patient  I personally reviewed this patient with the CRNA  Discussed and agreed on the Anesthesia Plan with the CRNA  Wang Enriquez

## 2022-02-11 ENCOUNTER — ANESTHESIA (OUTPATIENT)
Dept: PERIOP | Facility: HOSPITAL | Age: 62
End: 2022-02-11
Payer: SELF-PAY

## 2022-02-11 ENCOUNTER — HOSPITAL ENCOUNTER (OUTPATIENT)
Facility: HOSPITAL | Age: 62
Setting detail: OUTPATIENT SURGERY
Discharge: HOME | End: 2022-02-11
Attending: SURGERY | Admitting: SURGERY
Payer: SELF-PAY

## 2022-02-11 VITALS
DIASTOLIC BLOOD PRESSURE: 75 MMHG | HEART RATE: 68 BPM | SYSTOLIC BLOOD PRESSURE: 117 MMHG | WEIGHT: 99 LBS | TEMPERATURE: 97.4 F | HEIGHT: 61 IN | OXYGEN SATURATION: 100 % | BODY MASS INDEX: 18.69 KG/M2 | RESPIRATION RATE: 18 BRPM

## 2022-02-11 RX ORDER — LIDOCAINE HYDROCHLORIDE 10 MG/ML
INJECTION, SOLUTION EPIDURAL; INFILTRATION; INTRACAUDAL; PERINEURAL AS NEEDED
Status: DISCONTINUED | OUTPATIENT
Start: 2022-02-11 | End: 2022-02-11

## 2022-02-11 RX ORDER — FENTANYL CITRATE 50 UG/ML
INJECTION, SOLUTION INTRAMUSCULAR; INTRAVENOUS AS NEEDED
Status: DISCONTINUED | OUTPATIENT
Start: 2022-02-11 | End: 2022-02-11

## 2022-02-11 RX ORDER — HYDROMORPHONE HCL/PF 1 MG/ML
0.5 SYRINGE (ML) INJECTION
Status: DISCONTINUED | OUTPATIENT
Start: 2022-02-11 | End: 2022-02-11 | Stop reason: HOSPADM

## 2022-02-11 RX ORDER — DEXMEDETOMIDINE HYDROCHLORIDE 100 UG/ML
INJECTION, SOLUTION INTRAVENOUS AS NEEDED
Status: DISCONTINUED | OUTPATIENT
Start: 2022-02-11 | End: 2022-02-11

## 2022-02-11 RX ORDER — CEFAZOLIN SODIUM 1 G/50ML
1000 SOLUTION INTRAVENOUS ONCE
Status: COMPLETED | OUTPATIENT
Start: 2022-02-11 | End: 2022-02-11

## 2022-02-11 RX ORDER — GLYCOPYRROLATE 0.2 MG/ML
INJECTION INTRAMUSCULAR; INTRAVENOUS AS NEEDED
Status: DISCONTINUED | OUTPATIENT
Start: 2022-02-11 | End: 2022-02-11

## 2022-02-11 RX ORDER — PROPOFOL 10 MG/ML
INJECTION, EMULSION INTRAVENOUS CONTINUOUS PRN
Status: DISCONTINUED | OUTPATIENT
Start: 2022-02-11 | End: 2022-02-11

## 2022-02-11 RX ORDER — ONDANSETRON 2 MG/ML
4 INJECTION INTRAMUSCULAR; INTRAVENOUS ONCE AS NEEDED
Status: DISCONTINUED | OUTPATIENT
Start: 2022-02-11 | End: 2022-02-11 | Stop reason: HOSPADM

## 2022-02-11 RX ORDER — ONDANSETRON 2 MG/ML
INJECTION INTRAMUSCULAR; INTRAVENOUS AS NEEDED
Status: DISCONTINUED | OUTPATIENT
Start: 2022-02-11 | End: 2022-02-11

## 2022-02-11 RX ORDER — BALANCED SALT SOLUTION 6.4; .75; .48; .3; 3.9; 1.7 MG/ML; MG/ML; MG/ML; MG/ML; MG/ML; MG/ML
SOLUTION OPHTHALMIC AS NEEDED
Status: DISCONTINUED | OUTPATIENT
Start: 2022-02-11 | End: 2022-02-11 | Stop reason: HOSPADM

## 2022-02-11 RX ORDER — MIDAZOLAM HYDROCHLORIDE 2 MG/2ML
INJECTION, SOLUTION INTRAMUSCULAR; INTRAVENOUS AS NEEDED
Status: DISCONTINUED | OUTPATIENT
Start: 2022-02-11 | End: 2022-02-11

## 2022-02-11 RX ORDER — SODIUM CHLORIDE, SODIUM LACTATE, POTASSIUM CHLORIDE, CALCIUM CHLORIDE 600; 310; 30; 20 MG/100ML; MG/100ML; MG/100ML; MG/100ML
125 INJECTION, SOLUTION INTRAVENOUS CONTINUOUS
Status: DISCONTINUED | OUTPATIENT
Start: 2022-02-11 | End: 2022-02-11 | Stop reason: HOSPADM

## 2022-02-11 RX ORDER — NEOMYCIN SULFATE, POLYMYXIN B SULFATE, AND DEXAMETHASONE 3.5; 10000; 1 MG/G; [USP'U]/G; MG/G
OINTMENT OPHTHALMIC AS NEEDED
Status: DISCONTINUED | OUTPATIENT
Start: 2022-02-11 | End: 2022-02-11 | Stop reason: HOSPADM

## 2022-02-11 RX ORDER — FENTANYL CITRATE/PF 50 MCG/ML
25 SYRINGE (ML) INJECTION
Status: DISCONTINUED | OUTPATIENT
Start: 2022-02-11 | End: 2022-02-11 | Stop reason: HOSPADM

## 2022-02-11 RX ORDER — LIDOCAINE HYDROCHLORIDE AND EPINEPHRINE 10; 10 MG/ML; UG/ML
INJECTION, SOLUTION INFILTRATION; PERINEURAL AS NEEDED
Status: DISCONTINUED | OUTPATIENT
Start: 2022-02-11 | End: 2022-02-11 | Stop reason: HOSPADM

## 2022-02-11 RX ORDER — DEXAMETHASONE SODIUM PHOSPHATE 4 MG/ML
INJECTION, SOLUTION INTRA-ARTICULAR; INTRALESIONAL; INTRAMUSCULAR; INTRAVENOUS; SOFT TISSUE AS NEEDED
Status: DISCONTINUED | OUTPATIENT
Start: 2022-02-11 | End: 2022-02-11

## 2022-02-11 RX ORDER — MINERAL OIL AND PETROLATUM 150; 830 MG/G; MG/G
OINTMENT OPHTHALMIC AS NEEDED
Status: DISCONTINUED | OUTPATIENT
Start: 2022-02-11 | End: 2022-02-11 | Stop reason: HOSPADM

## 2022-02-11 RX ORDER — MAGNESIUM HYDROXIDE 1200 MG/15ML
LIQUID ORAL AS NEEDED
Status: DISCONTINUED | OUTPATIENT
Start: 2022-02-11 | End: 2022-02-11 | Stop reason: HOSPADM

## 2022-02-11 RX ORDER — HYDROCODONE BITARTRATE AND ACETAMINOPHEN 5; 325 MG/1; MG/1
1 TABLET ORAL EVERY 4 HOURS PRN
Status: DISCONTINUED | OUTPATIENT
Start: 2022-02-11 | End: 2022-02-11 | Stop reason: HOSPADM

## 2022-02-11 RX ORDER — PROPOFOL 10 MG/ML
INJECTION, EMULSION INTRAVENOUS AS NEEDED
Status: DISCONTINUED | OUTPATIENT
Start: 2022-02-11 | End: 2022-02-11

## 2022-02-11 RX ORDER — HYDROCODONE BITARTRATE AND ACETAMINOPHEN 5; 325 MG/1; MG/1
2 TABLET ORAL EVERY 4 HOURS PRN
Status: DISCONTINUED | OUTPATIENT
Start: 2022-02-11 | End: 2022-02-11 | Stop reason: HOSPADM

## 2022-02-11 RX ORDER — KETOROLAC TROMETHAMINE 30 MG/ML
15 INJECTION, SOLUTION INTRAMUSCULAR; INTRAVENOUS ONCE
Status: COMPLETED | OUTPATIENT
Start: 2022-02-11 | End: 2022-02-11

## 2022-02-11 RX ADMIN — MIDAZOLAM 2 MG: 1 INJECTION INTRAMUSCULAR; INTRAVENOUS at 07:27

## 2022-02-11 RX ADMIN — DEXMEDETOMIDINE HYDROCHLORIDE 8 MCG: 100 INJECTION, SOLUTION INTRAVENOUS at 07:45

## 2022-02-11 RX ADMIN — FENTANYL CITRATE 50 MCG: 50 INJECTION, SOLUTION INTRAMUSCULAR; INTRAVENOUS at 07:41

## 2022-02-11 RX ADMIN — HYDROCODONE BITARTRATE AND ACETAMINOPHEN 1 TABLET: 5; 325 TABLET ORAL at 10:16

## 2022-02-11 RX ADMIN — KETOROLAC TROMETHAMINE 15 MG: 30 INJECTION, SOLUTION INTRAMUSCULAR; INTRAVENOUS at 08:43

## 2022-02-11 RX ADMIN — LIDOCAINE HYDROCHLORIDE 40 MG: 10 INJECTION, SOLUTION EPIDURAL; INFILTRATION; INTRACAUDAL; PERINEURAL at 07:29

## 2022-02-11 RX ADMIN — ONDANSETRON 4 MG: 2 INJECTION INTRAMUSCULAR; INTRAVENOUS at 08:08

## 2022-02-11 RX ADMIN — SODIUM CHLORIDE, SODIUM LACTATE, POTASSIUM CHLORIDE, AND CALCIUM CHLORIDE 125 ML/HR: .6; .31; .03; .02 INJECTION, SOLUTION INTRAVENOUS at 06:36

## 2022-02-11 RX ADMIN — PROPOFOL 120 MG: 10 INJECTION, EMULSION INTRAVENOUS at 07:31

## 2022-02-11 RX ADMIN — DEXAMETHASONE SODIUM PHOSPHATE 4 MG: 4 INJECTION, SOLUTION INTRAMUSCULAR; INTRAVENOUS at 07:35

## 2022-02-11 RX ADMIN — DEXMEDETOMIDINE HYDROCHLORIDE 4 MCG: 100 INJECTION, SOLUTION INTRAVENOUS at 07:28

## 2022-02-11 RX ADMIN — MIDAZOLAM 2 MG: 1 INJECTION INTRAMUSCULAR; INTRAVENOUS at 08:29

## 2022-02-11 RX ADMIN — CEFAZOLIN SODIUM 1000 MG: 1 SOLUTION INTRAVENOUS at 07:26

## 2022-02-11 RX ADMIN — PROPOFOL 100 MCG/KG/MIN: 10 INJECTION, EMULSION INTRAVENOUS at 07:33

## 2022-02-11 RX ADMIN — FENTANYL CITRATE 50 MCG: 50 INJECTION, SOLUTION INTRAMUSCULAR; INTRAVENOUS at 07:35

## 2022-02-11 RX ADMIN — GLYCOPYRROLATE 0.1 MG: 0.2 INJECTION, SOLUTION INTRAMUSCULAR; INTRAVENOUS at 07:30

## 2022-02-11 NOTE — DISCHARGE SUMMARY
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]  Blepharochalasis left lower eyelid [H02 35]  Principal Procedure:   BLEPHAROPLASTY LOWER (Bilateral Eye)  Discharge Medications:  See after visit summary for reconciled discharge medications provided to patient and family  Reason for Admission:  Henrique Herrera was electively admitted to undergo the above named procedure on 2/11/2022 as an outpatient  Hospital Course:  Patient underwent the above named procedure on the day of admission without complications  They were discharged home the same day  Disposition:  To home in care of self and family    Condition:  Good  Follow up:  Patient with follow up in the office with Dr Gaudencio Torres MD in 1 week(s) or as scheduled per his office  Gaudencio Torres MD  2/11/2022 7:05 AM

## 2022-02-11 NOTE — DISCHARGE INSTRUCTIONS
[facility]  [address] [facility], [address] [facility], [address] [facility], [address]   [phone] / [phone] / www [email or web address]  Home Instructions for the Blepharoplasty Patient      Please call the office today to schedule a post operative appointment, and tell the office staff  that you doctor needs see you in our office in 7-10 days  1  Keep ice on for 48 hours (20minutes on, 20 minutes off)  A bag of frozen vegetables works great  2  Apply a light layer of the ophthalmic y ointment that you have been prescribed to the suture line three times  a day  3  Keep your head elevated, especially when sleeping for  the first three days  Patients find sleeping  in a recliner or using three or more pillows will help decreased bruising and swelling  4  Do not sleep on your sides until the sutures are removed  5  You will be given a prescription for pain medicine  You can use extra strength Tylenol as substitute  Follow directions on the bottle  Do not take any aspirin or medication containing aspirin for two weeks following surgery  6  Cosmetics may be applied after your sutures are removed, unless otherwise instructed  7  No excessive sun exposure for 6 weeks  following your surgery, the use of protective sunscreen lotion thereafter at all times will be necessary  8  You may attempt to wear your contact lenses after instructed by your doctor, however if they are not comfortable, remove them at once and wait one or two days before trying again  9  Do not bend, lift or train until instructed  10  Do not smoke  11  Swelling and discoloration is expected, as is uneven swelling (more on one side than the other)  Mild visual blurring for a week or two post surgery is not uncommon  If your eyes become uncomfortable, such as a burning or scratching pain, please report this immediately      12  Do not drive until instructed to do so, usually one week postoperatively  13  You may shower 24 hours after surgery unless otherwise instructed  14  If you have any questions, nino call the office at [phone]

## 2022-02-11 NOTE — OP NOTE
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]     * Blepharochalasis left lower eyelid [H02 35]    Procedure(s) (LRB):  BLEPHAROPLASTY LOWER (Bilateral)    Specimen(s):  * No specimens in log *    Estimated Blood Loss:   Minimal    Drains:  * No LDAs found *    Anesthesia Type:   General/LMA    Operative Indications:  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]  Blepharochalasis left lower eyelid [H02 35]       Operative Findings:  none    Complications:   None    Procedure and Technique:  The patient was properly identified in the operating room and placed    in the supine position on the bed  She was prepped and draped in the    sterile surgical fashion       We first started by marking out the eyes with gentian violet and then    went ahead and  injected skin with 1% lidocaine with    epinephrine  We made a skin only    incision elevating the skin inferiorly for approximately 8 mm  Once    this was done, we then dived down below the orbicularis ocular muscle    just above the orbital septum  We dissected all the way down to the    infraorbital rim  At this point, we opened up the orbital septum along    the inferior leading edge  The fat was then identified and then    sutured down onto the infraorbital rim with 6-0 Vicryl suture  After    this was done, we obtained meticulous hemostasis  We redraped the    eyelid skin  We removed a 1-cm pinch excision of lower eyelid skin     The skin was then sutured to together with 6-0 chromic sutures   The    patient tolerated procedure well and was awakened from surgery,    dressed with antibiotic ointment and taken to the recovery room in    stable condition        I was present for the entire procedure    Patient Disposition:  PACU       SIGNATURE: Triston Adam MD  DATE: February 11, 2022  TIME: 9:01 AM

## 2022-02-11 NOTE — INTERVAL H&P NOTE
H&P reviewed  After examining the patient I find no changes in the patients condition since the H&P had been written      Vitals:    02/11/22 0625   BP: 117/76   Pulse: 79   Resp: 20   Temp: (!) 97 4 °F (36 3 °C)   SpO2: 98%

## 2022-02-21 ENCOUNTER — TELEPHONE (OUTPATIENT)
Dept: INTERNAL MEDICINE CLINIC | Facility: OTHER | Age: 62
End: 2022-02-21

## 2022-02-21 NOTE — TELEPHONE ENCOUNTER
Pt called [facility] script line last week and wanted a refill of her celexa  Now she is out  Checked in chart and med was not sent  Sent to pharmacy  Instructed pt to call office if any problems

## 2022-03-07 RX ORDER — BACITRACIN 500 [USP'U]/G
OINTMENT OPHTHALMIC
COMMUNITY
Start: 2022-01-31 | End: 2022-03-09

## 2022-03-07 RX ORDER — CLINDAMYCIN HYDROCHLORIDE 300 MG/1
300 CAPSULE ORAL 3 TIMES DAILY
COMMUNITY
Start: 2022-01-31 | End: 2022-03-09

## 2022-03-07 RX ORDER — HYDROCODONE BITARTRATE AND ACETAMINOPHEN 5; 325 MG/1; MG/1
TABLET ORAL
COMMUNITY
Start: 2022-01-31 | End: 2022-03-09

## 2022-03-09 ENCOUNTER — OFFICE VISIT (OUTPATIENT)
Dept: INTERNAL MEDICINE CLINIC | Facility: OTHER | Age: 62
End: 2022-03-09
Payer: COMMERCIAL

## 2022-03-09 VITALS
SYSTOLIC BLOOD PRESSURE: 106 MMHG | BODY MASS INDEX: 18.88 KG/M2 | DIASTOLIC BLOOD PRESSURE: 62 MMHG | HEIGHT: 61 IN | HEART RATE: 86 BPM | OXYGEN SATURATION: 99 % | WEIGHT: 100 LBS | TEMPERATURE: 98.8 F

## 2022-03-09 DIAGNOSIS — F41.1 GENERALIZED ANXIETY DISORDER: ICD-10-CM

## 2022-03-09 DIAGNOSIS — F51.01 PRIMARY INSOMNIA: ICD-10-CM

## 2022-03-09 DIAGNOSIS — F41.9 ANXIETY: ICD-10-CM

## 2022-03-09 DIAGNOSIS — E78.89 LIPIDS ABNORMAL: Primary | ICD-10-CM

## 2022-03-09 DIAGNOSIS — G47.09 OTHER INSOMNIA: ICD-10-CM

## 2022-03-09 PROBLEM — Z01.818 PREOPERATIVE CLEARANCE: Status: RESOLVED | Noted: 2022-01-17 | Resolved: 2022-03-09

## 2022-03-09 PROCEDURE — 1036F TOBACCO NON-USER: CPT | Performed by: NURSE PRACTITIONER

## 2022-03-09 PROCEDURE — 3008F BODY MASS INDEX DOCD: CPT | Performed by: NURSE PRACTITIONER

## 2022-03-09 PROCEDURE — 99213 OFFICE O/P EST LOW 20 MIN: CPT | Performed by: NURSE PRACTITIONER

## 2022-03-09 RX ORDER — CITALOPRAM 40 MG/1
40 TABLET ORAL DAILY
Qty: 90 TABLET | Refills: 1 | Status: SHIPPED | OUTPATIENT
Start: 2022-03-09

## 2022-03-09 RX ORDER — BUSPIRONE HYDROCHLORIDE 15 MG/1
15 TABLET ORAL 2 TIMES DAILY
Qty: 180 TABLET | Refills: 1 | Status: SHIPPED | OUTPATIENT
Start: 2022-03-09 | End: 2022-08-05

## 2022-03-09 NOTE — PROGRESS NOTES
Assessment/Plan:    Anxiety  Controlled  Patient will continue on Celexa 40mg,  Patient uses Xanax very sparingly  Continue with BuSpar to 15 mg tablet b i d  Follow up in 6 months or sooner  Lipids abnormal  Will get updated lipid panel and fasting blood work  Recommend healthy lifestyle choices for your cholesterol  Low fat/low cholesterol diet  Limit/avoid red meat  Eat more lean meat - chicken breast, ground turkey, fish  Exercise 30 mins at least 5 times a week as tolerated  Primary insomnia  Controlled with Ambien  Diagnoses and all orders for this visit:    Lipids abnormal    Generalized anxiety disorder  -     busPIRone (BUSPAR) 15 mg tablet; Take 1 tablet (15 mg total) by mouth 2 (two) times a day    Other insomnia  -     busPIRone (BUSPAR) 15 mg tablet; Take 1 tablet (15 mg total) by mouth 2 (two) times a day    Anxiety  -     citalopram (CeleXA) 40 mg tablet; Take 1 tablet (40 mg total) by mouth daily    Primary insomnia    Other orders  -     Discontinue: HYDROcodone-acetaminophen (NORCO) 5-325 mg per tablet; TAKE 1 TABLET BY MOUTH EVERY FOUR TO SIX HOURS AS DIRECTED  -     Discontinue: clindamycin (CLEOCIN) 300 MG capsule; Take 300 mg by mouth 3 (three) times a day  -     Discontinue: bacitracin ophthalmic ointment; APPLY A SMALL AMOUNT ON EYELID FOUR TIMES A DAY          Subjective:      Patient ID: Ger Varela is a 63 y o  female  Patient presents today to follow-up on anxiety, and insomnia  She reports no new concerns  Anxiety- currently well controlled on Celexa and buspar  Xanax as needed has been used sparingly  Insomnia- Ambien as needed        Anxiety  Presents for follow-up visit  Symptoms include excessive worry, insomnia and nervous/anxious behavior  Patient reports no chest pain, dizziness, nausea, palpitations, shortness of breath or suicidal ideas  Symptoms occur rarely  The severity of symptoms is mild   The patient sleeps 4 hours per night  The quality of sleep is non-restorative  Compliance with medications is %  The following portions of the patient's history were reviewed and updated as appropriate: allergies, current medications, past family history, past medical history, past social history, past surgical history and problem list     Review of Systems   Constitutional: Negative for activity change, appetite change, chills, diaphoresis and fever  HENT: Negative for congestion, ear discharge, ear pain, postnasal drip, rhinorrhea, sinus pressure, sinus pain and sore throat  Eyes: Negative for pain, discharge, itching and visual disturbance  Respiratory: Negative for cough, chest tightness, shortness of breath and wheezing  Cardiovascular: Negative for chest pain, palpitations and leg swelling  Gastrointestinal: Negative for abdominal pain, constipation, diarrhea, nausea and vomiting  Endocrine: Negative for polydipsia, polyphagia and polyuria  Genitourinary: Negative for difficulty urinating, dysuria and urgency  Musculoskeletal: Negative for arthralgias, back pain and neck pain  Skin: Negative for rash and wound  Neurological: Negative for dizziness, weakness, numbness and headaches  Psychiatric/Behavioral: Negative for suicidal ideas  The patient is nervous/anxious and has insomnia  Past Medical History:   Diagnosis Date    Anxiety     Arthritis     Last Assessed:  4/17/15    Family history of anesthesia complication     4/3/28 Pt did report her mother "having difficulty coming out of anesthesia" - no specific issues mentioned    Herniated disc, cervical     Hot flashes 5/8/2017    Lipids abnormal     Last Assessed:  8/5/16    Malaise and fatigue 1/9/2012    Menopausal symptoms 1/22/2016    Last Assessment & Plan:  Endorses hot flashes, night sweats, mood lability, difficulty sleeping, vaginal dryness and low libido  Discussed that these symptoms appear consistent with menopause   We discussed options for management including lifestyle changes, OTC medications, moisturizers and lubricants, such as coconut oil   We discussed pharmacologic treatments such as SSRI's, vaginal estrogen and     PONV (postoperative nausea and vomiting)     Shoulder joint pain 2018    Spinal stenosis     Strain of supraspinatus muscle or tendon 2018    Vaginal delivery     Pt reports one V back birth after          Current Outpatient Medications:     busPIRone (BUSPAR) 15 mg tablet, Take 1 tablet (15 mg total) by mouth 2 (two) times a day, Disp: 180 tablet, Rfl: 1    citalopram (CeleXA) 40 mg tablet, Take 1 tablet (40 mg total) by mouth daily, Disp: 90 tablet, Rfl: 1    estradiol (ESTRACE) 0 1 mg/g vaginal cream, Insert 1 g into the vagina daily for 14 days, THEN 1 g 2 (two) times a week , Disp: 42 5 g, Rfl: 5    fluticasone (FLONASE) 50 mcg/act nasal spray, 2 sprays into each nostril daily (Patient taking differently: into each nostril as needed ), Disp: 1 Bottle, Rfl: 11    loratadine (CLARITIN) 10 mg tablet, Take 10 mg by mouth as needed 2/3/22 Not actively taking , Disp: , Rfl:     PreviDent 5000 Sensitive 1 1-5 % PSTE, As directed, Disp: , Rfl:     Sulfacetamide Sodium, Acne, 10 % LOTN, APPLY TOPICALLY TO FACE EVERY MORNING, Disp: , Rfl:     tretinoin (REFISSA) 0 05 % cream, APPLY PEA SIZED AMOUNT TO ENTIRE FACE EVERY NIGHT AT BEDTIME 1 HOUR AFTER WASHING FACE, Disp: , Rfl:     zolpidem (AMBIEN) 5 mg tablet, Take 1 tablet (5 mg total) by mouth daily (Patient taking differently: Take 5 mg by mouth daily Pt reports as needed ), Disp: 30 tablet, Rfl: 0    Allergies   Allergen Reactions    Epinephrine Other (See Comments)     Hyperventilate- flushed and palpitations    Erythromycin Other (See Comments)     Gave pt cdiff    Levofloxacin Other (See Comments)     Lymphadenopathy - rash and hives    Iv Dye [Iodinated Diagnostic Agents] Other (See Comments)     Hyperventilated - flushed - "had to reverse it" -had palpitations     Procaine Other (See Comments)     Annotation - [id number]: When used with Epi- hyperventilated, flushed and palpitations   Ampicillin Other (See Comments)     RASH    Moxifloxacin Rash     Swelling in lymphnodes    Penicillins Rash       Social History   Past Surgical History:   Procedure Laterality Date    BLEPHAROPLASTY Bilateral     upper bleph bilateral    BLEPHAROPLASTY Bilateral 2022    Procedure: BLEPHAROPLASTY LOWER;  Surgeon: Leslie Lloyd MD;  Location:  MAIN OR;  Service: Plastics    BROW LIFT      Tacks placed in and dissolve     SECTION  1993    x1    EPIDURAL BLOCK INJECTION      back    EXPLORATORY LAPAROTOMY      HERNIA REPAIR Right     Last Assessed:  4/17/15- pt reports right femoral    HYSTERECTOMY      Partial    ROTATOR CUFF REPAIR      Last Assessed:  4/17/15    SEPTOPLASTY      SHOULDER SURGERY Left     Pt reports left rotator cuff surgery      Family History   Problem Relation Age of Onset    Lupus Mother     Other Mother         Rheumatic disease    Diabetes Mother     Lung cancer Father 77        smoker    Blindness Brother     Heart attack Brother     Stroke Brother     Prostate cancer Brother     No Known Problems Maternal Grandmother     No Known Problems Maternal Grandfather     No Known Problems Paternal Grandmother     No Known Problems Paternal Grandfather     No Known Problems Son     No Known Problems Son        Objective:  /62 (BP Location: Left arm, Patient Position: Sitting, Cuff Size: Adult)   Pulse 86   Temp 98 8 °F (37 1 °C) (Temporal)   Ht 5' 1" (1 549 m)   Wt 45 4 kg (100 lb)   LMP  (LMP Unknown) Comment: Partial hysterectomy per pt  SpO2 99%   BMI 18 89 kg/m²     No results found for this or any previous visit (from the past 1344 hour(s))  Physical Exam  Constitutional:       General: She is not in acute distress  Appearance: She is well-developed   She is not diaphoretic  HENT:      Head: Normocephalic and atraumatic  Right Ear: External ear normal       Left Ear: External ear normal       Nose: Nose normal       Mouth/Throat:      Pharynx: No oropharyngeal exudate  Eyes:      General:         Right eye: No discharge  Left eye: No discharge  Conjunctiva/sclera: Conjunctivae normal       Pupils: Pupils are equal, round, and reactive to light  Neck:      Thyroid: No thyromegaly  Cardiovascular:      Rate and Rhythm: Normal rate and regular rhythm  Heart sounds: Normal heart sounds  No murmur heard  No friction rub  No gallop  Pulmonary:      Effort: Pulmonary effort is normal  No respiratory distress  Breath sounds: Normal breath sounds  No stridor  No wheezing or rales  Abdominal:      General: Bowel sounds are normal  There is no distension  Palpations: Abdomen is soft  Tenderness: There is no abdominal tenderness  Musculoskeletal:      Cervical back: Normal range of motion and neck supple  Lymphadenopathy:      Cervical: No cervical adenopathy  Skin:     General: Skin is warm and dry  Findings: No erythema or rash  Neurological:      Mental Status: She is alert and oriented to person, place, and time  Psychiatric:         Behavior: Behavior normal          Thought Content:  Thought content normal          Judgment: Judgment normal

## 2022-04-04 ENCOUNTER — OFFICE VISIT (OUTPATIENT)
Dept: OBGYN CLINIC | Facility: CLINIC | Age: 62
End: 2022-04-04
Payer: COMMERCIAL

## 2022-04-04 VITALS
BODY MASS INDEX: 18.88 KG/M2 | SYSTOLIC BLOOD PRESSURE: 132 MMHG | WEIGHT: 100 LBS | TEMPERATURE: 99.9 F | DIASTOLIC BLOOD PRESSURE: 70 MMHG | HEIGHT: 61 IN

## 2022-04-04 DIAGNOSIS — N75.1 BARTHOLIN'S GLAND ABSCESS: Primary | ICD-10-CM

## 2022-04-04 PROCEDURE — 87070 CULTURE OTHR SPECIMN AEROBIC: CPT | Performed by: CLINICAL NURSE SPECIALIST

## 2022-04-04 PROCEDURE — 87186 SC STD MICRODIL/AGAR DIL: CPT | Performed by: CLINICAL NURSE SPECIALIST

## 2022-04-04 PROCEDURE — 56420 I&D BARTHOLINS GLAND ABSCESS: CPT | Performed by: CLINICAL NURSE SPECIALIST

## 2022-04-04 PROCEDURE — 87077 CULTURE AEROBIC IDENTIFY: CPT | Performed by: CLINICAL NURSE SPECIALIST

## 2022-04-04 RX ORDER — MELOXICAM 7.5 MG/1
7.5 TABLET ORAL DAILY
COMMUNITY

## 2022-04-04 RX ORDER — CEPHALEXIN 500 MG/1
500 CAPSULE ORAL 4 TIMES DAILY
Qty: 40 CAPSULE | Refills: 0 | Status: SHIPPED | OUTPATIENT
Start: 2022-04-04 | End: 2022-04-14

## 2022-04-04 NOTE — PROGRESS NOTES
Assessment/Plan:       1  Bartholin's gland abscess  Assessment & Plan:  I&D performed without issue for purulent drainage  Culture obtained  Keflex given   F/u in 5-7 days  Orders:  -     cephalexin (KEFLEX) 500 mg capsule; Take 1 capsule (500 mg total) by mouth 4 (four) times a day for 10 days  -     Genital Comprehensive Culture          Subjective:      Patient ID: Marshall Mcdonald is a 63 y o  female  She is here for Bartholin's Cyst (Patient had this cyst fro years but not bothersome  She states it's gotten bigger like the size of a golfball  Her  is worried  She is having body aches and possible fever  She bumped it getting in the car and saw stars it hurt so bad  )    HPI  Prior hx of right bartholins cyst, that was present without inflammation/infection for several years  She has noted however that it has increased in size and tenderness  States approximately the size of a golfball  And very tender if touched  C/o chills like she has a fever- temp here 99 9  Menstrual History:  No LMP recorded (lmp unknown)  Patient has had a hysterectomy  The following portions of the patient's history were reviewed and updated as appropriate: allergies, current medications, past family history, past medical history, past social history, past surgical history, and problem list     Review of Systems  See HPI for pertinent positives          Objective:    /70 (BP Location: Right arm, Patient Position: Sitting, Cuff Size: Standard)   Temp 99 9 °F (37 7 °C)   Ht 5' 0 5" (1 537 m)   Wt 45 4 kg (100 lb)   LMP  (LMP Unknown) Comment: Partial hysterectomy per pt  BMI 19 21 kg/m²      Physical Exam  Constitutional:       General: She is not in acute distress  Appearance: Normal appearance  Genitourinary:      Genitourinary Comments: Pelvic exam deferred        Right Labia: Bartholin's cyst            Cardiovascular:      Rate and Rhythm: Normal rate     Pulmonary:      Effort: Pulmonary effort is normal    Abdominal:      General: There is no distension  Palpations: Abdomen is soft  Musculoskeletal:         General: Normal range of motion  Neurological:      Mental Status: She is alert and oriented to person, place, and time  Skin:     General: Skin is warm and dry  Psychiatric:         Mood and Affect: Mood normal          Behavior: Behavior normal                Incision and drain    Date/Time: 4/4/2022 11:11 AM  Performed by: Sherryle Rideau, CRNP  Authorized by: Sherryle Rideau, CRNP   Universal Protocol:  Consent: Verbal consent obtained  Risks and benefits: risks, benefits and alternatives were discussed  Consent given by: patient  Time out: Immediately prior to procedure a "time out" was called to verify the correct patient, procedure, equipment, support staff and site/side marked as required  Patient understanding: patient states understanding of the procedure being performed  Required items: required blood products, implants, devices, and special equipment available  Patient identity confirmed: verbally with patient      Patient location:  Clinic  Location:     Type:  Abscess and Bartholin cyst    Size:  4cm    Location:  Anogenital    Anogenital location:  Bartholin's gland (right)  Pre-procedure details:     Skin preparation:  Betadine  Anesthesia (see MAR for exact dosages): Anesthesia method:  Local infiltration    Local anesthetic:  Lidocaine 1% w/o epi  Procedure details:     Complexity:  Simple    Incision types:  Stab incision    Scalpel blade:  11    Approach:  Puncture    Incision depth:  Subcutaneous    Wound management:  Probed and deloculated    Drainage:  Purulent    Drainage amount:  Copious    Wound treatment:  Wound left open    Packing materials:  None  Post-procedure details:     Patient tolerance of procedure:   Tolerated well, no immediate complications  Comments:      Post procedure instructions given

## 2022-04-04 NOTE — PATIENT INSTRUCTIONS
Please do warm soaks or sitz bath 4 times daily  May use ice PRN and tylenol PRN as well  Keep area clean  And dry    Return in 5-7 days for recheck

## 2022-04-04 NOTE — ASSESSMENT & PLAN NOTE
I&D performed without issue for purulent drainage  Culture obtained  Keflex given   F/u in 5-7 days

## 2022-04-07 LAB
BACTERIA GENITAL AEROBE CULT: ABNORMAL
BACTERIA GENITAL AEROBE CULT: ABNORMAL

## 2022-04-08 ENCOUNTER — OFFICE VISIT (OUTPATIENT)
Dept: OBGYN CLINIC | Facility: CLINIC | Age: 62
End: 2022-04-08
Payer: COMMERCIAL

## 2022-04-08 VITALS
DIASTOLIC BLOOD PRESSURE: 70 MMHG | WEIGHT: 100.8 LBS | HEIGHT: 60 IN | SYSTOLIC BLOOD PRESSURE: 120 MMHG | BODY MASS INDEX: 19.79 KG/M2

## 2022-04-08 DIAGNOSIS — N75.1 BARTHOLIN'S GLAND ABSCESS: Primary | ICD-10-CM

## 2022-04-08 PROCEDURE — 3008F BODY MASS INDEX DOCD: CPT | Performed by: OBSTETRICS & GYNECOLOGY

## 2022-04-08 PROCEDURE — 56420 I&D BARTHOLINS GLAND ABSCESS: CPT | Performed by: OBSTETRICS & GYNECOLOGY

## 2022-04-08 NOTE — PROGRESS NOTES
Assessment/Plan:  Re-accumulation of Bartholin abscess due to loculations  Repeat I&D, loculations broken up  Word catheter placed     s/p I&D 4/4 - E coli on Keflex    Return to the office in 6 days, if no improvement surgery 4/15  Warm soaks b i d  x 2 days then daily  Continue Keflex    Diagnoses and all orders for this visit:    Bartholin's gland abscess    Other orders  -     Incision and drain              Subjective:        Patient ID: Sylvester Miles is a 63 y o  female  Leisa Aranda returns for an unscheduled visit due to a worsening of her Bartholin's gland abscess  This was lanced on for 4/4 and she received immediate relief  The area was cultured  She was placed on Keflex  She has been compliant with this and soaking twice daily  E coli was present on culture  Things are going well until yesterday when she experienced swelling and pain once more  However, she was able to exercise this morning before the visit  She exercises 5 days a week usually with her   She denies any fever chills  Fever was present 4 days ago  She still has 3 days of Keflex to take  The following portions of the patient's history were reviewed and updated as appropriate: She  has a past medical history of Anxiety, Arthritis, Family history of anesthesia complication, Herniated disc, cervical, Hot flashes (5/8/2017), Lipids abnormal, Malaise and fatigue (1/9/2012), Menopausal symptoms (1/22/2016), PONV (postoperative nausea and vomiting), Shoulder joint pain (7/12/2018), Spinal stenosis, Strain of supraspinatus muscle or tendon (7/12/2018), and Vaginal delivery    Patient Active Problem List    Diagnosis Date Noted    Bartholin's gland abscess 04/04/2022    Dermatochalasis 02/04/2022    Primary insomnia 08/30/2018    Lumbosacral spondylosis without myelopathy 08/08/2018    Anxiety 07/12/2018    Lipids abnormal 07/12/2018    Cervical radiculopathy 07/12/2017    Cervical spondylosis without myelopathy 2017    Neck pain 2017    Dense breasts 2017     She  has a past surgical history that includes Hysterectomy (); Rotator cuff repair; Epidural block injection; Septoplasty; Exploratory laparotomy (); Hernia repair (Right);  section (); Shoulder surgery (Left); Brow lift; Blepharoplasty (Bilateral); and BLEPHAROPLASTY (Bilateral, 2022)  Her family history includes Blindness in her brother; Diabetes in her mother; Heart attack in her brother; Lung cancer (age of onset: 77) in her father; Lupus in her mother; No Known Problems in her maternal grandfather, maternal grandmother, paternal grandfather, paternal grandmother, son, and son; Other in her mother; Prostate cancer in her brother; Stroke in her brother  She  reports that she has never smoked  She has never used smokeless tobacco  She reports current alcohol use of about 1 0 - 2 0 standard drink of alcohol per week  She reports that she does not use drugs  Current Outpatient Medications   Medication Sig Dispense Refill    busPIRone (BUSPAR) 15 mg tablet Take 1 tablet (15 mg total) by mouth 2 (two) times a day 180 tablet 1    cephalexin (KEFLEX) 500 mg capsule Take 1 capsule (500 mg total) by mouth 4 (four) times a day for 10 days 40 capsule 0    citalopram (CeleXA) 40 mg tablet Take 1 tablet (40 mg total) by mouth daily 90 tablet 1    estradiol (ESTRACE) 0 1 mg/g vaginal cream Insert 1 g into the vagina daily for 14 days, THEN 1 g 2 (two) times a week   42 5 g 5    fluticasone (FLONASE) 50 mcg/act nasal spray 2 sprays into each nostril daily (Patient taking differently: into each nostril as needed ) 1 Bottle 11    loratadine (CLARITIN) 10 mg tablet Take 10 mg by mouth as needed 2/3/22 Not actively taking  (Patient not taking: Reported on 2022 )      meloxicam (Mobic) 7 5 mg tablet Take 7 5 mg by mouth daily      PreviDent 5000 Sensitive 1 1-5 % PSTE As directed      Sulfacetamide Sodium, Acne, 10 % LOTN APPLY TOPICALLY TO FACE EVERY MORNING      tretinoin (REFISSA) 0 05 % cream APPLY PEA SIZED AMOUNT TO ENTIRE FACE EVERY NIGHT AT BEDTIME 1 HOUR AFTER WASHING FACE      zolpidem (AMBIEN) 5 mg tablet Take 1 tablet (5 mg total) by mouth daily (Patient not taking: Reported on 4/4/2022 ) 30 tablet 0     No current facility-administered medications for this visit  Current Outpatient Medications on File Prior to Visit   Medication Sig    busPIRone (BUSPAR) 15 mg tablet Take 1 tablet (15 mg total) by mouth 2 (two) times a day    cephalexin (KEFLEX) 500 mg capsule Take 1 capsule (500 mg total) by mouth 4 (four) times a day for 10 days    citalopram (CeleXA) 40 mg tablet Take 1 tablet (40 mg total) by mouth daily    estradiol (ESTRACE) 0 1 mg/g vaginal cream Insert 1 g into the vagina daily for 14 days, THEN 1 g 2 (two) times a week   fluticasone (FLONASE) 50 mcg/act nasal spray 2 sprays into each nostril daily (Patient taking differently: into each nostril as needed )    loratadine (CLARITIN) 10 mg tablet Take 10 mg by mouth as needed 2/3/22 Not actively taking  (Patient not taking: Reported on 4/4/2022 )    meloxicam (Mobic) 7 5 mg tablet Take 7 5 mg by mouth daily    PreviDent 5000 Sensitive 1 1-5 % PSTE As directed    Sulfacetamide Sodium, Acne, 10 % LOTN APPLY TOPICALLY TO FACE EVERY MORNING    tretinoin (REFISSA) 0 05 % cream APPLY PEA SIZED AMOUNT TO ENTIRE FACE EVERY NIGHT AT BEDTIME 1 HOUR AFTER WASHING FACE    zolpidem (AMBIEN) 5 mg tablet Take 1 tablet (5 mg total) by mouth daily (Patient not taking: Reported on 4/4/2022 )     No current facility-administered medications on file prior to visit  She is allergic to epinephrine, erythromycin, levofloxacin, iv dye [iodinated diagnostic agents], procaine, ampicillin, moxifloxacin, and penicillins       Review of Systems   Constitutional: Negative for activity change, appetite change, chills and fever     Respiratory: Negative for shortness of breath  Cardiovascular: Negative for chest pain  Gastrointestinal: Negative for abdominal pain  Genitourinary: Positive for vaginal discharge and vaginal pain  Negative for difficulty urinating and frequency  Objective:    Vitals:    04/08/22 1126   BP: 120/70   Weight: 45 7 kg (100 lb 12 8 oz)   Height: 5' 0 25" (1 53 m)            Physical Exam  Vitals and nursing note reviewed  Exam conducted with a chaperone present  Constitutional:       Appearance: Normal appearance  She is normal weight  HENT:      Head: Normocephalic and atraumatic  Eyes:      General: No scleral icterus  Right eye: No discharge  Left eye: No discharge  Extraocular Movements: Extraocular movements intact  Genitourinary:     Comments: 4 cm enlargement of the inferior right labia majora with surrounding edema  Very tender to touch  Previous incision area closed  Skin:     General: Skin is warm and dry  Findings: No rash  Neurological:      Mental Status: She is alert and oriented to person, place, and time  Psychiatric:         Mood and Affect: Mood normal          Behavior: Behavior normal          Thought Content: Thought content normal          Judgment: Judgment normal        Incision and drain    Date/Time: 4/8/2022 12:11 PM  Performed by: Kapil Jacob MD  Authorized by: Kapil Jacob MD   Universal Protocol:  Consent: Verbal consent obtained  Written consent obtained  Risks and benefits: risks, benefits and alternatives were discussed  Consent given by: patient  Time out: Immediately prior to procedure a "time out" was called to verify the correct patient, procedure, equipment, support staff and site/side marked as required    Patient understanding: patient states understanding of the procedure being performed  Patient consent: the patient's understanding of the procedure matches consent given  Procedure consent: procedure consent matches procedure scheduled  Patient identity confirmed: verbally with patient      Patient location:  Clinic  Location:     Type:  Abscess and Bartholin cyst    Size:  4 cm    Location:  Anogenital    Anogenital location:  Bartholin's gland  Pre-procedure details:     Skin preparation:  Betadine  Anesthesia (see MAR for exact dosages): Anesthesia method:  Local infiltration    Local anesthetic:  Lidocaine 1% w/o epi  Procedure details:     Complexity:  Intermediate    Incision types:  Stab incision    Scalpel blade:  11    Approach:  Open    Incision depth:  Submucosal    Wound management:  Probed and deloculated    Drainage:  Purulent    Drainage amount: Moderate    Wound treatment:  Drain placed    Packing materials: Word catheter  Post-procedure details:     Patient tolerance of procedure: Tolerated well, no immediate complications  Comments: The area was very tender  It was cleansed with Betadine solution  3 cc 1% lidocaine was instilled submucosally around the recent incision sites  After several minutes a 11  Blade was used to incise the mucosa and submucosal tissue  Purulent drainage was present  A hemostat was then used to break up loculated areas and additional purulence drainage occurred  Another area felt enlarged possibly representing another loculation  The 11 blade was then used to directly access the site  There was no puncture or purulent drainage  I felt this just represented edema  A Word catheter was then tested and found to be in working order  It was inserted and 1 7 cc of sterile water was instilled to blow up the balloon  The catheter then was tucked into the vaginal introitus  Patient tolerated the procedure well  Hemostasis was present

## 2022-04-13 PROBLEM — Z79.818 CURRENT USE OF ESTROGEN THERAPY: Status: ACTIVE | Noted: 2022-04-13

## 2022-04-13 PROBLEM — Z79.899 CURRENT USE OF ESTROGEN THERAPY: Status: ACTIVE | Noted: 2022-04-13

## 2022-04-13 PROBLEM — N95.2 ATROPHIC VAGINITIS: Status: ACTIVE | Noted: 2022-04-13

## 2022-04-13 NOTE — PROGRESS NOTES
Assessment/Plan:  Successful retention of Word catheter  Apply a dab of Estrace to the area nightly to assist healing  Return to the office in 3 weeks for catheter removal  At that point transition to 1 gm hs twice weekly via an applicator       Diagnoses and all orders for this visit:    Bartholin's gland abscess    Atrophic vaginitis    Current use of estrogen therapy              Subjective:        Patient ID: Shamar Lugo is a 63 y o  female  Tahira Lucas returns today for a follow-up visit  She feels so much better  She was able to return to the gym on Monday 3 days after the catheter was placed  She had stopped using vaginal estrogen months ago and she was concerned about systemic absorption  Around the same time she was noting hot flashes  I explained that that was probably a coincidence and that the estrogen would help this heal better  It was originally prescribed for vaginal dryness and dyspareunia  The following portions of the patient's history were reviewed and updated as appropriate: She  has a past medical history of Anxiety, Arthritis, Family history of anesthesia complication, Herniated disc, cervical, Hot flashes (5/8/2017), Lipids abnormal, Malaise and fatigue (1/9/2012), Menopausal symptoms (1/22/2016), PONV (postoperative nausea and vomiting), Shoulder joint pain (7/12/2018), Spinal stenosis, Strain of supraspinatus muscle or tendon (7/12/2018), and Vaginal delivery    Patient Active Problem List    Diagnosis Date Noted    Atrophic vaginitis 04/13/2022    Current use of estrogen therapy 04/13/2022    Bartholin's gland abscess 04/04/2022    Dermatochalasis 02/04/2022    Primary insomnia 08/30/2018    Lumbosacral spondylosis without myelopathy 08/08/2018    Anxiety 07/12/2018    Lipids abnormal 07/12/2018    Cervical radiculopathy 07/12/2017    Cervical spondylosis without myelopathy 06/08/2017    Neck pain 06/08/2017    Dense breasts 05/08/2017     She  has a past surgical history that includes Hysterectomy (); Rotator cuff repair; Epidural block injection; Septoplasty; Exploratory laparotomy (); Hernia repair (Right);  section (); Shoulder surgery (Left); Brow lift; Blepharoplasty (Bilateral); and BLEPHAROPLASTY (Bilateral, 2022)  Her family history includes Blindness in her brother; Diabetes in her mother; Heart attack in her brother; Lung cancer (age of onset: 77) in her father; Lupus in her mother; No Known Problems in her maternal grandfather, maternal grandmother, paternal grandfather, paternal grandmother, son, and son; Other in her mother; Prostate cancer in her brother; Stroke in her brother  She  reports that she has never smoked  She has never used smokeless tobacco  She reports current alcohol use of about 1 0 - 2 0 standard drink of alcohol per week  She reports that she does not use drugs  Current Outpatient Medications   Medication Sig Dispense Refill    busPIRone (BUSPAR) 15 mg tablet Take 1 tablet (15 mg total) by mouth 2 (two) times a day 180 tablet 1    cephalexin (KEFLEX) 500 mg capsule Take 1 capsule (500 mg total) by mouth 4 (four) times a day for 10 days 40 capsule 0    citalopram (CeleXA) 40 mg tablet Take 1 tablet (40 mg total) by mouth daily 90 tablet 1    estradiol (ESTRACE) 0 1 mg/g vaginal cream Insert 1 g into the vagina daily for 14 days, THEN 1 g 2 (two) times a week   42 5 g 5    fluticasone (FLONASE) 50 mcg/act nasal spray 2 sprays into each nostril daily (Patient taking differently: into each nostril as needed ) 1 Bottle 11    loratadine (CLARITIN) 10 mg tablet Take 10 mg by mouth as needed 2/3/22 Not actively taking  (Patient not taking: Reported on 2022 )      meloxicam (Mobic) 7 5 mg tablet Take 7 5 mg by mouth daily      PreviDent 5000 Sensitive 1 1-5 % PSTE As directed      Sulfacetamide Sodium, Acne, 10 % LOTN APPLY TOPICALLY TO FACE EVERY MORNING      tretinoin (REFISSA) 0 05 % cream APPLY PEA SIZED AMOUNT TO ENTIRE FACE EVERY NIGHT AT BEDTIME 1 HOUR AFTER WASHING FACE      zolpidem (AMBIEN) 5 mg tablet Take 1 tablet (5 mg total) by mouth daily (Patient not taking: Reported on 4/4/2022 ) 30 tablet 0     No current facility-administered medications for this visit  Current Outpatient Medications on File Prior to Visit   Medication Sig    busPIRone (BUSPAR) 15 mg tablet Take 1 tablet (15 mg total) by mouth 2 (two) times a day    cephalexin (KEFLEX) 500 mg capsule Take 1 capsule (500 mg total) by mouth 4 (four) times a day for 10 days    citalopram (CeleXA) 40 mg tablet Take 1 tablet (40 mg total) by mouth daily    estradiol (ESTRACE) 0 1 mg/g vaginal cream Insert 1 g into the vagina daily for 14 days, THEN 1 g 2 (two) times a week   fluticasone (FLONASE) 50 mcg/act nasal spray 2 sprays into each nostril daily (Patient taking differently: into each nostril as needed )    loratadine (CLARITIN) 10 mg tablet Take 10 mg by mouth as needed 2/3/22 Not actively taking  (Patient not taking: Reported on 4/4/2022 )    meloxicam (Mobic) 7 5 mg tablet Take 7 5 mg by mouth daily    PreviDent 5000 Sensitive 1 1-5 % PSTE As directed    Sulfacetamide Sodium, Acne, 10 % LOTN APPLY TOPICALLY TO FACE EVERY MORNING    tretinoin (REFISSA) 0 05 % cream APPLY PEA SIZED AMOUNT TO ENTIRE FACE EVERY NIGHT AT BEDTIME 1 HOUR AFTER WASHING FACE    zolpidem (AMBIEN) 5 mg tablet Take 1 tablet (5 mg total) by mouth daily (Patient not taking: Reported on 4/4/2022 )     No current facility-administered medications on file prior to visit  She is allergic to epinephrine, erythromycin, levofloxacin, iv dye [iodinated diagnostic agents], procaine, ampicillin, moxifloxacin, and penicillins       Review of Systems   Constitutional: Negative  Negative for chills and fever  Genitourinary: Negative for difficulty urinating, pelvic pain, vaginal bleeding and vaginal pain           Objective:    Vitals:    04/14/22 1019   BP: 114/60 Weight: 45 5 kg (100 lb 6 4 oz)            Physical Exam  Vitals and nursing note reviewed  Exam conducted with a chaperone present  Constitutional:       Appearance: Normal appearance  She is normal weight  Genitourinary:     Comments: The right vulva is slightly distended  There were catheter is in place  Tip is tucked into the vagina  There is no erythema or tenderness  Skin:     General: Skin is warm  Neurological:      Mental Status: She is alert

## 2022-04-14 ENCOUNTER — OFFICE VISIT (OUTPATIENT)
Dept: OBGYN CLINIC | Facility: CLINIC | Age: 62
End: 2022-04-14
Payer: COMMERCIAL

## 2022-04-14 VITALS — BODY MASS INDEX: 19.45 KG/M2 | SYSTOLIC BLOOD PRESSURE: 114 MMHG | WEIGHT: 100.4 LBS | DIASTOLIC BLOOD PRESSURE: 60 MMHG

## 2022-04-14 DIAGNOSIS — N95.2 ATROPHIC VAGINITIS: ICD-10-CM

## 2022-04-14 DIAGNOSIS — Z79.899 CURRENT USE OF ESTROGEN THERAPY: ICD-10-CM

## 2022-04-14 DIAGNOSIS — N75.1 BARTHOLIN'S GLAND ABSCESS: Primary | ICD-10-CM

## 2022-04-14 PROCEDURE — 99212 OFFICE O/P EST SF 10 MIN: CPT | Performed by: OBSTETRICS & GYNECOLOGY

## 2022-04-14 PROCEDURE — 1036F TOBACCO NON-USER: CPT | Performed by: OBSTETRICS & GYNECOLOGY

## 2022-05-04 NOTE — PROGRESS NOTES
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  She has no complaints  She does not realize it is there  She has been able to exercise  The following portions of the patient's history were reviewed and updated as appropriate: She  has a past medical history of Anxiety, Arthritis, Family history of anesthesia complication, Herniated disc, cervical, Hot flashes (2017), Lipids abnormal, Malaise and fatigue (2012), Menopausal symptoms (2016), PONV (postoperative nausea and vomiting), Shoulder joint pain (2018), Spinal stenosis, Strain of supraspinatus muscle or tendon (2018), and Vaginal delivery  Patient Active Problem List    Diagnosis Date Noted    Atrophic vaginitis 2022    Current use of estrogen therapy 2022    Bartholin's gland abscess 2022    Dermatochalasis 2022    Primary insomnia 2018    Lumbosacral spondylosis without myelopathy 2018    Anxiety 2018    Lipids abnormal 2018    Cervical radiculopathy 2017    Cervical spondylosis without myelopathy 2017    Neck pain 2017    Dense breasts 2017     She  has a past surgical history that includes Hysterectomy (); Rotator cuff repair; Epidural block injection; Septoplasty; Exploratory laparotomy (); Hernia repair (Right);  section (); Shoulder surgery (Left); Brow lift; Blepharoplasty (Bilateral); and BLEPHAROPLASTY (Bilateral, 2022)    Her family history includes Blindness in her brother; Diabetes in her mother; Heart attack in her brother; Lung cancer (age of onset: 77) in her father; Lupus in her mother; No Known Problems in her maternal grandfather, maternal grandmother, paternal grandfather, paternal grandmother, son, and son; Other in her mother; Prostate cancer in her brother; Stroke in her brother  She  reports that she has never smoked  She has never used smokeless tobacco  She reports current alcohol use of about 1 0 - 2 0 standard drink of alcohol per week  She reports that she does not use drugs  Current Outpatient Medications   Medication Sig Dispense Refill    busPIRone (BUSPAR) 15 mg tablet Take 1 tablet (15 mg total) by mouth 2 (two) times a day 180 tablet 1    citalopram (CeleXA) 40 mg tablet Take 1 tablet (40 mg total) by mouth daily 90 tablet 1    estradiol (ESTRACE) 0 1 mg/g vaginal cream Insert 1 g into the vagina daily for 14 days, THEN 1 g 2 (two) times a week  42 5 g 5    fluticasone (FLONASE) 50 mcg/act nasal spray 2 sprays into each nostril daily (Patient taking differently: into each nostril as needed ) 1 Bottle 11    loratadine (CLARITIN) 10 mg tablet Take 10 mg by mouth as needed 2/3/22 Not actively taking  (Patient not taking: Reported on 4/4/2022 )      meloxicam (Mobic) 7 5 mg tablet Take 7 5 mg by mouth daily      PreviDent 5000 Sensitive 1 1-5 % PSTE As directed      Sulfacetamide Sodium, Acne, 10 % LOTN APPLY TOPICALLY TO FACE EVERY MORNING      tretinoin (REFISSA) 0 05 % cream APPLY PEA SIZED AMOUNT TO ENTIRE FACE EVERY NIGHT AT BEDTIME 1 HOUR AFTER WASHING FACE      zolpidem (AMBIEN) 5 mg tablet Take 1 tablet (5 mg total) by mouth daily (Patient not taking: Reported on 4/4/2022 ) 30 tablet 0     No current facility-administered medications for this visit       Current Outpatient Medications on File Prior to Visit   Medication Sig    busPIRone (BUSPAR) 15 mg tablet Take 1 tablet (15 mg total) by mouth 2 (two) times a day    citalopram (CeleXA) 40 mg tablet Take 1 tablet (40 mg total) by mouth daily    estradiol (ESTRACE) 0 1 mg/g vaginal cream Insert 1 g into the vagina daily for 14 days, THEN 1 g 2 (two) times a week   fluticasone (FLONASE) 50 mcg/act nasal spray 2 sprays into each nostril daily (Patient taking differently: into each nostril as needed )    loratadine (CLARITIN) 10 mg tablet Take 10 mg by mouth as needed 2/3/22 Not actively taking  (Patient not taking: Reported on 4/4/2022 )    meloxicam (Mobic) 7 5 mg tablet Take 7 5 mg by mouth daily    PreviDent 5000 Sensitive 1 1-5 % PSTE As directed    Sulfacetamide Sodium, Acne, 10 % LOTN APPLY TOPICALLY TO FACE EVERY MORNING    tretinoin (REFISSA) 0 05 % cream APPLY PEA SIZED AMOUNT TO ENTIRE FACE EVERY NIGHT AT BEDTIME 1 HOUR AFTER WASHING FACE    zolpidem (AMBIEN) 5 mg tablet Take 1 tablet (5 mg total) by mouth daily (Patient not taking: Reported on 4/4/2022 )     No current facility-administered medications on file prior to visit  She is allergic to epinephrine, erythromycin, levofloxacin, iv dye [iodinated diagnostic agents], procaine, ampicillin, moxifloxacin, and penicillins       Review of Systems   Constitutional: Negative  Objective:    Vitals:    05/05/22 0829   BP: 128/64   Weight: 46 kg (101 lb 6 4 oz)            Physical Exam  Vitals reviewed  Exam conducted with a chaperone present  Constitutional:       Appearance: Normal appearance  Genitourinary:     General: Normal vulva  Comments: Enlargement of the right Bartholin gland from the catheter  Catheter removed intact  The ostia is patent  Neurological:      Mental Status: She is alert

## 2022-05-05 ENCOUNTER — OFFICE VISIT (OUTPATIENT)
Dept: OBGYN CLINIC | Facility: CLINIC | Age: 62
End: 2022-05-05
Payer: COMMERCIAL

## 2022-05-05 VITALS — WEIGHT: 101.4 LBS | DIASTOLIC BLOOD PRESSURE: 64 MMHG | BODY MASS INDEX: 19.64 KG/M2 | SYSTOLIC BLOOD PRESSURE: 128 MMHG

## 2022-05-05 DIAGNOSIS — N75.1 BARTHOLIN'S GLAND ABSCESS: Primary | ICD-10-CM

## 2022-05-05 DIAGNOSIS — N95.2 ATROPHIC VAGINITIS: ICD-10-CM

## 2022-05-05 DIAGNOSIS — Z79.899 CURRENT USE OF ESTROGEN THERAPY: ICD-10-CM

## 2022-05-05 PROCEDURE — 99212 OFFICE O/P EST SF 10 MIN: CPT | Performed by: OBSTETRICS & GYNECOLOGY

## 2022-08-02 ENCOUNTER — TELEPHONE (OUTPATIENT)
Dept: OBGYN CLINIC | Facility: CLINIC | Age: 62
End: 2022-08-02

## 2022-08-02 DIAGNOSIS — N95.2 ATROPHIC VAGINITIS: ICD-10-CM

## 2022-08-03 DIAGNOSIS — N95.2 ATROPHIC VAGINITIS: ICD-10-CM

## 2022-08-03 RX ORDER — ESTRADIOL 0.1 MG/G
CREAM VAGINAL
Qty: 42.5 G | Refills: 0 | Status: SHIPPED | OUTPATIENT
Start: 2022-08-03 | End: 2022-08-23

## 2022-08-03 NOTE — TELEPHONE ENCOUNTER
In May she was instructed to return for annual visit  She was due for annual visit in February  I am not the 1 who originally prescribed the Estrace  Please make sure she gets an appointment scheduled    I placed 1 Rx to without refills

## 2022-08-05 ENCOUNTER — OFFICE VISIT (OUTPATIENT)
Dept: INTERNAL MEDICINE CLINIC | Age: 62
End: 2022-08-05
Payer: COMMERCIAL

## 2022-08-05 VITALS
DIASTOLIC BLOOD PRESSURE: 78 MMHG | SYSTOLIC BLOOD PRESSURE: 128 MMHG | OXYGEN SATURATION: 98 % | WEIGHT: 101 LBS | HEIGHT: 60 IN | TEMPERATURE: 98.2 F | BODY MASS INDEX: 19.83 KG/M2 | HEART RATE: 74 BPM

## 2022-08-05 DIAGNOSIS — M62.830 MUSCLE SPASM OF BACK: ICD-10-CM

## 2022-08-05 DIAGNOSIS — M54.12 CERVICAL RADICULOPATHY: ICD-10-CM

## 2022-08-05 DIAGNOSIS — G89.29 CHRONIC NECK PAIN: ICD-10-CM

## 2022-08-05 DIAGNOSIS — M54.2 ACUTE NECK PAIN: Primary | ICD-10-CM

## 2022-08-05 DIAGNOSIS — M54.2 CHRONIC NECK PAIN: ICD-10-CM

## 2022-08-05 PROCEDURE — 3725F SCREEN DEPRESSION PERFORMED: CPT | Performed by: INTERNAL MEDICINE

## 2022-08-05 PROCEDURE — 99214 OFFICE O/P EST MOD 30 MIN: CPT | Performed by: INTERNAL MEDICINE

## 2022-08-05 RX ORDER — SODIUM FLUORIDE1.1%, POTASSIUM NITRATE 5% 5.8; 57.5 MG/ML; MG/ML
GEL, DENTIFRICE DENTAL
COMMUNITY
Start: 2022-05-20 | End: 2022-10-12 | Stop reason: SDUPTHER

## 2022-08-05 RX ORDER — METHOCARBAMOL 500 MG/1
500 TABLET, FILM COATED ORAL 3 TIMES DAILY
Qty: 30 TABLET | Refills: 0 | Status: SHIPPED | OUTPATIENT
Start: 2022-08-05

## 2022-08-05 RX ORDER — HYDROCODONE BITARTRATE AND ACETAMINOPHEN 5; 325 MG/1; MG/1
1 TABLET ORAL EVERY 6 HOURS PRN
Qty: 20 TABLET | Refills: 0 | Status: SHIPPED | OUTPATIENT
Start: 2022-08-05 | End: 2022-10-12

## 2022-08-05 RX ORDER — PREDNISONE 20 MG/1
20 TABLET ORAL DAILY
Qty: 10 TABLET | Refills: 0 | Status: SHIPPED | OUTPATIENT
Start: 2022-08-05 | End: 2022-08-15

## 2022-08-05 NOTE — PROGRESS NOTES
Assessment/Plan:    Acute on chronic neck pain  -patient is currently on Celexa which will interact with NSAIDs so we will avoid NSAIDs  -will start her on prednisone 20 mg to be taken in the morning   -will also start her on Percocet for moderate-to-severe pain  -the  web site was queried and did not show misuse  -patient was counseled to cut down on excessive exercise and to rest a little   -she may use moist heat  -if symptoms continue, we will order repeat imaging of the cervical spine    Cervical radiculopathy   -MRI of the cervical spine done on August 31st, 2017 showed a small right paracentral disc protrusion at C5-C6 and mild left uncinate joint hypertrophic degenerative change at C6-C7   - will start patient on prednisone   -will order repeat MRI to monitor for worsening pathology if symptoms do not resolve in 1 week or if they worsen  -she was counseled to avoid excessive exercise    Muscle spasm  -will refill patient's Robaxin  -she was counseled to take medication at nighttime and that if she should take it during the day, she should avoid driving or operating heavy machinery to avoid daytime dizziness or  falls     Diagnoses and all orders for this visit:    Acute neck pain  -     predniSONE 20 mg tablet; Take 1 tablet (20 mg total) by mouth daily for 10 days  -     HYDROcodone-acetaminophen (Norco) 5-325 mg per tablet; Take 1 tablet by mouth every 6 (six) hours as needed for pain Max Daily Amount: 4 tablets    Chronic neck pain  -     HYDROcodone-acetaminophen (Norco) 5-325 mg per tablet; Take 1 tablet by mouth every 6 (six) hours as needed for pain Max Daily Amount: 4 tablets    Cervical radiculopathy  -     predniSONE 20 mg tablet; Take 1 tablet (20 mg total) by mouth daily for 10 days  -     HYDROcodone-acetaminophen (Norco) 5-325 mg per tablet;  Take 1 tablet by mouth every 6 (six) hours as needed for pain Max Daily Amount: 4 tablets    Muscle spasm of back  -     methocarbamol (ROBAXIN) 500 mg tablet; Take 1 tablet (500 mg total) by mouth 3 (three) times a day  -     predniSONE 20 mg tablet; Take 1 tablet (20 mg total) by mouth daily for 10 days  -     HYDROcodone-acetaminophen (Norco) 5-325 mg per tablet; Take 1 tablet by mouth every 6 (six) hours as needed for pain Max Daily Amount: 4 tablets    Other orders  -     Sodium Fluoride 5000 Sensitive 1 1-5 % GEL; Use as directed        Depression Screening and Follow-up Plan: Patient was screened for depression during today's encounter  They screened negative with a PHQ-2 score of 0  Subjective:      Patient ID: Marlena Toney is a 63 y o  female  HPI  Pt presents with complaints of pain in the back of the neck and the left side as well as left shoulder  She states that the pain is tingling and burning and graded at 7/10 and 9/10 at the worst   She admits to history of a neck condition which includes disc herniation and issues with C5-C6 and C6-C7  Has had neck injections with pain management  States that she has some spinal stenosis and herniation  She denies any history of trauma, fall or motor vehicle accident prior to onset of pain but states that states that she slept weird and woke up with pain in the neck and pain down the left arm  Has tried to take Advil and is still having tingling down the left arm  Has been taking the meloxicam she has and it is helping a little  She has been having some mus spasm in the upper back around the left scapular  Took some old robaxin and it did not help  Pain is worse at night time  She is a physical therapist   She states that she has been exercising quite a bit and admits that she has not been resting well enough since her symptoms started      The following portions of the patient's history were reviewed and updated as appropriate:   She  has a past medical history of Anxiety, Arthritis, Family history of anesthesia complication, Herniated disc, cervical, Hot flashes (5/8/2017), Lipids abnormal, Malaise and fatigue (2012), Menopausal symptoms (2016), PONV (postoperative nausea and vomiting), Shoulder joint pain (2018), Spinal stenosis, Strain of supraspinatus muscle or tendon (2018), and Vaginal delivery  She   Patient Active Problem List    Diagnosis Date Noted    Atrophic vaginitis 2022    Current use of estrogen therapy 2022    Bartholin's gland abscess 2022    Dermatochalasis 2022    Primary insomnia 2018    Lumbosacral spondylosis without myelopathy 2018    Anxiety 2018    Lipids abnormal 2018    Cervical radiculopathy 2017    Cervical spondylosis without myelopathy 2017    Chronic neck pain 2017    Dense breasts 2017     She  has a past surgical history that includes Hysterectomy (); Rotator cuff repair; Epidural block injection; Septoplasty; Exploratory laparotomy (); Hernia repair (Right);  section (); Shoulder surgery (Left); Brow lift; Blepharoplasty (Bilateral); and BLEPHAROPLASTY (Bilateral, 2022)  Her family history includes Blindness in her brother; Diabetes in her mother; Heart attack in her brother; Lung cancer (age of onset: 77) in her father; Lupus in her mother; No Known Problems in her maternal grandfather, maternal grandmother, paternal grandfather, paternal grandmother, son, and son; Other in her mother; Prostate cancer in her brother; Stroke in her brother  She  reports that she has never smoked  She has never used smokeless tobacco  She reports current alcohol use of about 1 0 - 2 0 standard drink of alcohol per week  She reports that she does not use drugs    Current Outpatient Medications   Medication Sig Dispense Refill    busPIRone (BUSPAR) 15 mg tablet Take 1 tablet (15 mg total) by mouth 2 (two) times a day 180 tablet 1    citalopram (CeleXA) 40 mg tablet Take 1 tablet (40 mg total) by mouth daily 90 tablet 1    estradiol (ESTRACE) 0 1 mg/g vaginal cream 1 g HS twice weekly 42 5 g 0    fluticasone (FLONASE) 50 mcg/act nasal spray 2 sprays into each nostril daily (Patient taking differently: into each nostril as needed) 1 Bottle 11    HYDROcodone-acetaminophen (Norco) 5-325 mg per tablet Take 1 tablet by mouth every 6 (six) hours as needed for pain Max Daily Amount: 4 tablets 20 tablet 0    meloxicam (MOBIC) 7 5 mg tablet Take 7 5 mg by mouth daily      methocarbamol (ROBAXIN) 500 mg tablet Take 1 tablet (500 mg total) by mouth 3 (three) times a day 30 tablet 0    predniSONE 20 mg tablet Take 1 tablet (20 mg total) by mouth daily for 10 days 10 tablet 0    PreviDent 5000 Sensitive 1 1-5 % PSTE As directed      Sulfacetamide Sodium, Acne, 10 % LOTN APPLY TOPICALLY TO FACE EVERY MORNING      tretinoin (REFISSA) 0 05 % cream APPLY PEA SIZED AMOUNT TO ENTIRE FACE EVERY NIGHT AT BEDTIME 1 HOUR AFTER WASHING FACE      loratadine (CLARITIN) 10 mg tablet Take 10 mg by mouth as needed 2/3/22 Not actively taking  (Patient not taking: No sig reported)      Sodium Fluoride 5000 Sensitive 1 1-5 % GEL Use as directed      zolpidem (AMBIEN) 5 mg tablet Take 1 tablet (5 mg total) by mouth daily (Patient not taking: No sig reported) 30 tablet 0     No current facility-administered medications for this visit       Current Outpatient Medications on File Prior to Visit   Medication Sig    busPIRone (BUSPAR) 15 mg tablet Take 1 tablet (15 mg total) by mouth 2 (two) times a day    citalopram (CeleXA) 40 mg tablet Take 1 tablet (40 mg total) by mouth daily    estradiol (ESTRACE) 0 1 mg/g vaginal cream 1 g HS twice weekly    fluticasone (FLONASE) 50 mcg/act nasal spray 2 sprays into each nostril daily (Patient taking differently: into each nostril as needed)    meloxicam (MOBIC) 7 5 mg tablet Take 7 5 mg by mouth daily    PreviDent 5000 Sensitive 1 1-5 % PSTE As directed    Sulfacetamide Sodium, Acne, 10 % LOTN APPLY TOPICALLY TO FACE EVERY MORNING    tretinoin (REFISSA) 0 05 % cream APPLY PEA SIZED AMOUNT TO ENTIRE FACE EVERY NIGHT AT BEDTIME 1 HOUR AFTER WASHING FACE    loratadine (CLARITIN) 10 mg tablet Take 10 mg by mouth as needed 2/3/22 Not actively taking  (Patient not taking: No sig reported)    Sodium Fluoride 5000 Sensitive 1 1-5 % GEL Use as directed    zolpidem (AMBIEN) 5 mg tablet Take 1 tablet (5 mg total) by mouth daily (Patient not taking: No sig reported)     No current facility-administered medications on file prior to visit  She is allergic to epinephrine, erythromycin, levofloxacin, iv dye [iodinated diagnostic agents], procaine, ampicillin, moxifloxacin, and penicillins       Review of Systems   Constitutional: Negative for activity change, chills, fatigue, fever and unexpected weight change  HENT: Negative for ear pain, postnasal drip, rhinorrhea, sinus pressure and sore throat  Eyes: Negative for pain  Respiratory: Negative for cough, choking, chest tightness, shortness of breath and wheezing  Cardiovascular: Negative for chest pain, palpitations and leg swelling  Gastrointestinal: Negative for abdominal pain, constipation, diarrhea, nausea and vomiting  Genitourinary: Negative for dysuria and hematuria  Musculoskeletal: Positive for back pain (Neck and back pain with muscle spasm) and neck pain  Negative for arthralgias, gait problem, joint swelling, myalgias and neck stiffness  Skin: Negative for pallor and rash  Neurological: Positive for numbness  Negative for dizziness, tremors, seizures, syncope, light-headedness and headaches  Hematological: Negative for adenopathy  Psychiatric/Behavioral: Negative for behavioral problems           Objective:      /78 (BP Location: Left arm, Patient Position: Sitting, Cuff Size: Adult)   Pulse 74   Temp 98 2 °F (36 8 °C) (Temporal)   Ht 5' 0 25" (1 53 m)   Wt 45 8 kg (101 lb)   LMP  (LMP Unknown) Comment: Partial hysterectomy per pt  SpO2 98%   BMI 19 56 kg/m²          Physical Exam  Constitutional:       General: She is not in acute distress  Appearance: She is well-developed  She is not diaphoretic  HENT:      Head: Normocephalic and atraumatic  Right Ear: External ear normal       Left Ear: External ear normal       Nose: Nose normal       Mouth/Throat:      Mouth: Mucous membranes are dry  Pharynx: Posterior oropharyngeal erythema (dry mucous membranes with mild oropharyngeal erythema) present  No oropharyngeal exudate  Eyes:      General: No scleral icterus  Right eye: No discharge  Left eye: No discharge  Conjunctiva/sclera: Conjunctivae normal       Pupils: Pupils are equal, round, and reactive to light  Neck:      Thyroid: No thyromegaly  Vascular: No JVD  Trachea: No tracheal deviation  Cardiovascular:      Rate and Rhythm: Normal rate and regular rhythm  Heart sounds: Normal heart sounds  No murmur heard  No friction rub  No gallop  Pulmonary:      Effort: Pulmonary effort is normal  No respiratory distress  Breath sounds: Normal breath sounds  No wheezing or rales  Chest:      Chest wall: No tenderness  Abdominal:      General: Bowel sounds are normal  There is no distension  Palpations: Abdomen is soft  There is no mass  Tenderness: There is no abdominal tenderness  There is no guarding or rebound  Musculoskeletal:         General: No deformity  Cervical back: Neck supple  Spasms present  Pain with movement and muscular tenderness (in the posterior neck and upper back raghu on the left trapezius area) present  Decreased range of motion  Thoracic back: Spasms and tenderness present  Lymphadenopathy:      Cervical: No cervical adenopathy  Skin:     General: Skin is warm and dry  Coloration: Skin is not pale  Findings: No erythema or rash  Neurological:      Mental Status: She is alert and oriented to person, place, and time  Cranial Nerves:  No cranial nerve deficit  Motor: No abnormal muscle tone  Coordination: Coordination normal       Deep Tendon Reflexes: Reflexes are normal and symmetric     Psychiatric:         Behavior: Behavior normal            Office Visit on 04/04/2022   Component Date Value Ref Range Status    Genital Culture 04/04/2022 4+ Growth of Escherichia coli (A)  Final    Genital Culture 04/04/2022 Few Colonies of    Final    Mixed Vaginal Gloria

## 2022-08-16 ENCOUNTER — TELEPHONE (OUTPATIENT)
Dept: INTERNAL MEDICINE CLINIC | Facility: OTHER | Age: 62
End: 2022-08-16

## 2022-08-16 DIAGNOSIS — M54.12 CERVICAL RADICULOPATHY: Primary | ICD-10-CM

## 2022-08-16 NOTE — TELEPHONE ENCOUNTER
You are still having neck pain and left arm  With numbness, tingling and weakness since before her last visit 8/5/22  This has been going on for 3-4 weeks  She is able to turn her neck more since her last visit but the tingling is still there and the neck pain is still there  Efrem Walker completed her prednisone treatment  Please advise next steps? Will call [facility] to see if she could get injections, as she had them previously with them

## 2022-08-17 ENCOUNTER — TELEPHONE (OUTPATIENT)
Dept: INTERNAL MEDICINE CLINIC | Facility: OTHER | Age: 62
End: 2022-08-17

## 2022-08-17 RX ORDER — MELOXICAM 15 MG/1
15 TABLET ORAL DAILY
Qty: 30 TABLET | Refills: 0 | Status: SHIPPED | OUTPATIENT
Start: 2022-08-17 | End: 2022-10-12

## 2022-08-17 NOTE — TELEPHONE ENCOUNTER
Unfortunately unfortunately I did not evaluate patient for her symptoms, she was seen by Dr Kael Oshea  I can prescribe Mobic which is an anti-inflammatory  She could also look into pain management through [address] and [facility]

## 2022-08-17 NOTE — TELEPHONE ENCOUNTER
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

## 2022-08-17 NOTE — TELEPHONE ENCOUNTER
We typically try to avoid this medication but it is okay to take for short period of time  I would recommend 2 weeks max

## 2022-08-17 NOTE — TELEPHONE ENCOUNTER
Patient saw Dr Dante Barnett, according to her note she would order x-ray if pain persists  I would recommend PT and also pain management  I will place orders, please call and notify  She may continue muscle relaxer

## 2022-08-17 NOTE — TELEPHONE ENCOUNTER
Pt called back  She wanted to make sure it's ok for her to take Mobic w Buspirone & Citalopram since she was told previously that this may cause GI bleeds  Also, she is ok to wait for Dr Migdalia Fatima to return in case she has any additional instructions

## 2022-08-23 DIAGNOSIS — N95.2 ATROPHIC VAGINITIS: ICD-10-CM

## 2022-08-23 RX ORDER — ESTRADIOL 0.1 MG/G
CREAM VAGINAL
Qty: 42 G | Refills: 0 | Status: SHIPPED | OUTPATIENT
Start: 2022-08-23

## 2022-09-09 DIAGNOSIS — G47.09 OTHER INSOMNIA: ICD-10-CM

## 2022-09-09 DIAGNOSIS — F41.1 GENERALIZED ANXIETY DISORDER: ICD-10-CM

## 2022-09-09 RX ORDER — BUSPIRONE HYDROCHLORIDE 15 MG/1
15 TABLET ORAL 2 TIMES DAILY
Qty: 180 TABLET | Refills: 1 | Status: SHIPPED | OUTPATIENT
Start: 2022-09-09 | End: 2022-12-08

## 2022-10-12 ENCOUNTER — OFFICE VISIT (OUTPATIENT)
Dept: INTERNAL MEDICINE CLINIC | Facility: OTHER | Age: 62
End: 2022-10-12
Payer: COMMERCIAL

## 2022-10-12 VITALS
BODY MASS INDEX: 19.12 KG/M2 | DIASTOLIC BLOOD PRESSURE: 78 MMHG | OXYGEN SATURATION: 99 % | TEMPERATURE: 98.6 F | HEIGHT: 60 IN | SYSTOLIC BLOOD PRESSURE: 116 MMHG | WEIGHT: 97.4 LBS | HEART RATE: 82 BPM

## 2022-10-12 DIAGNOSIS — F51.01 PRIMARY INSOMNIA: ICD-10-CM

## 2022-10-12 DIAGNOSIS — E78.89 LIPIDS ABNORMAL: ICD-10-CM

## 2022-10-12 DIAGNOSIS — Z13.228 SCREENING FOR METABOLIC DISORDER: ICD-10-CM

## 2022-10-12 DIAGNOSIS — E55.9 VITAMIN D DEFICIENCY: ICD-10-CM

## 2022-10-12 DIAGNOSIS — Z78.0 POST-MENOPAUSAL: Primary | ICD-10-CM

## 2022-10-12 DIAGNOSIS — M47.812 CERVICAL SPONDYLOSIS WITHOUT MYELOPATHY: ICD-10-CM

## 2022-10-12 DIAGNOSIS — F41.9 ANXIETY: ICD-10-CM

## 2022-10-12 DIAGNOSIS — E46 PROTEIN-CALORIE MALNUTRITION, UNSPECIFIED SEVERITY (HCC): ICD-10-CM

## 2022-10-12 PROCEDURE — 99214 OFFICE O/P EST MOD 30 MIN: CPT | Performed by: NURSE PRACTITIONER

## 2022-10-12 RX ORDER — TRAMADOL HYDROCHLORIDE 50 MG/1
TABLET ORAL
COMMUNITY
Start: 2022-09-30

## 2022-10-12 NOTE — ASSESSMENT & PLAN NOTE
Controlled  Patient will continue on Celexa 40mg,  Patient uses Xanax very sparingly  Continue with BuSpar to 15 mg tablet b i d  Follow up in 6-8 months or sooner

## 2022-10-12 NOTE — PROGRESS NOTES
Assessment/Plan:    Cervical spondylosis without myelopathy  Continue to follow with pain management  Anxiety  Controlled  Patient will continue on Celexa 40mg,  Patient uses Xanax very sparingly  Continue with BuSpar to 15 mg tablet b i d  Follow up in 6-8 months or sooner  Lipids abnormal  Will get updated lipid panel and fasting blood work  Recommend healthy lifestyle choices for your cholesterol  Low fat/low cholesterol diet  Limit/avoid red meat  Eat more lean meat - chicken breast, ground turkey, fish  Exercise 30 mins at least 5 times a week as tolerated  Primary insomnia  Controlled on Ambien as needed  Diagnoses and all orders for this visit:    Post-menopausal  -     CBC and differential  -     Comprehensive metabolic panel; Future  -     Lipid panel  -     Vitamin D 25 hydroxy; Future  -     DXA bone density spine hip and pelvis; Future    Screening for metabolic disorder  -     CBC and differential  -     Comprehensive metabolic panel; Future  -     Lipid panel  -     Vitamin D 25 hydroxy; Future    Vitamin D deficiency  -     Vitamin D 25 hydroxy; Future    Protein-calorie malnutrition, unspecified severity ([address] )    Cervical spondylosis without myelopathy    Anxiety    Lipids abnormal    Primary insomnia    Other orders  -     traMADol (ULTRAM) 50 mg tablet; TAKE 1 TABLET 3 TIMES A DAY BY ORAL ROUTE AS NEEDED  Subjective:      Patient ID: Jaspreet Dowd is a 63 y o  female  Patient presents today to follow-up on anxiety, and insomnia  She reports no new concerns  Anxiety- currently well controlled on Celexa and buspar  Xanax as needed has been used sparingly  Insomnia- Ambien as needed, has not really been using it because she has been sleeping well    Patient reports that she Injured neck C7 3mo ago - has been seeing [facility] for pain management  Has not resolved completely even after epidural           Anxiety  Presents for follow-up visit  Symptoms include excessive worry and insomnia  Patient reports no chest pain, dizziness, nausea, nervous/anxious behavior, palpitations, shortness of breath or suicidal ideas  Symptoms occur rarely  The severity of symptoms is mild  The patient sleeps 4 hours per night  The quality of sleep is non-restorative  Compliance with medications is %  The following portions of the patient's history were reviewed and updated as appropriate: allergies, current medications, past family history, past medical history, past social history, past surgical history and problem list     Review of Systems   Constitutional: Negative for activity change, appetite change, chills, diaphoresis and fever  HENT: Negative for congestion, ear discharge, ear pain, postnasal drip, rhinorrhea, sinus pressure, sinus pain and sore throat  Eyes: Negative for pain, discharge, itching and visual disturbance  Respiratory: Negative for cough, chest tightness, shortness of breath and wheezing  Cardiovascular: Negative for chest pain, palpitations and leg swelling  Gastrointestinal: Negative for abdominal pain, constipation, diarrhea, nausea and vomiting  Endocrine: Negative for polydipsia, polyphagia and polyuria  Genitourinary: Negative for difficulty urinating, dysuria and urgency  Musculoskeletal: Positive for neck pain  Negative for arthralgias and back pain  Skin: Negative for rash and wound  Neurological: Negative for dizziness, weakness, numbness and headaches  Psychiatric/Behavioral: Negative for dysphoric mood and suicidal ideas  The patient has insomnia  The patient is not nervous/anxious            Past Medical History:   Diagnosis Date   • Anxiety    • Arthritis     Last Assessed:  4/17/15   • Family history of anesthesia complication     7/4/53 Pt did report her mother "having difficulty coming out of anesthesia" - no specific issues mentioned   • Herniated disc, cervical    • Hot flashes 5/8/2017   • Lipids abnormal     Last Assessed:  16   • Malaise and fatigue 2012   • Menopausal symptoms 2016    Last Assessment & Plan:  Endorses hot flashes, night sweats, mood lability, difficulty sleeping, vaginal dryness and low libido  Discussed that these symptoms appear consistent with menopause  We discussed options for management including lifestyle changes, OTC medications, moisturizers and lubricants, such as coconut oil   We discussed pharmacologic treatments such as SSRI's, vaginal estrogen and    • PONV (postoperative nausea and vomiting)    • Shoulder joint pain 2018   • Spinal stenosis    • Strain of supraspinatus muscle or tendon 2018   • Vaginal delivery     Pt reports one V back birth after          Current Outpatient Medications:   •  busPIRone (BUSPAR) 15 mg tablet, Take 1 tablet (15 mg total) by mouth 2 (two) times a day, Disp: 180 tablet, Rfl: 1  •  citalopram (CeleXA) 40 mg tablet, Take 1 tablet (40 mg total) by mouth daily, Disp: 90 tablet, Rfl: 1  •  estradiol (ESTRACE) 0 1 mg/g vaginal cream, INSERT 1 GRAM AT BEDTIME TWICE WEEKLY, Disp: 42 g, Rfl: 0  •  fluticasone (FLONASE) 50 mcg/act nasal spray, 2 sprays into each nostril daily (Patient taking differently: into each nostril as needed), Disp: 1 Bottle, Rfl: 11  •  methocarbamol (ROBAXIN) 500 mg tablet, Take 1 tablet (500 mg total) by mouth 3 (three) times a day (Patient taking differently: Take 750 mg by mouth 4 (four) times a day), Disp: 30 tablet, Rfl: 0  •  PreviDent 5000 Sensitive 1 1-5 % PSTE, As directed, Disp: , Rfl:   •  Sulfacetamide Sodium, Acne, 10 % LOTN, APPLY TOPICALLY TO FACE EVERY MORNING, Disp: , Rfl:   •  traMADol (ULTRAM) 50 mg tablet, TAKE 1 TABLET 3 TIMES A DAY BY ORAL ROUTE AS NEEDED , Disp: , Rfl:   •  tretinoin (REFISSA) 0 05 % cream, APPLY PEA SIZED AMOUNT TO ENTIRE FACE EVERY NIGHT AT BEDTIME 1 HOUR AFTER WASHING FACE, Disp: , Rfl:     Allergies   Allergen Reactions   • Epinephrine Other (See Comments)     Hyperventilate- flushed and palpitations   • Erythromycin Other (See Comments)     Gave pt cdiff   • Levofloxacin Other (See Comments)     Lymphadenopathy - rash and hives   • Iv Dye [Iodinated Diagnostic Agents] Other (See Comments)     Hyperventilated - flushed - "had to reverse it" -had palpitations    • Procaine Other (See Comments)     Annotation - [id number]: When used with Epi- hyperventilated, flushed and palpitations      • Ampicillin Other (See Comments)     RASH   • Moxifloxacin Rash     Swelling in lymphnodes   • Penicillins Rash       Social History   Past Surgical History:   Procedure Laterality Date   • BLEPHAROPLASTY Bilateral     upper bleph bilateral   • BLEPHAROPLASTY Bilateral 2/11/2022    Procedure: BLEPHAROPLASTY LOWER;  Surgeon: Leslie Lloyd MD;  Location: [address] [place] [address];  Service: Plastics   • BROW LIFT      Tacks placed in and dissolve   • [address]    x1   • EPIDURAL BLOCK INJECTION      back   • EXPLORATORY LAPAROTOMY  1979   • HERNIA REPAIR Right     Last Assessed:  4/17/15- pt reports right femoral   • HYSTERECTOMY  2000    Partial   • ROTATOR CUFF REPAIR      Last Assessed:  4/17/15   • SEPTOPLASTY     • SHOULDER SURGERY Left     Pt reports left rotator cuff surgery      Family History   Problem Relation Age of Onset   • Lupus Mother    • Other Mother         Rheumatic disease   • Diabetes Mother    • Lung cancer Father 77        smoker   • Blindness Brother    • Heart attack Brother    • Stroke Brother         half brother   • Prostate cancer Brother    • No Known Problems Maternal Grandmother    • No Known Problems Maternal Grandfather    • No Known Problems Paternal Grandmother    • No Known Problems Paternal Grandfather    • No Known Problems Son    • No Known Problems Son        Objective:  /78 (BP Location: Left arm, Patient Position: Sitting, Cuff Size: Adult)   Pulse 82   Temp 98 6 °F (37 °C) (Temporal)   Ht 5' 0 25" (1 53 m)   Wt 44 2 kg (97 lb 6 4 oz)   LMP  (LMP Unknown) Comment: Partial hysterectomy per pt  SpO2 99% Comment: ra  BMI 18 86 kg/m²     No results found for this or any previous visit (from the past 1344 hour(s))  Physical Exam  Constitutional:       General: She is not in acute distress  Appearance: She is well-developed  She is not diaphoretic  HENT:      Head: Normocephalic and atraumatic  Right Ear: External ear normal       Left Ear: External ear normal       Nose: Nose normal       Mouth/Throat:      Pharynx: No oropharyngeal exudate  Eyes:      General:         Right eye: No discharge  Left eye: No discharge  Conjunctiva/sclera: Conjunctivae normal       Pupils: Pupils are equal, round, and reactive to light  Neck:      Thyroid: No thyromegaly  Cardiovascular:      Rate and Rhythm: Normal rate and regular rhythm  Heart sounds: Normal heart sounds  No murmur heard  No friction rub  No gallop  Pulmonary:      Effort: Pulmonary effort is normal  No respiratory distress  Breath sounds: Normal breath sounds  No stridor  No wheezing or rales  Abdominal:      General: Bowel sounds are normal  There is no distension  Palpations: Abdomen is soft  Tenderness: There is no abdominal tenderness  Musculoskeletal:      Cervical back: Normal range of motion and neck supple  Lymphadenopathy:      Cervical: No cervical adenopathy  Skin:     General: Skin is warm and dry  Findings: No erythema or rash  Neurological:      Mental Status: She is alert and oriented to person, place, and time  Psychiatric:         Behavior: Behavior normal          Thought Content:  Thought content normal          Judgment: Judgment normal

## 2022-11-09 ENCOUNTER — TELEPHONE (OUTPATIENT)
Dept: INTERNAL MEDICINE CLINIC | Facility: OTHER | Age: 62
End: 2022-11-09

## 2022-11-09 NOTE — TELEPHONE ENCOUNTER
Patient came into office this morning regarding a bill she received for her Office Visit on 10/12/2022 with Adolfo Man  States she called her insurance company and they told her it was billed as a sick visit instead of a wellness/6 month follow up visit  Stated she spoke to someone in office and was told to leave information       Patient's call back number [phone]

## 2022-11-14 DIAGNOSIS — F41.9 ANXIETY: ICD-10-CM

## 2022-11-14 RX ORDER — CITALOPRAM 40 MG/1
40 TABLET ORAL DAILY
Qty: 90 TABLET | Refills: 1 | Status: SHIPPED | OUTPATIENT
Start: 2022-11-14

## 2023-01-10 ENCOUNTER — TELEPHONE (OUTPATIENT)
Dept: OBGYN CLINIC | Facility: CLINIC | Age: 63
End: 2023-01-10

## 2023-01-10 DIAGNOSIS — N95.2 ATROPHIC VAGINITIS: ICD-10-CM

## 2023-01-10 RX ORDER — ESTRADIOL 0.1 MG/G
CREAM VAGINAL
Qty: 42 G | Refills: 0 | Status: SHIPPED | OUTPATIENT
Start: 2023-01-10

## 2023-01-10 NOTE — TELEPHONE ENCOUNTER
Patient need one refill to get her to her annual on 2/21/23  She originally had a appt for 1/13/23 but too early for yearly  We will refill the year at annual visit

## 2023-01-10 NOTE — TELEPHONE ENCOUNTER
Actually her last yearly was 2/11/2021  Since she already has appointment scheduled I will place the order

## 2023-03-21 ENCOUNTER — ANNUAL EXAM (OUTPATIENT)
Dept: OBGYN CLINIC | Facility: CLINIC | Age: 63
End: 2023-03-21

## 2023-03-21 VITALS
HEIGHT: 60 IN | WEIGHT: 101.2 LBS | DIASTOLIC BLOOD PRESSURE: 70 MMHG | BODY MASS INDEX: 19.87 KG/M2 | SYSTOLIC BLOOD PRESSURE: 120 MMHG

## 2023-03-21 DIAGNOSIS — Z12.31 ENCOUNTER FOR SCREENING MAMMOGRAM FOR MALIGNANT NEOPLASM OF BREAST: ICD-10-CM

## 2023-03-21 DIAGNOSIS — Z01.419 ENCOUNTER FOR GYNECOLOGICAL EXAMINATION (GENERAL) (ROUTINE) WITHOUT ABNORMAL FINDINGS: Primary | ICD-10-CM

## 2023-03-21 RX ORDER — SODIUM FLUORIDE 6 MG/ML
PASTE, DENTIFRICE DENTAL
COMMUNITY
Start: 2023-01-25

## 2023-03-24 NOTE — PROGRESS NOTES
Stiven Asper   1960    CC:  Yearly exam    S:  63 y o  female here for yearly exam  She is postmenopausal and has had no vaginal bleeding  She denies vaginal discharge, itching, odor or dryness  She had a problem with recurrent Bartholin's abscesses in 2022 for which she saw Dr Jac Castro  She has otherwise been well  Sexual activity: She is sexually active without pain, bleeding or dryness  Last Pap: s/p hysterectomy  Last Mammo: 9/2/2022 - BIRAD-1  Last Colonoscopy: age 48 - 10 years  Last DEXA: scheduled (PCP ordered)    We reviewed ASCCP guidelines for Pap testing         Current Outpatient Medications:   •  citalopram (CeleXA) 40 mg tablet, Take 1 tablet (40 mg total) by mouth daily, Disp: 90 tablet, Rfl: 1  •  estradiol (ESTRACE) 0 1 mg/g vaginal cream, INSERT 1 GRAM AT BEDTIME TWICE WEEKLY, Disp: 42 g, Rfl: 0  •  fluticasone (FLONASE) 50 mcg/act nasal spray, 2 sprays into each nostril daily (Patient taking differently: into each nostril as needed), Disp: 1 Bottle, Rfl: 11  •  PreviDent 5000 Booster Plus 1 1 % PSTE, As directed, Disp: , Rfl:   •  PreviDent 5000 Sensitive 1 1-5 % PSTE, As directed, Disp: , Rfl:   •  Sulfacetamide Sodium, Acne, 10 % LOTN, APPLY TOPICALLY TO FACE EVERY MORNING, Disp: , Rfl:   •  traMADol (ULTRAM) 50 mg tablet, TAKE 1 TABLET 3 TIMES A DAY BY ORAL ROUTE AS NEEDED , Disp: , Rfl:   •  tretinoin (REFISSA) 0 05 % cream, APPLY PEA SIZED AMOUNT TO ENTIRE FACE EVERY NIGHT AT BEDTIME 1 HOUR AFTER WASHING FACE, Disp: , Rfl:   •  busPIRone (BUSPAR) 15 mg tablet, Take 1 tablet (15 mg total) by mouth 2 (two) times a day, Disp: 180 tablet, Rfl: 1  •  methocarbamol (ROBAXIN) 500 mg tablet, Take 1 tablet (500 mg total) by mouth 3 (three) times a day (Patient taking differently: Take 750 mg by mouth 4 (four) times a day), Disp: 30 tablet, Rfl: 0  Social History     Socioeconomic History   • Marital status: Single     Spouse name: Not on file   • Number of children: 2   • Years of education: Not on file   • Highest education level: Not on file   Occupational History   • Occupation: Physical therapist   Tobacco Use   • Smoking status: Never   • Smokeless tobacco: Never   • Tobacco comments:     Denies   Vaping Use   • Vaping Use: Never used   Substance and Sexual Activity   • Alcohol use: Yes     Alcohol/week: 1 0 - 2 0 standard drink     Types: 1 - 2 Standard drinks or equivalent per week     Comment: Socially- 4x/week   • Drug use: No     Comment: Denies    • Sexual activity: Yes     Partners: Male     Birth control/protection: Surgical     Comment: Denies any chest pain or shortness of breath with activity    Other Topics Concern   • Not on file   Social History Narrative    Caffeine use:  She admits to consuming caffeine via coffee (1 serving per day)    Exercises regularly:  Primary form of exercise is aerobics and weightlifting    Frequency is 3-5 times per week    Per [employer]:       Social Determinants of Health     Financial Resource Strain: Not on file   Food Insecurity: Not on file   Transportation Needs: Not on file   Physical Activity: Not on file   Stress: Not on file   Social Connections: Not on file   Intimate Partner Violence: Not on file   Housing Stability: Not on file     Family History   Problem Relation Age of Onset   • Lupus Mother    • Other Mother         Rheumatic disease   • Diabetes Mother    • Lung cancer Father 77        smoker   • Blindness Brother    • Heart attack Brother    • Stroke Brother         half brother   • Prostate cancer Brother    • No Known Problems Maternal Grandmother    • No Known Problems Maternal Grandfather    • No Known Problems Paternal Grandmother    • No Known Problems Paternal Grandfather    • No Known Problems Son    • No Known Problems Son      Past Medical History:   Diagnosis Date   • Anxiety    • Arthritis     Last Assessed:  4/17/15   • Family history of anesthesia complication     3/5/57 Pt did report her mother "having difficulty coming out of anesthesia" - no specific issues mentioned   • Herniated disc, cervical    • Hot flashes 2017   • Lipids abnormal     Last Assessed:  16   • Malaise and fatigue 2012   • Menopausal symptoms 2016    Last Assessment & Plan:  Endorses hot flashes, night sweats, mood lability, difficulty sleeping, vaginal dryness and low libido  Discussed that these symptoms appear consistent with menopause  We discussed options for management including lifestyle changes, OTC medications, moisturizers and lubricants, such as coconut oil  We discussed pharmacologic treatments such as SSRI's, vaginal estrogen and    • PONV (postoperative nausea and vomiting)    • Shoulder joint pain 2018   • Spinal stenosis    • Strain of supraspinatus muscle or tendon 2018   • Vaginal delivery     Pt reports one V back birth after         Review of Systems   Respiratory: Negative  Cardiovascular: Negative  Gastrointestinal: Negative for constipation and diarrhea  Genitourinary: Negative for difficulty urinating, pelvic pain, vaginal bleeding, vaginal discharge, itching or odor  O:  Blood pressure 120/70, height 5' (1 524 m), weight 45 9 kg (101 lb 3 2 oz)  Patient appears well and is not in distress  Neck is supple without masses  Breasts are symmetrical without mass, tenderness, nipple discharge, skin changes or adenopathy  Abdomen is soft and nontender without masses  External genitals are normal without lesions or rashes  Urethral meatus and urethra are normal  Bladder is normal to palpation  Vagina is normal without discharge or bleeding  Cervix is surgically absent  Uterus is surgically absent  Adnexa are normal, nontender, without palpable mass  A:   Yearly exam      P:   Pap no longer indicated  Mammo slip given   Colonoscopy due next year   DEXA scheduled    RTO one year for yearly exam or sooner as needed

## 2023-04-02 ENCOUNTER — AMB VIDEO VISIT (OUTPATIENT)
Dept: OTHER | Facility: HOSPITAL | Age: 63
End: 2023-04-02

## 2023-04-02 NOTE — CARE ANYWHERE EVISITS
Visit Summary for Chandrakant Zepeda - Gender: Female - Date of Birth: [id number]  Date: [id number] - Duration: 6 minutes  Patient: Chandrakant Zepeda  Provider: Rodell Hammans    Patient Contact Information  Address  [address]  [facility] [address]  [phone]    Visit Topics  UTI  [Added By: Self - 2023-04-02]    Triage Questions   What is your current physical address in the event of a medical emergency? Answer []  Are you allergic to any medications? Answer []  Are you now or could you be pregnant? Answer []  Do you have any immune system compromise or chronic lung   disease? Answer []  Do you have any vulnerable family members in the home (infant, pregnant, cancer, elderly)? Answer []     Conversation Transcripts  [0A][0A] [Notification] You are connected with Maria Eugenia Crum, Family Physician [0A][Notification] Saniya Landry is located in [place]  [address] has shared health history  Cindy Acosta  [0A]    Diagnosis  Acute cystitis with hematuria    Procedures  Value: [id number] Code: CPT-4 UNLISTED E&M SERVICE    Medications Prescribed    Macrobid      Frequency :   Patient Instructions : 1 capsule BID x 5 days  Refills : 0  Instructions to the Pharmacist : Substitutions allowed      Provider Notes  [0A][0A] Mode of communication: video[0A]Located in [place] , identity verified[0A]HPI: Patient reports symptoms of a UTI including: Burning, frequency and urgency of urination  No gross hematuria  No fever, back pain or severe abdominal pain  No vaginal   discharge  [0A]Symptoms started yesterday  [0A]   [0A]PMH: lbp[0A]Previous HX UTIs: yes, approx 5 years ago[0A]Meds: tramadol prn[0A]Allergies: bactrim, cephalosporins[0A]LMP: N/A[0A]PE[0A]Awake and alert in NAD, appears well[0A]No CVAT, no   SPT[0A]Assessment: Acute Cystitis with hematuria[0A]Plan:[0A]1  Medication as described below  [0A]2  Discussed options and precautions[0A]3  Recommend increased fluids, cranberry juice[0A]4   You may consider "using the over-the-counter medication called   phenazopyridine (the commercial name can be \"Azo\", \"Pyridium\" or \"Uristat\") for your urinary pain  Please note, this medication can cause urine to be red/orange in color and could discolor contact lenses - so be aware of that possibility  [0A]5  If you   have had blood in urine follow up with your PCP after treatment to confirm resolution of bleeding  [0A]Antibiotic indication: uncomplicated RFB[1O]SVKDIZZZOS chosen for the following reason: Clearance Offer - 1st line[0A]Follow-up:[0A]1  Follow up in 2-3 days if   not improving  Since you have had blood in the urine, please follow up with your PCP within a couple of weeks to confirm [address]  If a medication was prescribed and there are any questions or problems with the prescription, please call   [phone] for assistance  [0A]3  If your symptoms are worse in any way (i e  you develop fever, vomiting, back pain, difficulty urinating) or if your symptoms are not improving after being on antibiotics for 48 hours, then you will need to seek in   person care either with your primary care provider or at your closest urgent care clinic  [0A]4  Taking a probiotic (either in pill form or by eating yogurt that contains probiotics) while using antibiotics can help prevent some of the troublesome side   effects that antibiotics can sometimes cause  [0A]5  Please print a copy of this note and provide it to your regular doctor so it may be included in your medical record  [0A]Patient voiced understanding and agreement with plan  [0A]Please be sure to see   your PCP on an annual basis  [0A][0A]    Electronically signed by: Jaycee Velasco(NPI [id number])  "

## 2023-04-27 DIAGNOSIS — N95.2 ATROPHIC VAGINITIS: ICD-10-CM

## 2023-04-27 RX ORDER — ESTRADIOL 0.1 MG/G
CREAM VAGINAL
Qty: 42.5 G | Refills: 3 | Status: SHIPPED | OUTPATIENT
Start: 2023-04-27

## 2023-06-02 ENCOUNTER — RA CDI HCC (OUTPATIENT)
Dept: OTHER | Facility: HOSPITAL | Age: 63
End: 2023-06-02

## 2023-06-02 NOTE — PROGRESS NOTES
[facility]  coding opportunities          Chart Reviewed number of suggestions sent to Provider: 1   [id number]    Patients Insurance        Commercial Insurance: [employer]

## 2023-06-14 ENCOUNTER — HOSPITAL ENCOUNTER (OUTPATIENT)
Dept: RADIOLOGY | Age: 63
Discharge: HOME | End: 2023-06-14
Payer: COMMERCIAL

## 2023-06-14 DIAGNOSIS — Z78.0 POST-MENOPAUSAL: ICD-10-CM

## 2023-06-14 PROCEDURE — 77080 DXA BONE DENSITY AXIAL: CPT

## 2023-06-19 ENCOUNTER — TELEPHONE (OUTPATIENT)
Dept: INTERNAL MEDICINE CLINIC | Facility: OTHER | Age: 63
End: 2023-06-19

## 2023-06-19 NOTE — TELEPHONE ENCOUNTER
Patient calling because she got her [employer] message about her DXA results and she had some further questions that she wanted to discuss

## 2023-06-20 PROBLEM — M85.89 OSTEOPENIA OF MULTIPLE SITES: Status: ACTIVE | Noted: 2023-06-20

## 2023-06-26 NOTE — TELEPHONE ENCOUNTER
Patient states that she read your message but still has questions since there are some inconsistencies with regards to what she was told by the dexa scan technician  Patient would like a call back to discuss

## 2023-09-14 ENCOUNTER — TELEPHONE (OUTPATIENT)
Dept: INTERNAL MEDICINE CLINIC | Facility: OTHER | Age: 63
End: 2023-09-14

## 2023-09-14 NOTE — TELEPHONE ENCOUNTER
Hi, my name is Wilfredo Coronado, Birth date July 20th, 1960. I have a physical Wellness appointment on November 1st at 7:15, but I wanted to schedule it and move it up a little bit. I think it was originally in October, but then it got moved based on availability. So if you could give me a call back [phone]. And the other question I had was I think I need annual blood work. So I'd like to have that done before I come in for my Wellness, so baseline thyroid and glucose test and that kind of stuff. So those two things and if you could give me a call back at [phone]. Thank you    I called and LEFT MESSAGE ON MACHINE to have patient to call back to reschedule her appointment for her physical.  I did state to her that we can't order the blood work for her due to the insurance may not cover it if it is done too soon. Her last lab work was 11/02/2022 done [facility].

## 2023-10-25 ENCOUNTER — OFFICE VISIT (OUTPATIENT)
Age: 63
End: 2023-10-25
Payer: COMMERCIAL

## 2023-10-25 VITALS
DIASTOLIC BLOOD PRESSURE: 68 MMHG | SYSTOLIC BLOOD PRESSURE: 108 MMHG | HEART RATE: 74 BPM | OXYGEN SATURATION: 98 % | BODY MASS INDEX: 19.94 KG/M2 | WEIGHT: 101.6 LBS | TEMPERATURE: 97.4 F | HEIGHT: 60 IN

## 2023-10-25 DIAGNOSIS — Z00.00 ANNUAL PHYSICAL EXAM: ICD-10-CM

## 2023-10-25 DIAGNOSIS — Z23 ENCOUNTER FOR IMMUNIZATION: ICD-10-CM

## 2023-10-25 DIAGNOSIS — Z23 NEED FOR INFLUENZA VACCINATION: ICD-10-CM

## 2023-10-25 DIAGNOSIS — Z12.11 SCREENING FOR MALIGNANT NEOPLASM OF COLON: Primary | ICD-10-CM

## 2023-10-25 PROCEDURE — 90686 IIV4 VACC NO PRSV 0.5 ML IM: CPT

## 2023-10-25 PROCEDURE — 99396 PREV VISIT EST AGE 40-64: CPT | Performed by: NURSE PRACTITIONER

## 2023-10-25 PROCEDURE — 90471 IMMUNIZATION ADMIN: CPT

## 2023-10-25 NOTE — ASSESSMENT & PLAN NOTE
-due for fasting blood work   -Continue with well-balanced diet, encouraged daily exercise  -She is up-to-date with mammogram, however is due for colonoscopy   -She is up-to-date with vaccinations  -Encourage monthly self breast examination  -Follow-up in 1 year for annual physical or sooner if needed

## 2023-10-25 NOTE — PROGRESS NOTES
[facility]  Well Adult Female Physical Visit  Patient ID: Melida Keller    : 1960  Age/Gender: 66 y.o. female     DATE: 10/25/2023      Assessment/Plan:    Annual physical exam  -due for fasting blood work   -Continue with well-balanced diet, encouraged daily exercise  -She is up-to-date with mammogram, however is due for colonoscopy   -She is up-to-date with vaccinations  -Encourage monthly self breast examination  -Follow-up in 1 year for annual physical or sooner if needed       Diagnoses and all orders for this visit:    Screening for malignant neoplasm of colon  -     Ambulatory Referral to Gastroenterology; Future    Encounter for immunization  -     influenza vaccine, quadrivalent, 0.5 mL, preservative-free, for adult and pediatric patients 6 mos+ (AFLURIA, FLUARIX, FLULAVAL, FLUZONE)    Need for influenza vaccination  -     influenza vaccine, quadrivalent, 0.5 mL, preservative-free, for adult and pediatric patients 6 mos+ (AFLURIA, FLUARIX, FLULAVAL, FLUZONE)    Annual physical exam  -     Comprehensive metabolic panel; Future  -     CBC and differential; Future  -     Lipid panel; Future  -     TSH, 3rd generation with Free T4 reflex; Future  -     CBC and differential  -     Comprehensive metabolic panel;  Future  -     Lipid panel  -     TSH, 3rd generation with Free T4 reflex          Subjective:     Melida Keller is a 66 y.o. female who presents to the office on 10/25/2023 for a health maintenance physical.    HPI  The following portions of the patient's history were reviewed and updated as appropriate: allergies, current medications, past family history, past medical history, past social history, past surgical history, and problem list.    Pt reports overall health:  good  Last Physical: last year  Last GYN Exam:  yearly    Healthy Diet:  well balanced   Routine Exercise:  5 days a week  Weight Concerns:  denies    Problems with vision: trouble with reading  Last Eye Exam:  4 years ago     Problems with Hearing:  denies    Routine Dental Exams:  every 6 months    Smoking History:  denies  ETOH Use:  socially  Illegal Drug Use:  denies  Caffeine Use:  one cup of coffee a day     Reproductive Health:    Last PAP:  s/p hysterectomy   History of abnormal PAP: denies  Vaginal Discharge:  denies    Last Mammo: 2023  History of abnormal Mammo: denies  Self Breast Exams: monthly     Depression Screening:  PHQ-2/9 Depression Screening    Little interest or pleasure in doing things: 0 - not at all  Feeling down, depressed, or hopeless: 0 - not at all  PHQ-2 Score: 0  PHQ-2 Interpretation: Negative depression screen           Last Colonoscopy:  2013  History of abnormal Colonoscopy:  denies  Family History of Colon CA:  denies     Last Dexa: 2023    Last Labs:  2022    Review of Systems   Constitutional:  Negative for activity change, appetite change, chills, diaphoresis and fever. HENT:  Negative for congestion, ear discharge, ear pain, postnasal drip, rhinorrhea, sinus pressure, sinus pain and sore throat. Eyes:  Negative for pain, discharge, itching and visual disturbance. Respiratory:  Negative for cough, chest tightness, shortness of breath and wheezing. Cardiovascular:  Negative for chest pain, palpitations and leg swelling. Gastrointestinal:  Negative for abdominal pain, blood in stool, constipation, diarrhea, nausea and vomiting. Endocrine: Negative for polydipsia, polyphagia and polyuria. Genitourinary:  Negative for difficulty urinating, dysuria, hematuria and urgency. Musculoskeletal:  Negative for arthralgias, back pain and neck pain. Skin:  Negative for rash and wound. Neurological:  Negative for dizziness, weakness, numbness and headaches.          Patient Active Problem List   Diagnosis    Anxiety    Cervical radiculopathy    Cervical spondylosis without myelopathy    Dense breasts    Chronic neck pain Lipids abnormal    Lumbosacral spondylosis without myelopathy    Primary insomnia    Annual physical exam    Dermatochalasis    Bartholin's gland abscess    Atrophic vaginitis    Current use of estrogen therapy    Protein-calorie malnutrition, unspecified severity ([address])    Osteopenia of multiple sites       Past Medical History:   Diagnosis Date    Anxiety     Arthritis     Last Assessed:  4/17/15    Family history of anesthesia complication     3/9/08 Pt did report her mother "having difficulty coming out of anesthesia" - no specific issues mentioned    Herniated disc, cervical     Hot flashes 2017    Lipids abnormal     Last Assessed:  16    Malaise and fatigue 2012    Menopausal symptoms 2016    Last Assessment & Plan:  Endorses hot flashes, night sweats, mood lability, difficulty sleeping, vaginal dryness and low libido. Discussed that these symptoms appear consistent with menopause. We discussed options for management including lifestyle changes, OTC medications, moisturizers and lubricants, such as coconut oil.  We discussed pharmacologic treatments such as SSRI's, vaginal estrogen and     PONV (postoperative nausea and vomiting)     Shoulder joint pain 2018    Spinal stenosis     Strain of supraspinatus muscle or tendon 2018    Vaginal delivery     Pt reports one V back birth after        Past Surgical History:   Procedure Laterality Date    BLEPHAROPLASTY Bilateral     upper bleph bilateral    BLEPHAROPLASTY Bilateral 2022    Procedure: BLEPHAROPLASTY LOWER;  Surgeon: Lacho Dasilva MD;  Location: [address] [place] [address];  Service: Plastics    BROW LIFT      Tacks placed in and dissolve     SECTION  1993    x1    EPIDURAL BLOCK INJECTION      back    [place] Right     Last Assessed:  4/17/15- pt reports right femoral    HYSTERECTOMY      Partial    ROTATOR CUFF REPAIR      Last Assessed:  4/17/15    SEPTOPLASTY      SHOULDER SURGERY Left     Pt reports left rotator cuff surgery          Current Outpatient Medications:     estradiol (ESTRACE) 0.1 mg/g vaginal cream, INSERT 1 GRAM AT BEDTIME TWICE WEEKLY, Disp: 42.5 g, Rfl: 3    fluticasone (FLONASE) 50 mcg/act nasal spray, 2 sprays into each nostril daily (Patient taking differently: into each nostril as needed), Disp: 1 Bottle, Rfl: 11    PreviDent 5000 Booster Plus 1.1 % PSTE, As directed, Disp: , Rfl:     Sulfacetamide Sodium, Acne, 10 % LOTN, APPLY TOPICALLY TO FACE EVERY MORNING, Disp: , Rfl:     tretinoin (REFISSA) 0.05 % cream, APPLY PEA SIZED AMOUNT TO ENTIRE FACE EVERY NIGHT AT BEDTIME 1 HOUR AFTER WASHING FACE, Disp: , Rfl:     Allergies   Allergen Reactions    Epinephrine Other (See Comments)     Hyperventilate- flushed and palpitations    Erythromycin Other (See Comments)     Gave pt cdiff    Levofloxacin Other (See Comments)     Lymphadenopathy - rash and hives    Iv Dye [Iodinated Contrast Media] Other (See Comments)     Hyperventilated - flushed - "had to reverse it" -had palpitations     Procaine Other (See Comments)     Annotation - [id number]: When used with Epi- hyperventilated, flushed and palpitations. Ampicillin Other (See Comments)     RASH    Moxifloxacin Rash     Swelling in lymphnodes    Penicillins Rash       Social History     Socioeconomic History    Marital status: Single     Spouse name: None    Number of children: 2    Years of education: None    Highest education level: None   Occupational History    Occupation: Physical therapist   Tobacco Use    Smoking status: Never    Smokeless tobacco: Never    Tobacco comments:     Denies   Vaping Use    Vaping Use: Never used   Substance and Sexual Activity    Alcohol use:  Yes     Alcohol/week: 1.0 - 2.0 standard drink of alcohol     Types: 1 - 2 Standard drinks or equivalent per week     Comment: Socially- 4x/week    Drug use: No     Comment: Denies     Sexual activity: Yes     Partners: Male     Birth control/protection: Surgical     Comment: Denies any chest pain or shortness of breath with activity    Other Topics Concern    None   Social History Narrative    Caffeine use:  She admits to consuming caffeine via coffee (1 serving per day)    Exercises regularly:  Primary form of exercise is aerobics and weightlifting.   Frequency is 3-5 times per week    Per [employer]:       Social Determinants of Health     Financial Resource Strain: Not on file   Food Insecurity: Not on file   Transportation Needs: Not on file   Physical Activity: Not on file   Stress: Not on file   Social Connections: Not on file   Intimate Partner Violence: Not on file   Housing Stability: Not on file       Family History   Problem Relation Age of Onset    Lupus Mother     Other Mother         Rheumatic disease    Diabetes Mother     Lung cancer Father 77        smoker    Blindness Brother     Heart attack Brother     Stroke Brother         half brother    Prostate cancer Brother     No Known Problems Maternal Grandmother     No Known Problems Maternal Grandfather     No Known Problems Paternal Grandmother     No Known Problems Paternal Grandfather     No Known Problems Son     No Known Problems Son        Immunization History   Administered Date(s) Administered    COVID-19 MODERNA VACC 0.5 ML IM 01/05/2021, 02/05/2021    INFLUENZA 10/01/2007, 10/20/2019, 10/15/2020    Influenza, injectable, quadrivalent, preservative free 0.5 mL 10/25/2023    Influenza, seasonal, injectable 10/09/2014    Influenza, seasonal, injectable, preservative free 10/20/2019    Tdap 08/05/2016    Zoster Vaccine Recombinant 09/02/2020, 11/10/2020    influenza, injectable, quadrivalent 10/15/2020        Health Maintenance   Topic Date Due    COVID-19 Vaccine (3 - Tony Redd series) 04/02/2021    Colorectal Cancer Screening  10/15/2023    Breast Cancer Screening: Mammogram  09/13/2024    Depression Screening  10/25/2024    BMI: Adult  10/25/2024    Annual Physical 10/25/2024    DTaP,Tdap,and Td Vaccines (2 - Td or Tdap) 08/05/2026    HIV Screening  Completed    Hepatitis C Screening  Completed    Influenza Vaccine  Completed    Pneumococcal Vaccine: Pediatrics (0 to 5 Years) and At-Risk Patients (6 to 59 Years)  Aged Out    HIB Vaccine  Aged Out    IPV Vaccine  Aged Out    Hepatitis A Vaccine  Aged Out    Meningococcal ACWY Vaccine  Aged Out    HPV Vaccine  Aged Out         Objective:  Vitals:    10/25/23 0657   BP: 108/68   BP Location: Left arm   Patient Position: Sitting   Cuff Size: Standard   Pulse: 74   Temp: (!) 97.4 °F (36.3 °C)   TempSrc: Temporal   SpO2: 98%   Weight: 46.1 kg (101 lb 9.6 oz)   Height: 5' (1.524 m)     Wt Readings from Last 3 Encounters:   10/25/23 46.1 kg (101 lb 9.6 oz)   03/21/23 45.9 kg (101 lb 3.2 oz)   10/12/22 44.2 kg (97 lb 6.4 oz)     Body mass index is 19.84 kg/m². No results found. Physical Exam  Constitutional:       General: She is not in acute distress. Appearance: She is well-developed. She is not diaphoretic. HENT:      Head: Normocephalic and atraumatic. Right Ear: External ear normal.      Left Ear: External ear normal.      Nose: Nose normal.      Mouth/Throat:      Mouth: Mucous membranes are moist.      Pharynx: No oropharyngeal exudate or posterior oropharyngeal erythema. Eyes:      General:         Right eye: No discharge. Left eye: No discharge. Conjunctiva/sclera: Conjunctivae normal.      Pupils: Pupils are equal, round, and reactive to light. Neck:      Thyroid: No thyromegaly. Cardiovascular:      Rate and Rhythm: Normal rate and regular rhythm. Heart sounds: Normal heart sounds. No murmur heard. No friction rub. No gallop. Pulmonary:      Effort: Pulmonary effort is normal. No respiratory distress. Breath sounds: Normal breath sounds. No stridor. No wheezing or rales. Abdominal:      General: Bowel sounds are normal. There is no distension.       Palpations: Abdomen is soft.      Tenderness: There is no abdominal tenderness. Musculoskeletal:      Cervical back: Normal range of motion and neck supple. Lymphadenopathy:      Cervical: No cervical adenopathy. Skin:     General: Skin is warm and dry. Findings: No erythema or rash. Neurological:      Mental Status: She is alert and oriented to person, place, and time. Psychiatric:         Behavior: Behavior normal.         Thought Content:  Thought content normal.         Judgment: Judgment normal.             Future Appointments   Date Time Provider Department Center   3/27/2024 11:40 AM Arin Li MD [address] [place] [address]    Patient Care Team:  Kev casper PCP - General (Family Medicine)  Janette Martínez MD (Pain Medicine) 164

## 2023-11-03 ENCOUNTER — APPOINTMENT (OUTPATIENT)
Dept: LAB | Age: 63
End: 2023-11-03
Payer: COMMERCIAL

## 2023-11-03 ENCOUNTER — APPOINTMENT (OUTPATIENT)
Dept: LAB | Age: 63
End: 2023-11-03

## 2023-11-03 DIAGNOSIS — Z00.00 ANNUAL PHYSICAL EXAM: ICD-10-CM

## 2023-11-03 LAB
ALBUMIN SERPL BCP-MCNC: 4.6 G/DL (ref 3.5–5)
ALP SERPL-CCNC: 66 U/L (ref 34–104)
ALT SERPL W P-5'-P-CCNC: 21 U/L (ref 7–52)
ANION GAP SERPL CALCULATED.3IONS-SCNC: 8 MMOL/L
AST SERPL W P-5'-P-CCNC: 26 U/L (ref 13–39)
BASOPHILS # BLD AUTO: 0.02 THOUSANDS/ÂΜL (ref 0–0.1)
BASOPHILS NFR BLD AUTO: 0 % (ref 0–1)
BILIRUB SERPL-MCNC: 0.7 MG/DL (ref 0.2–1)
BUN SERPL-MCNC: 11 MG/DL (ref 5–25)
CALCIUM SERPL-MCNC: 9.9 MG/DL (ref 8.4–10.2)
CHLORIDE SERPL-SCNC: 103 MMOL/L (ref 96–108)
CHOLEST SERPL-MCNC: 206 MG/DL
CO2 SERPL-SCNC: 29 MMOL/L (ref 21–32)
CREAT SERPL-MCNC: 0.68 MG/DL (ref 0.6–1.3)
EOSINOPHIL # BLD AUTO: 0.06 THOUSAND/ÂΜL (ref 0–0.61)
EOSINOPHIL NFR BLD AUTO: 1 % (ref 0–6)
ERYTHROCYTE [DISTWIDTH] IN BLOOD BY AUTOMATED COUNT: 11.8 % (ref 11.6–15.1)
GFR SERPL CREATININE-BSD FRML MDRD: 94 ML/MIN/1.73SQ M
GLUCOSE P FAST SERPL-MCNC: 91 MG/DL (ref 65–99)
HCT VFR BLD AUTO: 40.5 % (ref 34.8–46.1)
HDLC SERPL-MCNC: 103 MG/DL
HGB BLD-MCNC: 13.5 G/DL (ref 11.5–15.4)
IMM GRANULOCYTES # BLD AUTO: 0.02 THOUSAND/UL (ref 0–0.2)
IMM GRANULOCYTES NFR BLD AUTO: 0 % (ref 0–2)
LDLC SERPL CALC-MCNC: 94 MG/DL (ref 0–100)
LYMPHOCYTES # BLD AUTO: 1.78 THOUSANDS/ÂΜL (ref 0.6–4.47)
LYMPHOCYTES NFR BLD AUTO: 29 % (ref 14–44)
MCH RBC QN AUTO: 32 PG (ref 26.8–34.3)
MCHC RBC AUTO-ENTMCNC: 33.3 G/DL (ref 31.4–37.4)
MCV RBC AUTO: 96 FL (ref 82–98)
MONOCYTES # BLD AUTO: 0.52 THOUSAND/ÂΜL (ref 0.17–1.22)
MONOCYTES NFR BLD AUTO: 8 % (ref 4–12)
NEUTROPHILS # BLD AUTO: 3.8 THOUSANDS/ÂΜL (ref 1.85–7.62)
NEUTS SEG NFR BLD AUTO: 62 % (ref 43–75)
NONHDLC SERPL-MCNC: 103 MG/DL
NRBC BLD AUTO-RTO: 0 /100 WBCS
PLATELET # BLD AUTO: 256 THOUSANDS/UL (ref 149–390)
PMV BLD AUTO: 11.7 FL (ref 8.9–12.7)
POTASSIUM SERPL-SCNC: 4.5 MMOL/L (ref 3.5–5.3)
PROT SERPL-MCNC: 7.5 G/DL (ref 6.4–8.4)
RBC # BLD AUTO: 4.22 MILLION/UL (ref 3.81–5.12)
SODIUM SERPL-SCNC: 140 MMOL/L (ref 135–147)
TRIGL SERPL-MCNC: 43 MG/DL
TSH SERPL DL<=0.05 MIU/L-ACNC: 1.41 UIU/ML (ref 0.45–4.5)
WBC # BLD AUTO: 6.2 THOUSAND/UL (ref 4.31–10.16)

## 2023-11-03 PROCEDURE — 80053 COMPREHEN METABOLIC PANEL: CPT

## 2023-11-03 PROCEDURE — 36415 COLL VENOUS BLD VENIPUNCTURE: CPT | Performed by: NURSE PRACTITIONER

## 2023-11-03 PROCEDURE — 84443 ASSAY THYROID STIM HORMONE: CPT | Performed by: NURSE PRACTITIONER

## 2023-11-17 ENCOUNTER — PREP FOR PROCEDURE (OUTPATIENT)
Age: 63
End: 2023-11-17

## 2023-11-17 ENCOUNTER — TELEPHONE (OUTPATIENT)
Age: 63
End: 2023-11-17

## 2023-11-17 DIAGNOSIS — Z12.11 SCREENING FOR COLON CANCER: Primary | ICD-10-CM

## 2023-11-17 NOTE — TELEPHONE ENCOUNTER
Scheduled date of colonoscopy (as of today):  Physician performing colonoscopy: MOHAN   Location of colonoscopy:    Bowel prep reviewed with patient:OSBALDO/DEBORA  Instructions reviewed with patient by:   Clearances: NONE    Pt will call back needs to check work sourav

## 2023-11-17 NOTE — TELEPHONE ENCOUNTER
11/17/23  Screened by: Latrice Aguilera    Referring Provider UMBERTO    Pre- Screening: There is no height or weight on file to calculate BMI. 18.9  Has patient been referred for a routine screening Colonoscopy? yes  Is the patient between 43-73 years old? yes      Previous Colonoscopy yes   If yes:    Date: [address] [facility]:     Reason:       SCHEDULING STAFF: If the patient is between 45yrs-49yrs, please advise patient to confirm benefits/coverage with their insurance company for a routine screening colonoscopy, some insurance carriers will only cover at [address] [place] [address] or older. If the patient is over 66years old, please schedule an office visit. Does the patient want to see a Gastroenterologist prior to their procedure OR are they having any GI symptoms? no    Has the patient been hospitalized or had abdominal surgery in the past 6 months? no    Does the patient use supplemental oxygen? no    Does the patient take Coumadin, Lovenox, Plavix, Elliquis, Xarelto, or other blood thinning medication? no    Has the patient had a stroke, cardiac event, or stent placed in the past year? no    PT PASS OA    SCHEDULING STAFF: If patient answers NO to above questions, then schedule procedure. If patient answers YES to above questions, then schedule office appointment. If patient is between 45yrs - 49yrs, please advise patient that we will have to confirm benefits & coverage with their insurance company for a routine screening colonoscopy.

## 2023-11-21 NOTE — TELEPHONE ENCOUNTER
Scheduled date of colonoscopy (as of today): 3/15/2024    Physician performing colonoscopy: Martina     [facility]    Bowel prep reviewed with patient: Miralax    Instructions reviewed with patient by: AL  Email    Clearances: None

## 2023-11-21 NOTE — TELEPHONE ENCOUNTER
Pt stated after her last colonoscopy she had a difficult time recovering from the prep and needed antispasmodics, pt is requesting this ahead of time. Preferred pharmacy is [facility] in [place].

## 2023-11-27 DIAGNOSIS — Z12.11 COLON CANCER SCREENING: Primary | ICD-10-CM

## 2023-11-27 RX ORDER — DICYCLOMINE HCL 20 MG
20 TABLET ORAL EVERY 6 HOURS PRN
Qty: 120 TABLET | Refills: 0 | Status: SHIPPED | OUTPATIENT
Start: 2023-11-27

## 2023-12-22 DIAGNOSIS — Z12.11 COLON CANCER SCREENING: ICD-10-CM

## 2023-12-22 RX ORDER — DICYCLOMINE HCL 20 MG
TABLET ORAL
Qty: 360 TABLET | Refills: 1 | Status: SHIPPED | OUTPATIENT
Start: 2023-12-22

## 2024-02-28 ENCOUNTER — TELEPHONE (OUTPATIENT)
Dept: GASTROENTEROLOGY | Facility: CLINIC | Age: 64
End: 2024-02-28

## 2024-03-06 ENCOUNTER — TELEPHONE (OUTPATIENT)
Dept: GASTROENTEROLOGY | Facility: MEDICAL CENTER | Age: 64
End: 2024-03-06

## 2024-03-07 ENCOUNTER — TELEPHONE (OUTPATIENT)
Dept: GASTROENTEROLOGY | Facility: CLINIC | Age: 64
End: 2024-03-07

## 2024-03-07 NOTE — TELEPHONE ENCOUNTER
Pt expressed some frustration regarding her colonoscopy with Dr. Mack being canceled 1.5 weeks prior to it taking place. Pt had requested off from work and found a  and now had to r/s to 5/24 as she can only do Fridays and this is the day that worked for her. I advised I would pass her concerns along.

## 2024-04-18 NOTE — PROGRESS NOTES
Assessment/Plan:  NGE                          Menopausal symptoms of warmth, sleep disturbance, vaginal dryness and low libido-referred to Dr. Jessica Mcintosh for HRT discussion.                                                                                       Osteopenia - all 3 sites, LFN -1.9 [FRAX 0.9/7.5] 6/23  Co- Testing q 5 yrs.                                                                                 RTO 1 yr.                                                                                    SBA monthly  3 D Mammography   Colonoscopy  47  Exercise 3/wk  -enough                Calcium 1,200 mg/d with Vit D -was taking 1200 mg all at once, explained    Depression Screen: Neg        Diagnoses and all orders for this visit:    Encounter for annual routine gynecological examination    Encounter for screening mammogram for malignant neoplasm of breast  -     Mammo screening bilateral w 3d & cad; Future    Atrophic vaginitis    Current use of estrogen therapy    Other orders  -     Na Sulfate-K Sulfate-Mg Sulf 17.5-3.13-1.6 GM/177ML SOLN; Take as directed              Subjective:        Patient ID: Tiffany Hays is a 63 y.o. female.    Tiffany presents today for an annual visit.  Overall she is doing well but complaining of decreased libido, vaginal dryness, and sleep disturbance.  She like to talk about hormone replacement therapy.  She has had a hysterectomy so determining menopause is problematic.  She had experienced night sweats and hot flashes in the past which seem to be diminishing but are still present.  Her brother works for a pharmaceutical company making HRT and she has had hormone levels tested.    The Bartholin gland cyst has never recurred since the Word catheter placed 2 years ago.  She has been using Estrace but has only been using it maybe once a week.  She does not need a refill.        The following portions of the patient's history were reviewed and updated as appropriate: She  has  a past medical history of Anxiety, Arthritis, Family history of anesthesia complication, Herniated disc, cervical, Hot flashes (2017), Lipids abnormal, Malaise and fatigue (2012), Menopausal symptoms (2016), PONV (postoperative nausea and vomiting), Shoulder joint pain (2018), Spinal stenosis, Strain of supraspinatus muscle or tendon (2018), and Vaginal delivery.  Patient Active Problem List    Diagnosis Date Noted    Osteopenia of multiple sites 2023    Protein-calorie malnutrition, unspecified severity (HCC) 10/12/2022    Atrophic vaginitis 2022    Current use of estrogen therapy 2022    Bartholin's gland abscess 2022    Dermatochalasis 2022    Annual physical exam 2018    Primary insomnia 2018    Lumbosacral spondylosis without myelopathy 2018    Anxiety 2018    Lipids abnormal 2018    Cervical radiculopathy 2017    Cervical spondylosis without myelopathy 2017    Chronic neck pain 2017    Dense breasts 2017   PMH:  G4, P2 - C/S , , 2 Spont Ab  AV Vag Estrogen  R Bartholin Gland Abscess -  I&D.Re-accumulation of Bartholin abscess due to loculations. Repeat I&D, loculations broken up.Word catheter placed  She  has a past surgical history that includes Hysterectomy (); Rotator cuff repair; Epidural block injection; Septoplasty; Exploratory laparotomy (); Hernia repair (Right);  section (); Shoulder surgery (Left); Brow lift; Blepharoplasty (Bilateral); and BLEPHAROPLASTY (Bilateral, 2022).  Her family history includes Blindness in her brother; Diabetes in her mother; Heart attack in her brother; Lung cancer (age of onset: 66) in her father; Lupus in her mother; No Known Problems in her maternal grandfather, maternal grandmother, paternal grandfather, paternal grandmother, son, and son; Other in her mother; Prostate cancer in her brother; Stroke in her brother.  She  reports that  she has never smoked. She has never used smokeless tobacco. She reports current alcohol use of about 1.0 - 2.0 standard drink of alcohol per week. She reports that she does not use drugs.  SH:  [facility] for 33 years and recently now  for [employer]ISE Srinivasan.  to [place].  Current Outpatient Medications   Medication Sig Dispense Refill    estradiol (ESTRACE) 0.1 mg/g vaginal cream INSERT 1 GRAM AT BEDTIME TWICE WEEKLY 42.5 g 3    fluticasone (FLONASE) 50 mcg/act nasal spray 2 sprays into each nostril daily (Patient taking differently: into each nostril as needed) 1 Bottle 11    PreviDent 5000 Booster Plus 1.1 % PSTE As directed      Sulfacetamide Sodium, Acne, 10 % LOTN APPLY TOPICALLY TO FACE EVERY MORNING      tretinoin (REFISSA) 0.05 % cream APPLY PEA SIZED AMOUNT TO ENTIRE FACE EVERY NIGHT AT BEDTIME 1 HOUR AFTER WASHING FACE      dicyclomine (BENTYL) 20 mg tablet TAKE 1 TABLET (20 MG TOTAL) BY MOUTH EVERY 6 HOURS AS NEEDED FOR ABDOMINAL PAIN 360 tablet 1    Na Sulfate-K Sulfate-Mg Sulf 17.5-3.13-1.6 GM/177ML SOLN Take as directed       No current facility-administered medications for this visit.     Current Outpatient Medications on File Prior to Visit   Medication Sig    estradiol (ESTRACE) 0.1 mg/g vaginal cream INSERT 1 GRAM AT BEDTIME TWICE WEEKLY    fluticasone (FLONASE) 50 mcg/act nasal spray 2 sprays into each nostril daily (Patient taking differently: into each nostril as needed)    PreviDent 5000 Booster Plus 1.1 % PSTE As directed    Sulfacetamide Sodium, Acne, 10 % LOTN APPLY TOPICALLY TO FACE EVERY MORNING    tretinoin (REFISSA) 0.05 % cream APPLY PEA SIZED AMOUNT TO ENTIRE FACE EVERY NIGHT AT BEDTIME 1 HOUR AFTER WASHING FACE    dicyclomine (BENTYL) 20 mg tablet TAKE 1 TABLET (20 MG TOTAL) BY MOUTH EVERY 6 HOURS AS NEEDED FOR ABDOMINAL PAIN    Na Sulfate-K Sulfate-Mg Sulf 17.5-3.13-1.6 GM/177ML SOLN Take as directed     No current facility-administered medications on file  "prior to visit.     She is allergic to epinephrine, erythromycin, levofloxacin, iv dye [iodinated contrast media], procaine, ampicillin, moxifloxacin, and penicillins..    Review of Systems   Constitutional:  Negative for activity change, appetite change, fatigue and unexpected weight change.   Eyes:  Negative for visual disturbance.   Respiratory:  Negative for cough, chest tightness, shortness of breath and wheezing.    Cardiovascular:  Negative for chest pain, palpitations and leg swelling.        Breast: Patient denies tenderness, nipple discharge, masses, or erythema.   Gastrointestinal:  Negative for abdominal distention, abdominal pain, blood in stool, constipation, diarrhea, nausea and vomiting.   Endocrine: Negative for cold intolerance and heat intolerance.   Genitourinary:  Negative for decreased urine volume, difficulty urinating, dysuria, frequency, hematuria, menstrual problem, pelvic pain, urgency, vaginal bleeding, vaginal discharge and vaginal pain.        Stable stress incontinence.  [place] once a week which she really is not too interested in.  She adores her .   Musculoskeletal:  Negative for arthralgias.   Skin:  Negative for rash.   Neurological:  Negative for weakness, light-headedness, numbness and headaches.   Hematological:  Does not bruise/bleed easily.   Psychiatric/Behavioral:  Negative for agitation, behavioral problems and sleep disturbance. The patient is not nervous/anxious.          Objective:    Vitals:    04/19/24 0850   BP: 110/68   BP Location: Left arm   Patient Position: Sitting   Cuff Size: Standard   Weight: 47.6 kg (105 lb)   Height: 5' 0.5\" (1.537 m)            Physical Exam  Vitals and nursing note reviewed. Exam conducted with a chaperone present.   Constitutional:       Appearance: She is well-developed.   HENT:      Head: Normocephalic and atraumatic.   Eyes:      General: No scleral icterus.        Right eye: No discharge.         Left eye: No discharge. "      Extraocular Movements: Extraocular movements intact.      Conjunctiva/sclera: Conjunctivae normal.   Neck:      Thyroid: No thyromegaly.      Trachea: No tracheal deviation.   Cardiovascular:      Rate and Rhythm: Normal rate and regular rhythm.      Heart sounds: Normal heart sounds. No murmur heard.  Pulmonary:      Effort: Pulmonary effort is normal. No respiratory distress.      Breath sounds: Normal breath sounds. No wheezing.   Chest:   Breasts:     Breasts are symmetrical.      Right: No inverted nipple, mass, nipple discharge, skin change or tenderness.      Left: No inverted nipple, mass, nipple discharge, skin change or tenderness.   Abdominal:      General: Bowel sounds are normal. There is no distension.      Palpations: Abdomen is soft. There is no mass.      Tenderness: There is no abdominal tenderness. There is no guarding or rebound.   Genitourinary:     General: Normal vulva.      Labia:         Right: No rash, tenderness or lesion.         Left: No rash, tenderness or lesion.       Vagina: Normal.      Adnexa:         Right: No mass, tenderness or fullness.          Left: No mass, tenderness or fullness.        Rectum: No external hemorrhoid.      Comments: Urethral meatus within normal limits.  Perineum within normal limits.  Bladder well supported.  Uterus and cervix surgically removed.  Physiologic vaginal atrophy.  Musculoskeletal:         General: No swelling. Normal range of motion.      Cervical back: Normal range of motion and neck supple.      Right lower leg: No edema.      Left lower leg: No edema.   Skin:     General: Skin is warm and dry.   Neurological:      Mental Status: She is alert and oriented to person, place, and time.   Psychiatric:         Mood and Affect: Mood normal.         Behavior: Behavior normal.         Thought Content: Thought content normal.         Judgment: Judgment normal.

## 2024-04-19 ENCOUNTER — ANNUAL EXAM (OUTPATIENT)
Dept: OBGYN CLINIC | Facility: CLINIC | Age: 64
End: 2024-04-19
Payer: COMMERCIAL

## 2024-04-19 VITALS
SYSTOLIC BLOOD PRESSURE: 110 MMHG | DIASTOLIC BLOOD PRESSURE: 68 MMHG | HEIGHT: 61 IN | BODY MASS INDEX: 19.83 KG/M2 | WEIGHT: 105 LBS

## 2024-04-19 DIAGNOSIS — Z12.31 ENCOUNTER FOR SCREENING MAMMOGRAM FOR MALIGNANT NEOPLASM OF BREAST: ICD-10-CM

## 2024-04-19 DIAGNOSIS — Z01.419 ENCOUNTER FOR ANNUAL ROUTINE GYNECOLOGICAL EXAMINATION: Primary | ICD-10-CM

## 2024-04-19 DIAGNOSIS — N95.2 ATROPHIC VAGINITIS: ICD-10-CM

## 2024-04-19 DIAGNOSIS — Z79.899 CURRENT USE OF ESTROGEN THERAPY: ICD-10-CM

## 2024-04-19 PROCEDURE — 99396 PREV VISIT EST AGE 40-64: CPT | Performed by: OBSTETRICS & GYNECOLOGY

## 2024-04-19 RX ORDER — SODIUM, POTASSIUM,MAG SULFATES 17.5-3.13G
SOLUTION, RECONSTITUTED, ORAL ORAL
COMMUNITY

## 2024-05-10 ENCOUNTER — ANESTHESIA EVENT (OUTPATIENT)
Dept: ANESTHESIOLOGY | Facility: HOSPITAL | Age: 64
End: 2024-05-10

## 2024-05-10 ENCOUNTER — ANESTHESIA (OUTPATIENT)
Dept: ANESTHESIOLOGY | Facility: HOSPITAL | Age: 64
End: 2024-05-10

## 2024-05-11 DIAGNOSIS — N95.2 ATROPHIC VAGINITIS: ICD-10-CM

## 2024-05-13 RX ORDER — ESTRADIOL 0.1 MG/G
CREAM VAGINAL
Qty: 42.5 G | Refills: 1 | Status: SHIPPED | OUTPATIENT
Start: 2024-05-13

## 2024-05-23 RX ORDER — SODIUM CHLORIDE, SODIUM LACTATE, POTASSIUM CHLORIDE, CALCIUM CHLORIDE 600; 310; 30; 20 MG/100ML; MG/100ML; MG/100ML; MG/100ML
75 INJECTION, SOLUTION INTRAVENOUS CONTINUOUS
Status: CANCELLED | OUTPATIENT
Start: 2024-05-23

## 2024-05-24 ENCOUNTER — HOSPITAL ENCOUNTER (OUTPATIENT)
Dept: GASTROENTEROLOGY | Facility: AMBULARY SURGERY CENTER | Age: 64
Setting detail: OUTPATIENT SURGERY
End: 2024-05-24
Attending: INTERNAL MEDICINE
Payer: COMMERCIAL

## 2024-05-24 ENCOUNTER — ANESTHESIA (OUTPATIENT)
Dept: GASTROENTEROLOGY | Facility: AMBULARY SURGERY CENTER | Age: 64
End: 2024-05-24

## 2024-05-24 ENCOUNTER — ANESTHESIA EVENT (OUTPATIENT)
Dept: GASTROENTEROLOGY | Facility: AMBULARY SURGERY CENTER | Age: 64
End: 2024-05-24

## 2024-05-24 VITALS
SYSTOLIC BLOOD PRESSURE: 126 MMHG | TEMPERATURE: 96.4 F | WEIGHT: 100 LBS | BODY MASS INDEX: 19.63 KG/M2 | OXYGEN SATURATION: 100 % | DIASTOLIC BLOOD PRESSURE: 61 MMHG | HEART RATE: 59 BPM | HEIGHT: 60 IN | RESPIRATION RATE: 17 BRPM

## 2024-05-24 DIAGNOSIS — Z12.11 SCREENING FOR COLON CANCER: ICD-10-CM

## 2024-05-24 PROBLEM — T88.59XA DELAYED EMERGENCE FROM ANESTHESIA: Status: ACTIVE | Noted: 2024-05-24

## 2024-05-24 PROBLEM — Z98.890 PONV (POSTOPERATIVE NAUSEA AND VOMITING): Status: ACTIVE | Noted: 2024-05-24

## 2024-05-24 PROBLEM — R11.2 PONV (POSTOPERATIVE NAUSEA AND VOMITING): Status: ACTIVE | Noted: 2024-05-24

## 2024-05-24 PROBLEM — Z90.710 HISTORY OF HYSTERECTOMY: Status: ACTIVE | Noted: 2024-05-24

## 2024-05-24 PROCEDURE — G0121 COLON CA SCRN NOT HI RSK IND: HCPCS | Performed by: INTERNAL MEDICINE

## 2024-05-24 RX ORDER — PROPOFOL 10 MG/ML
INJECTION, EMULSION INTRAVENOUS AS NEEDED
Status: DISCONTINUED | OUTPATIENT
Start: 2024-05-24 | End: 2024-05-24

## 2024-05-24 RX ORDER — PROPOFOL 10 MG/ML
INJECTION, EMULSION INTRAVENOUS CONTINUOUS PRN
Status: DISCONTINUED | OUTPATIENT
Start: 2024-05-24 | End: 2024-05-24

## 2024-05-24 RX ORDER — LIDOCAINE HYDROCHLORIDE 10 MG/ML
INJECTION, SOLUTION EPIDURAL; INFILTRATION; INTRACAUDAL; PERINEURAL AS NEEDED
Status: DISCONTINUED | OUTPATIENT
Start: 2024-05-24 | End: 2024-05-24

## 2024-05-24 RX ORDER — SODIUM CHLORIDE, SODIUM LACTATE, POTASSIUM CHLORIDE, CALCIUM CHLORIDE 600; 310; 30; 20 MG/100ML; MG/100ML; MG/100ML; MG/100ML
75 INJECTION, SOLUTION INTRAVENOUS CONTINUOUS
Status: DISCONTINUED | OUTPATIENT
Start: 2024-05-24 | End: 2024-05-28 | Stop reason: HOSPADM

## 2024-05-24 RX ADMIN — LIDOCAINE HYDROCHLORIDE 50 MG: 10 INJECTION, SOLUTION EPIDURAL; INFILTRATION; INTRACAUDAL; PERINEURAL at 10:40

## 2024-05-24 RX ADMIN — PROPOFOL 50 MG: 10 INJECTION, EMULSION INTRAVENOUS at 10:51

## 2024-05-24 RX ADMIN — PROPOFOL 100 MCG/KG/MIN: 10 INJECTION, EMULSION INTRAVENOUS at 10:40

## 2024-05-24 RX ADMIN — PROPOFOL 100 MG: 10 INJECTION, EMULSION INTRAVENOUS at 10:39

## 2024-05-24 RX ADMIN — PROPOFOL 50 MG: 10 INJECTION, EMULSION INTRAVENOUS at 10:48

## 2024-05-24 NOTE — H&P
"History and Physical -  [facility]  Tiffany Hays 63 y.o. female MRN: [id number]        HPI: 63-year-old female was referred for screening colonoscopy.  Regular bowel movements.    Historical Information   Past Medical History:   Diagnosis Date    Anxiety     Arthritis     Last Assessed:  4/17/15    Family history of anesthesia complication     2/3/22 Pt did report her mother \"having difficulty coming out of anesthesia\" - no specific issues mentioned    Herniated disc, cervical     Hot flashes 2017    Lipids abnormal     Last Assessed:  16    Malaise and fatigue 2012    Menopausal symptoms 2016    Last Assessment & Plan:  Endorses hot flashes, night sweats, mood lability, difficulty sleeping, vaginal dryness and low libido. Discussed that these symptoms appear consistent with menopause. We discussed options for management including lifestyle changes, OTC medications, moisturizers and lubricants, such as coconut oil. We discussed pharmacologic treatments such as SSRI's, vaginal estrogen and     PONV (postoperative nausea and vomiting)     Shoulder joint pain 2018    Spinal stenosis     Strain of supraspinatus muscle or tendon 2018    Vaginal delivery     Pt reports one V back birth after      Past Surgical History:   Procedure Laterality Date    BLEPHAROPLASTY Bilateral     upper bleph bilateral    BLEPHAROPLASTY Bilateral 2022    Procedure: BLEPHAROPLASTY LOWER;  Surgeon: Antolin Woo MD;  Location:  MAIN OR;  Service: Plastics    BROW LIFT      Tacks placed in and dissolve     SECTION  1993    x1    EPIDURAL BLOCK INJECTION      back    EXPLORATORY LAPAROTOMY      HERNIA REPAIR Right     Last Assessed:  4/17/15- pt reports right femoral    HYSTERECTOMY      Partial    ROTATOR CUFF REPAIR      Last Assessed:  4/17/15    SEPTOPLASTY      SHOULDER SURGERY Left     Pt reports left rotator cuff surgery      Social History   Social History " "    Substance and Sexual Activity   Alcohol Use Yes    Alcohol/week: 1.0 - 2.0 standard drink of alcohol    Types: 1 - 2 Standard drinks or equivalent per week    Comment: Socially- 4x/week     Social History     Substance and Sexual Activity   Drug Use No    Comment: Denies      Social History     Tobacco Use   Smoking Status Never   Smokeless Tobacco Never   Tobacco Comments    Denies     Family History   Problem Relation Age of Onset    Lupus Mother     Other Mother         Rheumatic disease    Diabetes Mother     Lung cancer Father 66        smoker    Blindness Brother     Heart attack Brother     Stroke Brother         half brother    Prostate cancer Brother     No Known Problems Maternal Grandmother     No Known Problems Maternal Grandfather     No Known Problems Paternal Grandmother     No Known Problems Paternal Grandfather     No Known Problems Son     No Known Problems Son        Meds/Allergies     Not in a hospital admission.    Allergies   Allergen Reactions    Epinephrine Other (See Comments)     Hyperventilate- flushed and palpitations    Erythromycin Other (See Comments)     Gave pt cdiff    Levofloxacin Other (See Comments)     Lymphadenopathy - rash and hives    Iv Dye [Iodinated Contrast Media] Other (See Comments)     Hyperventilated - flushed - \"had to reverse it\" -had palpitations     Procaine Other (See Comments)     Annotation - [id number]: When used with Epi- hyperventilated, flushed and palpitations.     Ampicillin Other (See Comments)     RASH    Moxifloxacin Rash     Swelling in lymphnodes    Penicillins Rash       Objective     Blood pressure 139/78, pulse 70, temperature (!) 97 °F (36.1 °C), temperature source Temporal, resp. rate 20, height 5' (1.524 m), weight 45.4 kg (100 lb), SpO2 99%.    Physical Exam:    Chest- CTA  Heart- RRR  Abdomen- NT/ND  Extremities- No edema    ASSESSMENT:     Screening for colon cancer    PLAN:    Colonoscopy              "

## 2024-05-24 NOTE — ANESTHESIA PREPROCEDURE EVALUATION
Procedure:  COLONOSCOPY    Relevant Problems   ANESTHESIA   (+) Delayed emergence from anesthesia   (+) PONV (postoperative nausea and vomiting)      GYN   (+) History of hysterectomy      MUSCULOSKELETAL   (+) Cervical spondylosis without myelopathy   (+) Lumbosacral spondylosis without myelopathy      NEURO/PSYCH   (+) Anxiety   (+) Chronic neck pain        Physical Exam    Airway    Mallampati score: II  TM Distance: >3 FB  Neck ROM: full     Dental       Cardiovascular  Rhythm: regular, Rate: normal    Pulmonary   Breath sounds clear to auscultation    Other Findings  post-pubertal.      Anesthesia Plan  ASA Score- 2     Anesthesia Type- IV sedation with anesthesia with ASA Monitors.         Additional Monitors:     Airway Plan:            Plan Factors-    Chart reviewed.        Patient is not a current smoker.              Induction- intravenous.    Postoperative Plan-     Perioperative Resuscitation Plan - Level 1 - Full Code.       Informed Consent- Anesthetic plan and risks discussed with patient.  I personally reviewed this patient with the CRNA. Discussed and agreed on the Anesthesia Plan with the CRNA..

## 2024-06-17 DIAGNOSIS — Z00.6 ENCOUNTER FOR EXAMINATION FOR NORMAL COMPARISON OR CONTROL IN CLINICAL RESEARCH PROGRAM: ICD-10-CM

## 2024-07-19 ENCOUNTER — TELEPHONE (OUTPATIENT)
Age: 64
End: 2024-07-19

## 2024-07-19 ENCOUNTER — APPOINTMENT (OUTPATIENT)
Dept: LAB | Facility: IMAGING CENTER | Age: 64
End: 2024-07-19

## 2024-07-19 DIAGNOSIS — Z00.6 ENCOUNTER FOR EXAMINATION FOR NORMAL COMPARISON OR CONTROL IN CLINICAL RESEARCH PROGRAM: ICD-10-CM

## 2024-07-19 PROCEDURE — 36415 COLL VENOUS BLD VENIPUNCTURE: CPT

## 2024-07-19 NOTE — TELEPHONE ENCOUNTER
Patient contacted by her insurance recommending a Hepatitis C Screening. Please advise and if so where would she get one?

## 2024-07-19 NOTE — TELEPHONE ENCOUNTER
Patient was only asking because her insurance asked. Answer to all Dr. Woods questions are No.    NFA

## 2024-07-30 LAB
APOB+LDLR+PCSK9 GENE MUT ANL BLD/T: NOT DETECTED
BRCA1+BRCA2 DEL+DUP + FULL MUT ANL BLD/T: NOT DETECTED
MLH1+MSH2+MSH6+PMS2 GN DEL+DUP+FUL M: NOT DETECTED

## 2024-08-13 ENCOUNTER — OFFICE VISIT (OUTPATIENT)
Age: 64
End: 2024-08-13
Payer: COMMERCIAL

## 2024-08-13 VITALS
SYSTOLIC BLOOD PRESSURE: 104 MMHG | TEMPERATURE: 97.3 F | BODY MASS INDEX: 19.94 KG/M2 | DIASTOLIC BLOOD PRESSURE: 66 MMHG | HEIGHT: 61 IN | OXYGEN SATURATION: 94 % | WEIGHT: 105.6 LBS | HEART RATE: 78 BPM

## 2024-08-13 DIAGNOSIS — F41.9 ANXIETY: Primary | ICD-10-CM

## 2024-08-13 PROCEDURE — 99213 OFFICE O/P EST LOW 20 MIN: CPT | Performed by: NURSE PRACTITIONER

## 2024-08-13 RX ORDER — ALPRAZOLAM 0.25 MG
0.25 TABLET ORAL 2 TIMES DAILY PRN
Qty: 30 TABLET | Refills: 0 | Status: SHIPPED | OUTPATIENT
Start: 2024-08-13

## 2024-08-13 NOTE — PROGRESS NOTES
Assessment/Plan:    Anxiety  -Restart Xanax as needed  -Controlled substance agreement signed while in office              Diagnoses and all orders for this visit:    Anxiety  -     ALPRAZolam (XANAX) 0.25 mg tablet; Take 1 tablet (0.25 mg total) by mouth 2 (two) times a day as needed for anxiety          Subjective:      Patient ID: Tiffany Hays is a 64 y.o. female.    Patient presents today to follow-up on anxiety, and insomnia.  She reports no new concerns.     Anxiety- was on Celexa and buspar, she weaned off, felt good, anxiety got worse a little over a month ago and she has been using Xanax 3 times a week which has been maintaining her, denies panic attacks     Insomnia-previously taking Ambien however has not needed this medication    Patient reports that she Injured neck C7 3mo ago - has been seeing [facility] for pain management. Has not resolved completely even after epidural.    Started hormonal replacement therapy, started in November 2023        Anxiety  Presents for follow-up visit. Symptoms include excessive worry, insomnia and nervous/anxious behavior. Patient reports no chest pain, dizziness, nausea, palpitations, shortness of breath or suicidal ideas. Symptoms occur rarely. The severity of symptoms is mild. The patient sleeps 4 hours per night. The quality of sleep is non-restorative.     Compliance with medications is %.       The following portions of the patient's history were reviewed and updated as appropriate: allergies, current medications, past family history, past medical history, past social history, past surgical history, and problem list.    Review of Systems   Constitutional:  Negative for activity change, appetite change, chills, diaphoresis and fever.   HENT:  Negative for congestion, ear discharge, ear pain, postnasal drip, rhinorrhea, sinus pressure, sinus pain and sore throat.    Eyes:  Negative for pain, discharge, itching and visual disturbance.   Respiratory:  Negative for  "cough, chest tightness, shortness of breath and wheezing.    Cardiovascular:  Negative for chest pain, palpitations and leg swelling.   Gastrointestinal:  Negative for abdominal pain, constipation, diarrhea, nausea and vomiting.   Endocrine: Negative for polydipsia, polyphagia and polyuria.   Genitourinary:  Negative for difficulty urinating, dysuria and urgency.   Musculoskeletal:  Negative for arthralgias, back pain and neck pain.   Skin:  Negative for rash and wound.   Neurological:  Negative for dizziness, weakness, numbness and headaches.   Psychiatric/Behavioral:  Negative for dysphoric mood and suicidal ideas. The patient is nervous/anxious and has insomnia.          Past Medical History:   Diagnosis Date    Anxiety     Arthritis     Last Assessed:  4/17/15    Family history of anesthesia complication     2/3/22 Pt did report her mother \"having difficulty coming out of anesthesia\" - no specific issues mentioned    Herniated disc, cervical     Hot flashes 2017    Lipids abnormal     Last Assessed:  16    Malaise and fatigue 2012    Menopausal symptoms 2016    Last Assessment & Plan:  Endorses hot flashes, night sweats, mood lability, difficulty sleeping, vaginal dryness and low libido. Discussed that these symptoms appear consistent with menopause. We discussed options for management including lifestyle changes, OTC medications, moisturizers and lubricants, such as coconut oil. We discussed pharmacologic treatments such as SSRI's, vaginal estrogen and     PONV (postoperative nausea and vomiting)     Shoulder joint pain 2018    Spinal stenosis     Strain of supraspinatus muscle or tendon 2018    Vaginal delivery     Pt reports one V back birth after          Current Outpatient Medications:     ALPRAZolam (XANAX) 0.25 mg tablet, Take 1 tablet (0.25 mg total) by mouth 2 (two) times a day as needed for anxiety, Disp: 30 tablet, Rfl: 0    fluticasone (FLONASE) 50 mcg/act nasal " "spray, 2 sprays into each nostril daily (Patient taking differently: into each nostril as needed), Disp: 1 Bottle, Rfl: 11    Na Sulfate-K Sulfate-Mg Sulf 17.5-3.13-1.6 GM/177ML SOLN, Take as directed, Disp: , Rfl:     PreviDent 5000 Booster Plus 1.1 % PSTE, As directed, Disp: , Rfl:     Sulfacetamide Sodium, Acne, 10 % LOTN, APPLY TOPICALLY TO FACE EVERY MORNING, Disp: , Rfl:     tretinoin (REFISSA) 0.05 % cream, APPLY PEA SIZED AMOUNT TO ENTIRE FACE EVERY NIGHT AT BEDTIME 1 HOUR AFTER WASHING FACE, Disp: , Rfl:     Allergies   Allergen Reactions    Epinephrine Other (See Comments)     Hyperventilate- flushed and palpitations    Erythromycin Other (See Comments)     Gave pt cdiff    Levofloxacin Other (See Comments)     Lymphadenopathy - rash and hives    Iv Dye [Iodinated Contrast Media] Other (See Comments)     Hyperventilated - flushed - \"had to reverse it\" -had palpitations     Procaine Other (See Comments)     Annotation - [id number]: When used with Epi- hyperventilated, flushed and palpitations.     Ampicillin Other (See Comments)     RASH    Moxifloxacin Rash     Swelling in lymphnodes    Penicillins Rash       Social History   Past Surgical History:   Procedure Laterality Date    BLEPHAROPLASTY Bilateral     upper bleph bilateral    BLEPHAROPLASTY Bilateral 2022    Procedure: BLEPHAROPLASTY LOWER;  Surgeon: Antolin Woo MD;  Location:  MAIN OR;  Service: Plastics    BROW LIFT      Tacks placed in and dissolve     SECTION  1993    x1    EPIDURAL BLOCK INJECTION      back    EXPLORATORY LAPAROTOMY      HERNIA REPAIR Right     Last Assessed:  4/17/15- pt reports right femoral    HYSTERECTOMY      Partial    ROTATOR CUFF REPAIR      Last Assessed:  4/17/15    SEPTOPLASTY      SHOULDER SURGERY Left     Pt reports left rotator cuff surgery      Family History   Problem Relation Age of Onset    Lupus Mother     Other Mother         Rheumatic disease    Diabetes Mother     Lung cancer Father " "66        smoker    Blindness Brother     Heart attack Brother     Stroke Brother         half brother    Prostate cancer Brother     No Known Problems Maternal Grandmother     No Known Problems Maternal Grandfather     No Known Problems Paternal Grandmother     No Known Problems Paternal Grandfather     No Known Problems Son     No Known Problems Son        Objective:  /66 (BP Location: Left arm, Patient Position: Sitting, Cuff Size: Standard)   Pulse 78   Temp (!) 97.3 °F (36.3 °C) (Temporal)   Ht 5' 0.71\" (1.542 m)   Wt 47.9 kg (105 lb 9.6 oz)   LMP  (LMP Unknown) Comment: Partial hysterectomy per pt  SpO2 94%   BMI 20.14 kg/m²              Physical Exam  Constitutional:       General: She is not in acute distress.     Appearance: She is well-developed. She is not diaphoretic.   HENT:      Head: Normocephalic and atraumatic.      Right Ear: External ear normal.      Left Ear: External ear normal.      Nose: Nose normal.      Mouth/Throat:      Mouth: Mucous membranes are moist.      Pharynx: No oropharyngeal exudate or posterior oropharyngeal erythema.   Eyes:      General:         Right eye: No discharge.         Left eye: No discharge.      Conjunctiva/sclera: Conjunctivae normal.      Pupils: Pupils are equal, round, and reactive to light.   Neck:      Thyroid: No thyromegaly.   Cardiovascular:      Rate and Rhythm: Normal rate and regular rhythm.      Heart sounds: Normal heart sounds. No murmur heard.     No friction rub. No gallop.   Pulmonary:      Effort: Pulmonary effort is normal. No respiratory distress.      Breath sounds: Normal breath sounds. No stridor. No wheezing or rales.   Abdominal:      General: Bowel sounds are normal. There is no distension.      Palpations: Abdomen is soft.      Tenderness: There is no abdominal tenderness.   Musculoskeletal:      Cervical back: Normal range of motion and neck supple.   Lymphadenopathy:      Cervical: No cervical adenopathy.   Skin:     " General: Skin is warm and dry.      Findings: No erythema or rash.   Neurological:      Mental Status: She is alert and oriented to person, place, and time.   Psychiatric:         Behavior: Behavior normal.         Thought Content: Thought content normal.         Judgment: Judgment normal.

## 2024-10-02 ENCOUNTER — TELEPHONE (OUTPATIENT)
Age: 64
End: 2024-10-02

## 2024-10-02 DIAGNOSIS — Z13.228 SCREENING FOR METABOLIC DISORDER: Primary | ICD-10-CM

## 2024-10-02 DIAGNOSIS — E78.5 HYPERLIPIDEMIA, UNSPECIFIED HYPERLIPIDEMIA TYPE: ICD-10-CM

## 2024-10-02 DIAGNOSIS — E03.9 HYPOTHYROIDISM, UNSPECIFIED TYPE: ICD-10-CM

## 2024-10-02 DIAGNOSIS — R79.89 ABNORMAL TSH: ICD-10-CM

## 2024-10-02 NOTE — TELEPHONE ENCOUNTER
Please order labs for pt's upcoming appt on 11/19 and contact to advise once completed; pt stated she needs to complete after 11/2 for her insurance to cover.

## 2024-11-04 ENCOUNTER — APPOINTMENT (OUTPATIENT)
Dept: LAB | Facility: IMAGING CENTER | Age: 64
End: 2024-11-04
Payer: COMMERCIAL

## 2024-11-04 DIAGNOSIS — Z13.228 SCREENING FOR METABOLIC DISORDER: ICD-10-CM

## 2024-11-04 LAB
ALBUMIN SERPL BCG-MCNC: 4.5 G/DL (ref 3.5–5)
ALP SERPL-CCNC: 51 U/L (ref 34–104)
ALT SERPL W P-5'-P-CCNC: 27 U/L (ref 7–52)
ANION GAP SERPL CALCULATED.3IONS-SCNC: 5 MMOL/L (ref 4–13)
AST SERPL W P-5'-P-CCNC: 23 U/L (ref 13–39)
BASOPHILS # BLD AUTO: 0.02 THOUSANDS/ΜL (ref 0–0.1)
BASOPHILS NFR BLD AUTO: 0 % (ref 0–1)
BILIRUB SERPL-MCNC: 0.58 MG/DL (ref 0.2–1)
BUN SERPL-MCNC: 13 MG/DL (ref 5–25)
CALCIUM SERPL-MCNC: 9.4 MG/DL (ref 8.4–10.2)
CHLORIDE SERPL-SCNC: 104 MMOL/L (ref 96–108)
CHOLEST SERPL-MCNC: 164 MG/DL
CO2 SERPL-SCNC: 29 MMOL/L (ref 21–32)
CREAT SERPL-MCNC: 0.6 MG/DL (ref 0.6–1.3)
EOSINOPHIL # BLD AUTO: 0.07 THOUSAND/ΜL (ref 0–0.61)
EOSINOPHIL NFR BLD AUTO: 1 % (ref 0–6)
ERYTHROCYTE [DISTWIDTH] IN BLOOD BY AUTOMATED COUNT: 11.8 % (ref 11.6–15.1)
GFR SERPL CREATININE-BSD FRML MDRD: 98 ML/MIN/1.73SQ M
GLUCOSE P FAST SERPL-MCNC: 94 MG/DL (ref 65–99)
HCT VFR BLD AUTO: 40.1 % (ref 34.8–46.1)
HDLC SERPL-MCNC: 62 MG/DL
HGB BLD-MCNC: 13.4 G/DL (ref 11.5–15.4)
IMM GRANULOCYTES # BLD AUTO: 0.01 THOUSAND/UL (ref 0–0.2)
IMM GRANULOCYTES NFR BLD AUTO: 0 % (ref 0–2)
LDLC SERPL CALC-MCNC: 84 MG/DL (ref 0–100)
LYMPHOCYTES # BLD AUTO: 1.83 THOUSANDS/ΜL (ref 0.6–4.47)
LYMPHOCYTES NFR BLD AUTO: 29 % (ref 14–44)
MCH RBC QN AUTO: 32.4 PG (ref 26.8–34.3)
MCHC RBC AUTO-ENTMCNC: 33.4 G/DL (ref 31.4–37.4)
MCV RBC AUTO: 97 FL (ref 82–98)
MONOCYTES # BLD AUTO: 0.55 THOUSAND/ΜL (ref 0.17–1.22)
MONOCYTES NFR BLD AUTO: 9 % (ref 4–12)
NEUTROPHILS # BLD AUTO: 3.8 THOUSANDS/ΜL (ref 1.85–7.62)
NEUTS SEG NFR BLD AUTO: 61 % (ref 43–75)
NONHDLC SERPL-MCNC: 102 MG/DL
NRBC BLD AUTO-RTO: 0 /100 WBCS
PLATELET # BLD AUTO: 240 THOUSANDS/UL (ref 149–390)
PMV BLD AUTO: 12.1 FL (ref 8.9–12.7)
POTASSIUM SERPL-SCNC: 4.9 MMOL/L (ref 3.5–5.3)
PROT SERPL-MCNC: 6.9 G/DL (ref 6.4–8.4)
RBC # BLD AUTO: 4.14 MILLION/UL (ref 3.81–5.12)
SODIUM SERPL-SCNC: 138 MMOL/L (ref 135–147)
TRIGL SERPL-MCNC: 89 MG/DL
TSH SERPL DL<=0.05 MIU/L-ACNC: 0.9 UIU/ML (ref 0.45–4.5)
WBC # BLD AUTO: 6.28 THOUSAND/UL (ref 4.31–10.16)

## 2024-11-04 PROCEDURE — 36415 COLL VENOUS BLD VENIPUNCTURE: CPT

## 2024-11-04 PROCEDURE — 80053 COMPREHEN METABOLIC PANEL: CPT

## 2024-11-19 ENCOUNTER — OFFICE VISIT (OUTPATIENT)
Age: 64
End: 2024-11-19
Payer: COMMERCIAL

## 2024-11-19 VITALS
HEART RATE: 83 BPM | OXYGEN SATURATION: 98 % | DIASTOLIC BLOOD PRESSURE: 58 MMHG | HEIGHT: 61 IN | WEIGHT: 102 LBS | TEMPERATURE: 98.6 F | SYSTOLIC BLOOD PRESSURE: 94 MMHG | BODY MASS INDEX: 19.26 KG/M2

## 2024-11-19 DIAGNOSIS — Z23 NEED FOR COVID-19 VACCINE: ICD-10-CM

## 2024-11-19 DIAGNOSIS — Z23 ENCOUNTER FOR IMMUNIZATION: Primary | ICD-10-CM

## 2024-11-19 DIAGNOSIS — M85.89 OSTEOPENIA OF MULTIPLE SITES: ICD-10-CM

## 2024-11-19 DIAGNOSIS — Z00.00 ANNUAL PHYSICAL EXAM: ICD-10-CM

## 2024-11-19 PROCEDURE — 91320 SARSCV2 VAC 30MCG TRS-SUC IM: CPT

## 2024-11-19 PROCEDURE — 90673 RIV3 VACCINE NO PRESERV IM: CPT

## 2024-11-19 PROCEDURE — 90471 IMMUNIZATION ADMIN: CPT

## 2024-11-19 PROCEDURE — 99396 PREV VISIT EST AGE 40-64: CPT | Performed by: NURSE PRACTITIONER

## 2024-11-19 PROCEDURE — 90480 ADMN SARSCOV2 VAC 1/ONLY CMP: CPT

## 2024-11-19 RX ORDER — ESTRADIOL 0.05 MG/D
PATCH, EXTENDED RELEASE TRANSDERMAL
COMMUNITY
Start: 2024-11-07

## 2024-11-19 RX ORDER — PROGESTERONE 200 MG/1
CAPSULE ORAL
COMMUNITY
Start: 2024-11-08

## 2024-11-19 NOTE — PROGRESS NOTES
[facility] - [facility] [place]  Well Adult Female Physical Visit  Patient ID: Tiffany Hays    : 1960  Age/Gender: 64 y.o. female     DATE: 2024      Assessment/Plan:    Annual physical exam  -up to date with fasting blood work   -Continue with well-balanced diet, encouraged daily exercise  -She is up-to-date with mammogram, colonoscopy and cervical cancer screening  -She received COVID and influenza vaccination while in office today  -Encourage monthly self breast examination  -Follow-up in 1 year for annual physical or sooner if needed       Diagnoses and all orders for this visit:    Encounter for immunization  -     influenza vaccine, recombinant, PF, 0.5 mL IM (Flublok)    Need for COVID-19 vaccine  -     COVID-19 Pfizer mRNA vaccine 12 yr and older (Comirnaty pre-filled syringe)    Osteopenia of multiple sites  -     DXA bone density spine hip and pelvis; Future    Annual physical exam    Other orders  -     estradiol (VIVELLE-DOT) 0.05 MG/24HR; APPLY 1 PATCH ON THE SKIN 2 TIMES WEEKLY  -     Progesterone 200 MG CAPS; TAKE 1 CAPSULE BY MOUTH NIGHTLY TAKE ONE TABLET PRIOR TO BED EACH NIGHT  -     patient supplied medication; 2 each in the morning Testerone cream 2%            Subjective:     Tiffany Hays is a 64 y.o. female who presents to the office on 2024 for a health maintenance physical.    HPI  The following portions of the patient's history were reviewed and updated as appropriate: allergies, current medications, past family history, past medical history, past social history, past surgical history, and problem list.    Pt reports overall health:  good  Last Physical: last year  Last GYN Exam:  yearly    Healthy Diet:  well balanced   Routine Exercise:  5 days a week  Weight Concerns:  denies    Problems with vision: trouble with reading  Last Eye Exam:  has been a few years     Problems with Hearing:  denies    Routine Dental Exams:   every 6 months    Smoking History:  denies  ETOH Use:  socially  Illegal Drug Use:  denies  Caffeine Use:  two cup of coffee a day     Reproductive Health:    Last PAP:  s/p hysterectomy   History of abnormal PAP: denies  Vaginal Discharge:  denies    Last Mammo: 2024  History of abnormal Mammo: denies  Self Breast Exams: monthly     Depression Screening:  PHQ-2/9 Depression Screening               Last Colonoscopy:  2024  History of abnormal Colonoscopy:  denies  Family History of Colon CA:  denies     Last Dexa: 2023    Last Labs:  2024    Review of Systems   Constitutional:  Negative for activity change, appetite change, chills, diaphoresis and fever.   HENT:  Negative for congestion, ear discharge, ear pain, postnasal drip, rhinorrhea, sinus pressure, sinus pain and sore throat.    Eyes:  Negative for pain, discharge, itching and visual disturbance.   Respiratory:  Negative for cough, chest tightness, shortness of breath and wheezing.    Cardiovascular:  Negative for chest pain, palpitations and leg swelling.   Gastrointestinal:  Negative for abdominal pain, blood in stool, constipation, diarrhea, nausea and vomiting.   Endocrine: Negative for polydipsia, polyphagia and polyuria.   Genitourinary:  Negative for difficulty urinating, dysuria, hematuria and urgency.   Musculoskeletal:  Negative for arthralgias, back pain and neck pain.   Skin:  Negative for rash and wound.   Neurological:  Negative for dizziness, weakness, numbness and headaches.         Patient Active Problem List   Diagnosis    Anxiety    Cervical radiculopathy    Cervical spondylosis without myelopathy    Dense breasts    Chronic neck pain    Lipids abnormal    Lumbosacral spondylosis without myelopathy    Primary insomnia    Annual physical exam    Dermatochalasis    Bartholin's gland abscess    Atrophic vaginitis    Current use of estrogen therapy    Protein-calorie malnutrition, unspecified severity (HCC)    Osteopenia of multiple sites     "PONV (postoperative nausea and vomiting)    History of hysterectomy    Delayed emergence from anesthesia       Past Medical History:   Diagnosis Date    Anxiety     Arthritis     Last Assessed:  4/17/15    Family history of anesthesia complication     2/3/22 Pt did report her mother \"having difficulty coming out of anesthesia\" - no specific issues mentioned    Herniated disc, cervical     Hot flashes 2017    Lipids abnormal     Last Assessed:  16    Malaise and fatigue 2012    Menopausal symptoms 2016    Last Assessment & Plan:  Endorses hot flashes, night sweats, mood lability, difficulty sleeping, vaginal dryness and low libido. Discussed that these symptoms appear consistent with menopause. We discussed options for management including lifestyle changes, OTC medications, moisturizers and lubricants, such as coconut oil. We discussed pharmacologic treatments such as SSRI's, vaginal estrogen and     PONV (postoperative nausea and vomiting)     Shoulder joint pain 2018    Spinal stenosis     Strain of supraspinatus muscle or tendon 2018    Vaginal delivery     Pt reports one V back birth after        Past Surgical History:   Procedure Laterality Date    BLEPHAROPLASTY Bilateral     upper bleph bilateral    BLEPHAROPLASTY Bilateral 2022    Procedure: BLEPHAROPLASTY LOWER;  Surgeon: Antolin Woo MD;  Location:  MAIN OR;  Service: Plastics    BROW LIFT      Tacks placed in and dissolve     SECTION  1993    x1    EPIDURAL BLOCK INJECTION      back    EXPLORATORY LAPAROTOMY      HERNIA REPAIR Right     Last Assessed:  4/17/15- pt reports right femoral    HYSTERECTOMY      Partial    ROTATOR CUFF REPAIR      Last Assessed:  4/17/15    SEPTOPLASTY      SHOULDER SURGERY Left     Pt reports left rotator cuff surgery          Current Outpatient Medications:     ALPRAZolam (XANAX) 0.25 mg tablet, Take 1 tablet (0.25 mg total) by mouth 2 (two) times a day as needed for " "anxiety, Disp: 30 tablet, Rfl: 0    estradiol (VIVELLE-DOT) 0.05 MG/24HR, APPLY 1 PATCH ON THE SKIN 2 TIMES WEEKLY, Disp: , Rfl:     fluticasone (FLONASE) 50 mcg/act nasal spray, 2 sprays into each nostril daily, Disp: 1 Bottle, Rfl: 11    Na Sulfate-K Sulfate-Mg Sulf 17.5-3.13-1.6 GM/177ML SOLN, Take as directed, Disp: , Rfl:     patient supplied medication, 2 each in the morning Testerone cream 2%, Disp: , Rfl:     PreviDent 5000 Booster Plus 1.1 % PSTE, As directed, Disp: , Rfl:     Progesterone 200 MG CAPS, TAKE 1 CAPSULE BY MOUTH NIGHTLY TAKE ONE TABLET PRIOR TO BED EACH NIGHT, Disp: , Rfl:     Sulfacetamide Sodium, Acne, 10 % LOTN, APPLY TOPICALLY TO FACE EVERY MORNING, Disp: , Rfl:     tretinoin (REFISSA) 0.05 % cream, APPLY PEA SIZED AMOUNT TO ENTIRE FACE EVERY NIGHT AT BEDTIME 1 HOUR AFTER WASHING FACE, Disp: , Rfl:     Allergies   Allergen Reactions    Epinephrine Other (See Comments)     Hyperventilate- flushed and palpitations    Erythromycin Other (See Comments)     Gave pt cdiff    Levofloxacin Other (See Comments)     Lymphadenopathy - rash and hives    Iv Dye [Iodinated Contrast Media] Other (See Comments)     Hyperventilated - flushed - \"had to reverse it\" -had palpitations     Procaine Other (See Comments)     Annotation - [id number]: When used with Epi- hyperventilated, flushed and palpitations.     Ampicillin Other (See Comments)     RASH    Moxifloxacin Rash     Swelling in lymphnodes    Penicillins Rash       Social History     Socioeconomic History    Marital status: /Civil Union     Spouse name: None    Number of children: 2    Years of education: None    Highest education level: None   Occupational History    Occupation: Physical therapist   Tobacco Use    Smoking status: Never    Smokeless tobacco: Never    Tobacco comments:     Denies   Vaping Use    Vaping status: Never Used   Substance and Sexual Activity    Alcohol use: Yes     Alcohol/week: 1.0 - 2.0 standard drink of alcohol     " Types: 1 - 2 Standard drinks or equivalent per week     Comment: Socially- 4x/week    Drug use: No     Comment: Denies     Sexual activity: Yes     Partners: Male     Birth control/protection: Surgical     Comment: Denies any chest pain or shortness of breath with activity    Other Topics Concern    None   Social History Narrative    Caffeine use:  She admits to consuming caffeine via coffee (1 serving per day)    Exercises regularly:  Primary form of exercise is aerobics and weightlifting.  Frequency is 3-5 times per week    Per [employer]:       Social Drivers of Health     Financial Resource Strain: Not on file   Food Insecurity: Not on file   Transportation Needs: Not on file   Physical Activity: Not on file   Stress: Not on file   Social Connections: Not on file   Intimate Partner Violence: Not on file   Housing Stability: Not on file       Family History   Problem Relation Age of Onset    Lupus Mother     Other Mother         Rheumatic disease    Diabetes Mother     Lung cancer Father 66        smoker    Blindness Brother     Heart attack Brother     Stroke Brother         half brother    Prostate cancer Brother     No Known Problems Maternal Grandmother     No Known Problems Maternal Grandfather     No Known Problems Paternal Grandmother     No Known Problems Paternal Grandfather     No Known Problems Son     No Known Problems Son        Immunization History   Administered Date(s) Administered    COVID-19 MODERNA VACC 0.5 ML IM 01/05/2021, 02/05/2021, 11/19/2021, 04/22/2022    COVID-19 Moderna Vac BIVALENT 12 Yr+ IM 0.5 ML 11/13/2022    COVID-19 Pfizer mRNA vacc PF blanka-sucrose 12 yr and older (Comirnaty) 11/19/2024    INFLUENZA 10/01/2007, 10/20/2019, 10/15/2020, 11/13/2022    Influenza Recombinant Preservative Free Im 11/19/2024    Influenza, injectable, quadrivalent, preservative free 0.5 mL 10/25/2023    Influenza, seasonal, injectable 10/09/2014    Influenza, seasonal, injectable, preservative  "free 10/20/2019    Tdap 08/05/2016    Zoster Vaccine Recombinant 09/02/2020, 11/10/2020    influenza, injectable, quadrivalent 10/15/2020        Health Maintenance   Topic Date Due    Annual Physical  10/25/2024    Depression Screening  08/13/2025    Breast Cancer Screening: Mammogram  09/23/2025    DTaP,Tdap,and Td Vaccines (2 - Td or Tdap) 08/05/2026    Colorectal Cancer Screening  05/22/2034    RSV Vaccine Age 60+ Years (1 - 1-dose 75+ series) 07/20/2037    HIV Screening  Completed    Hepatitis C Screening  Completed    Zoster Vaccine  Completed    Influenza Vaccine  Completed    COVID-19 Vaccine  Completed    RSV Vaccine age 0-20 Months  Aged Out    Pneumococcal Vaccine: Pediatrics (0 to 5 Years) and At-Risk Patients (6 to 64 Years)  Aged Out    HIB Vaccine  Aged Out    IPV Vaccine  Aged Out    Hepatitis A Vaccine  Aged Out    Meningococcal ACWY Vaccine  Aged Out    HPV Vaccine  Aged Out         Objective:  Vitals:    11/19/24 0722   BP: 94/58   BP Location: Left arm   Patient Position: Sitting   Cuff Size: Standard   Pulse: 83   Temp: 98.6 °F (37 °C)   TempSrc: Temporal   SpO2: 98%   Weight: 46.3 kg (102 lb)   Height: 5' 0.63\" (1.54 m)       Wt Readings from Last 3 Encounters:   11/19/24 46.3 kg (102 lb)   08/13/24 47.9 kg (105 lb 9.6 oz)   05/24/24 45.4 kg (100 lb)     Body mass index is 19.51 kg/m².  No results found.       Physical Exam  Constitutional:       General: She is not in acute distress.     Appearance: She is well-developed. She is not diaphoretic.   HENT:      Head: Normocephalic and atraumatic.      Right Ear: External ear normal.      Left Ear: External ear normal.      Nose: Nose normal.      Mouth/Throat:      Mouth: Mucous membranes are moist.      Pharynx: No oropharyngeal exudate or posterior oropharyngeal erythema.   Eyes:      General:         Right eye: No discharge.         Left eye: No discharge.      Conjunctiva/sclera: Conjunctivae normal.      Pupils: Pupils are equal, round, and " reactive to light.   Neck:      Thyroid: No thyromegaly.   Cardiovascular:      Rate and Rhythm: Normal rate and regular rhythm.      Heart sounds: Normal heart sounds. No murmur heard.     No friction rub. No gallop.   Pulmonary:      Effort: Pulmonary effort is normal. No respiratory distress.      Breath sounds: Normal breath sounds. No stridor. No wheezing or rales.   Abdominal:      General: Bowel sounds are normal. There is no distension.      Palpations: Abdomen is soft.      Tenderness: There is no abdominal tenderness.   Musculoskeletal:      Cervical back: Normal range of motion and neck supple.   Lymphadenopathy:      Cervical: No cervical adenopathy.   Skin:     General: Skin is warm and dry.      Findings: No erythema or rash.   Neurological:      Mental Status: She is alert and oriented to person, place, and time.   Psychiatric:         Behavior: Behavior normal.         Thought Content: Thought content normal.         Judgment: Judgment normal.             Future Appointments   Date Time Provider Department Center   11/21/2025  7:00 AM RISHI Norton [facility]       RISHI Norton  [facility] [place]    Patient Care Team:  RISHI Norton as PCP - General (Family Medicine)  Eugenio Sarah MD (Pain Medicine)  Mandeep Aquino MD (Obstetrics and Gynecology)

## 2024-11-19 NOTE — ASSESSMENT & PLAN NOTE
-up to date with fasting blood work   -Continue with well-balanced diet, encouraged daily exercise  -She is up-to-date with mammogram, colonoscopy and cervical cancer screening  -She received COVID and influenza vaccination while in office today  -Encourage monthly self breast examination  -Follow-up in 1 year for annual physical or sooner if needed

## 2024-12-03 ENCOUNTER — TELEPHONE (OUTPATIENT)
Age: 64
End: 2024-12-03

## 2024-12-03 NOTE — TELEPHONE ENCOUNTER
Patient had called in and mentioned she had blood work completed for her physical that was on the 19th,and received a bill for the blood work.     She spoke to her insurance company, and they mentioned that the coding was wrong, and needs to be changed and resubmitted. Please advise out to patient in regards to this

## 2024-12-10 NOTE — TELEPHONE ENCOUNTER
Sent message to [facility] with updated codes on bloodwork.  Instructed them to resubmit to insurance.  Called and informed pt it could take 30-45 days to receive EOB and to keep us informed of any further issues.  Pt verbally acknowledged understanding

## 2024-12-11 ENCOUNTER — TELEMEDICINE (OUTPATIENT)
Dept: OTHER | Facility: HOSPITAL | Age: 64
End: 2024-12-11
Payer: COMMERCIAL

## 2024-12-11 DIAGNOSIS — H10.32 ACUTE BACTERIAL CONJUNCTIVITIS OF LEFT EYE: Primary | ICD-10-CM

## 2024-12-11 PROBLEM — N75.1 BARTHOLIN'S GLAND ABSCESS: Status: RESOLVED | Noted: 2022-04-04 | Resolved: 2024-12-11

## 2024-12-11 PROBLEM — N95.2 ATROPHIC VAGINITIS: Status: RESOLVED | Noted: 2022-04-13 | Resolved: 2024-12-11

## 2024-12-11 PROBLEM — Z00.00 ANNUAL PHYSICAL EXAM: Status: RESOLVED | Noted: 2018-11-29 | Resolved: 2024-12-11

## 2024-12-11 PROCEDURE — 99213 OFFICE O/P EST LOW 20 MIN: CPT | Performed by: PHYSICIAN ASSISTANT

## 2024-12-11 RX ORDER — SULFACETAMIDE SODIUM 100 MG/ML
1 SOLUTION/ DROPS OPHTHALMIC
Qty: 15 ML | Refills: 0 | Status: SHIPPED | OUTPATIENT
Start: 2024-12-11 | End: 2024-12-18

## 2024-12-11 NOTE — PROGRESS NOTES
Virtual Regular Visit  Name: Tiffany Hays      : 1960      MRN: [id number]  Encounter Provider: Shannon D Severino, PA-C  Encounter Date: 2024   Encounter department: VIRTUAL CARE       Verification of patient location:  Patient is located at Home in the following state in which I hold an active license [place] :  Assessment & Plan  Acute bacterial conjunctivitis of left eye    Orders:    sulfacetamide (BLEPH-10) 10 % ophthalmic solution; Administer 1 drop into the left eye every 3 (three) hours for 7 days        Encounter provider Shannon D Severino, PA-C    The patient was identified by name and date of birth. Tiffany Hays was informed that this is a telemedicine visit and that the visit is being conducted through the Epic Embedded platform. She agrees to proceed..  My office door was closed. No one else was in the room.  She acknowledged consent and understanding of privacy and security of the video platform. The patient has agreed to participate and understands they can discontinue the visit at any time.    Patient is aware this is a billable service.     History was obtained from: History obtained from: patient  History of Present Illness     Pt reports L eye was weepy and itcy yesterday. Today woke up with crusting and pink eye. Nothing tried. No eye pain, changes to vision. No FB or injury.       Review of Systems   Constitutional:  Negative for fever.   HENT:  Negative for nosebleeds.    Eyes:  Positive for discharge, redness and itching. Negative for photophobia, pain and visual disturbance.   Respiratory:  Negative for shortness of breath.    Cardiovascular:  Negative for chest pain.   Gastrointestinal:  Negative for blood in stool.   Genitourinary:  Negative for hematuria.   Musculoskeletal:  Negative for gait problem.   Skin:  Negative for rash.   Neurological:  Negative for seizures.   Psychiatric/Behavioral:  Negative for behavioral problems.        Objective   LMP  (LMP Unknown)  Comment: Partial hysterectomy per pt    Physical Exam  Constitutional:       General: She is not in acute distress.     Appearance: Normal appearance. She is not toxic-appearing.   HENT:      Head: Normocephalic and atraumatic.      Nose: No rhinorrhea.      Mouth/Throat:      Mouth: Mucous membranes are moist.   Eyes:      General:         Right eye: No discharge.         Left eye: Discharge present.     Conjunctiva/sclera:      Left eye: Left conjunctiva is injected.   Pulmonary:      Effort: Pulmonary effort is normal. No respiratory distress.      Breath sounds: No wheezing (no gross audible wheeze through computer).   Musculoskeletal:      Cervical back: Normal range of motion.   Skin:     Findings: No rash (on face or neck).   Neurological:      Mental Status: She is alert.      Cranial Nerves: No dysarthria or facial asymmetry.   Psychiatric:         Mood and Affect: Mood normal.         Behavior: Behavior normal.         Visit Time  Total Visit Duration: 9 minutes not including the time spent for establishing the audio/video connection.

## 2024-12-11 NOTE — PATIENT INSTRUCTIONS
"[facility] Phone number is [phone] if you need assistance or have further questions    Patient Education     Conjunctivitis (pink eye)   The Basics   Written by the doctors and editors at [facility]   What is pink eye? -- Pink eye is a term people use to describe an infection or irritation of the eye. The medical term for pink eye is \"conjunctivitis.\"  If you have pink eye, your eye (or eyes) might:   Turn pink or red   Weep or ooze a gooey liquid   Become itchy or burn   Get stuck shut, especially when you first wake up  Pink eye can be caused by an infection, allergies, or an unknown irritation.  Can you catch pink eye from someone else? -- Yes. When pink eye is caused by an infection, it can spread easily. Usually, people catch it from touching something that has been in contact with an infected person's eye. It can also be spread when an infected person touches someone else, and then that person touches their eye.  If someone you know has pink eye, avoid touching their pillowcases, towels, or other personal items.  When should I see a doctor or nurse? -- See your doctor or nurse if your eye hurts, or if you still have trouble seeing clearly after blinking. If you do not have these problems, but think you might have pink eye, your doctor or nurse might be able to give you advice over the phone.  Can pink eye be treated? -- Most cases of pink eye go away on their own without treatment. But some types of pink eye can be treated.  When pink eye is caused by infection, it is usually caused by a virus, so antibiotics will not help. Still, pink eye caused by a virus can last several days.   Pink eye caused by an infection with bacteria can be treated with antibiotic eye drops, gel, or ointment.   Pink eye caused by other problems can be treated with eye drops normally used to treat allergies. These drops will not cure the pink eye, but they can help with itchiness and irritation.  When using eye drops for " "infection, do not touch your healthy eye after touching your infected eye. Also, do not touch the bottle or dropper directly onto 1 eye and then use it in the other. These things can cause the infection to spread from 1 eye to the other.  If your eyelids feel swollen, it might also help to hold a cool wet cloth on the area.  What if I wear contact lenses? -- If you wear contact lenses and you have symptoms of pink eye, it is really important to have a doctor look at your eyes. In people who wear contacts, the symptoms of pink eye can be caused by \"corneal abrasion.\" Corneal abrasion is a scratch on the eye and can be a serious problem.  During treatment for eye infections, you might need to stop wearing your contacts for a short time. If your contacts are disposable, throw them away and use new ones. If your contacts are not disposable, you need to carefully clean them. You should also throw away your contact lens case and get a new one.  When can I go back to work or school? -- If you have pink eye caused by an infection, remember that it can spread very easily. The best way to avoid spreading it is to stay away from other people until you no longer have symptoms. If this is not possible, wash your hands often (figure 1). It's also important to avoid touching your eyes and sharing items that could spread the infection.  Schools and day cares usually have rules about when a child with pink eye can return. If a child has a bacterial infection, they will probably need to stay home until they have gotten antibiotic eye drops or ointment for 24 hours.  Can pink eye be prevented? -- To keep from getting or spreading pink eye caused by an infection:   Wash your hands often with soap and water.   Try not to touch your eyes.   Avoid sharing towels, bedding, or other personal items with a person who has pink eye.  If your pink eye is caused by allergies, it might help to stay inside with the windows shut as much as possible " during peak allergy seasons.  What problems should I watch for? -- Call your doctor or nurse if:   You have trouble seeing clearly after blinking.   Your eye is still red or has drainage after 3 days.   You have eye pain that is getting worse.  All topics are updated as new evidence becomes available and our peer review process is complete.  This topic retrieved from [employer] on: Feb 28, 2024.  Topic 91730 Version 20.0  Release: 32.2.4 - C32.58  © 2024 UpToDate, Inc. and/or its affiliates. All rights reserved.  figure 1: How to wash your hands     Wet your hands with clean water, and apply a small amount of soap. Lather and rub hands together for at least 20 seconds. Clean your wrists, palms, backs of your hands, between your fingers, tips of your fingers, thumbs, and under and around your nails. Rinse well, and dry your hands using a clean towel.  Graphic 174135 Version 7.0  Consumer Information Use and Disclaimer   Disclaimer: This generalized information is a limited summary of diagnosis, treatment, and/or medication information. It is not meant to be comprehensive and should be used as a tool to help the user understand and/or assess potential diagnostic and treatment options. It does NOT include all information about conditions, treatments, medications, side effects, or risks that may apply to a specific patient. It is not intended to be medical advice or a substitute for the medical advice, diagnosis, or treatment of a health care provider based on the health care provider's examination and assessment of a patient's specific and unique circumstances. Patients must speak with a health care provider for complete information about their health, medical questions, and treatment options, including any risks or benefits regarding use of medications. This information does not endorse any treatments or medications as safe, effective, or approved for treating a specific patient. UpToDate, Inc. and its affiliates  disclaim any warranty or liability relating to this information or the use thereof.The use of this information is governed by the Terms of Use, available at https://www.wolterskluwer.com/en/know/clinical-effectiveness-terms. 2024© [employer] and its affiliates and/or licensors. All rights reserved.  Copyright   © 2024 [employer] and/or its affiliates. All rights reserved.

## 2025-01-25 ENCOUNTER — APPOINTMENT (OUTPATIENT)
Dept: LAB | Age: 65
End: 2025-01-25
Payer: COMMERCIAL

## 2025-01-25 DIAGNOSIS — L91.8 FIBROEPITHELIAL POLYP: ICD-10-CM

## 2025-01-25 DIAGNOSIS — Z01.810 PRE-OPERATIVE CARDIOVASCULAR EXAMINATION: ICD-10-CM

## 2025-01-25 DIAGNOSIS — Z01.812 PRE-OPERATIVE LABORATORY EXAMINATION: ICD-10-CM

## 2025-01-25 DIAGNOSIS — Z01.818 OTHER SPECIFIED PRE-OPERATIVE EXAMINATION: ICD-10-CM

## 2025-01-25 LAB
ANION GAP SERPL CALCULATED.3IONS-SCNC: 6 MMOL/L (ref 4–13)
ATRIAL RATE: 70 BPM
BUN SERPL-MCNC: 19 MG/DL (ref 5–25)
CALCIUM SERPL-MCNC: 10.1 MG/DL (ref 8.4–10.2)
CHLORIDE SERPL-SCNC: 104 MMOL/L (ref 96–108)
CO2 SERPL-SCNC: 30 MMOL/L (ref 21–32)
CREAT SERPL-MCNC: 0.72 MG/DL (ref 0.6–1.3)
ERYTHROCYTE [DISTWIDTH] IN BLOOD BY AUTOMATED COUNT: 11.6 % (ref 11.6–15.1)
GFR SERPL CREATININE-BSD FRML MDRD: 90 ML/MIN/1.73SQ M
GLUCOSE SERPL-MCNC: 95 MG/DL (ref 65–140)
HCT VFR BLD AUTO: 39 % (ref 34.8–46.1)
HGB BLD-MCNC: 12.7 G/DL (ref 11.5–15.4)
MCH RBC QN AUTO: 31.4 PG (ref 26.8–34.3)
MCHC RBC AUTO-ENTMCNC: 32.6 G/DL (ref 31.4–37.4)
MCV RBC AUTO: 96 FL (ref 82–98)
P AXIS: 77 DEGREES
PLATELET # BLD AUTO: 254 THOUSANDS/UL (ref 149–390)
PMV BLD AUTO: 11.4 FL (ref 8.9–12.7)
POTASSIUM SERPL-SCNC: 4.1 MMOL/L (ref 3.5–5.3)
PR INTERVAL: 124 MS
QRS AXIS: 69 DEGREES
QRSD INTERVAL: 80 MS
QT INTERVAL: 376 MS
QTC INTERVAL: 406 MS
RBC # BLD AUTO: 4.05 MILLION/UL (ref 3.81–5.12)
SODIUM SERPL-SCNC: 140 MMOL/L (ref 135–147)
T WAVE AXIS: 68 DEGREES
VENTRICULAR RATE: 70 BPM
WBC # BLD AUTO: 6.45 THOUSAND/UL (ref 4.31–10.16)

## 2025-01-25 PROCEDURE — 93010 ELECTROCARDIOGRAM REPORT: CPT | Performed by: INTERNAL MEDICINE

## 2025-01-25 PROCEDURE — 80048 BASIC METABOLIC PNL TOTAL CA: CPT

## 2025-01-25 PROCEDURE — 85027 COMPLETE CBC AUTOMATED: CPT

## 2025-01-25 PROCEDURE — 36415 COLL VENOUS BLD VENIPUNCTURE: CPT

## 2025-03-10 PROBLEM — L98.8 FACIAL RHYTIDS: Status: ACTIVE | Noted: 2025-03-10

## 2025-03-10 NOTE — PRE-PROCEDURE INSTRUCTIONS
Pre-Surgery Instructions:   Medication Instructions    estradiol (VIVELLE-DOT) 0.05 MG/24HR Take day of surgery.    patient supplied medication Take day of surgery.    Progesterone 200 MG CAPS Take night before surgery     Medication instructions for day of surgery reviewed. Please take all instructed medications with only a sip of water.       You will receive a call one business day prior to surgery with an arrival time and hospital directions. If your surgery is scheduled on a Monday, the hospital will be calling you on the Friday prior to your surgery. If you have not heard from anyone by 8pm, please call the hospital supervisor through the hospital  at [phone]. ([place] [phone] or [place] [phone]).    Do not eat or drink anything after midnight the night before your surgery, including candy, mints, lifesavers, or chewing gum. Do not drink alcohol 24hrs before your surgery. Try not to smoke at least 24hrs before your surgery.       Follow the pre surgery showering instructions as listed in the “My Surgical Experience Booklet” or otherwise provided by your surgeon's office. Do not use a blade to shave the surgical area 1 week before surgery. It is okay to use a clean electric clippers up to 24 hours before surgery. Do not apply any lotions, creams, including makeup, cologne, deodorant, or perfumes after showering on the day of your surgery. Do not use dry shampoo, hair spray, hair gel, or any type of hair products.     No contact lenses, eye make-up, or artificial eyelashes. Remove nail polish, including gel polish, and any artificial, gel, or acrylic nails if possible. Remove all jewelry including rings and body piercing jewelry.     Wear causal clothing that is easy to take on and off. Consider your type of surgery.    Keep any valuables, jewelry, piercings at home. Please bring any specially ordered equipment (sling, braces) if indicated.    Arrange for a responsible person to drive  you to and from the hospital on the day of your surgery. Please confirm the visitor policy for the day of your procedure when you receive your phone call with an arrival time.     Call the surgeon's office with any new illnesses, exposures, or additional questions prior to surgery.    Please reference your “My Surgical Experience Booklet” for additional information to prepare for your upcoming surgery.

## 2025-03-17 ENCOUNTER — ANESTHESIA EVENT (OUTPATIENT)
Dept: PERIOP | Facility: HOSPITAL | Age: 65
End: 2025-03-17
Payer: SELF-PAY

## 2025-03-18 ENCOUNTER — HOSPITAL ENCOUNTER (OUTPATIENT)
Facility: HOSPITAL | Age: 65
Setting detail: OUTPATIENT SURGERY
Discharge: HOME | End: 2025-03-18
Attending: SURGERY | Admitting: SURGERY
Payer: SELF-PAY

## 2025-03-18 ENCOUNTER — ANESTHESIA (OUTPATIENT)
Dept: PERIOP | Facility: HOSPITAL | Age: 65
End: 2025-03-18
Payer: SELF-PAY

## 2025-03-18 VITALS
BODY MASS INDEX: 20.22 KG/M2 | RESPIRATION RATE: 18 BRPM | SYSTOLIC BLOOD PRESSURE: 112 MMHG | WEIGHT: 103 LBS | HEIGHT: 60 IN | TEMPERATURE: 96.4 F | HEART RATE: 67 BPM | DIASTOLIC BLOOD PRESSURE: 74 MMHG | OXYGEN SATURATION: 95 %

## 2025-03-18 RX ORDER — PROMETHAZINE HYDROCHLORIDE 25 MG/ML
25 INJECTION, SOLUTION INTRAMUSCULAR; INTRAVENOUS ONCE AS NEEDED
Status: DISCONTINUED | OUTPATIENT
Start: 2025-03-18 | End: 2025-03-18 | Stop reason: HOSPADM

## 2025-03-18 RX ORDER — PROPOFOL 10 MG/ML
INJECTION, EMULSION INTRAVENOUS CONTINUOUS PRN
Status: DISCONTINUED | OUTPATIENT
Start: 2025-03-18 | End: 2025-03-18

## 2025-03-18 RX ORDER — OXYCODONE AND ACETAMINOPHEN 5; 325 MG/1; MG/1
1 TABLET ORAL ONCE AS NEEDED
Status: DISCONTINUED | OUTPATIENT
Start: 2025-03-18 | End: 2025-03-18 | Stop reason: HOSPADM

## 2025-03-18 RX ORDER — TRANEXAMIC ACID 10 MG/ML
INJECTION, SOLUTION INTRAVENOUS AS NEEDED
Status: DISCONTINUED | OUTPATIENT
Start: 2025-03-18 | End: 2025-03-18

## 2025-03-18 RX ORDER — PROPOFOL 10 MG/ML
INJECTION, EMULSION INTRAVENOUS AS NEEDED
Status: DISCONTINUED | OUTPATIENT
Start: 2025-03-18 | End: 2025-03-18

## 2025-03-18 RX ORDER — BALANCED SALT SOLUTION 6.4; .75; .48; .3; 3.9; 1.7 MG/ML; MG/ML; MG/ML; MG/ML; MG/ML; MG/ML
SOLUTION OPHTHALMIC AS NEEDED
Status: DISCONTINUED | OUTPATIENT
Start: 2025-03-18 | End: 2025-03-18 | Stop reason: HOSPADM

## 2025-03-18 RX ORDER — MINERAL OIL AND PETROLATUM 150; 830 MG/G; MG/G
OINTMENT OPHTHALMIC AS NEEDED
Status: DISCONTINUED | OUTPATIENT
Start: 2025-03-18 | End: 2025-03-18 | Stop reason: HOSPADM

## 2025-03-18 RX ORDER — FENTANYL CITRATE/PF 50 MCG/ML
25 SYRINGE (ML) INJECTION
Status: DISCONTINUED | OUTPATIENT
Start: 2025-03-18 | End: 2025-03-18 | Stop reason: HOSPADM

## 2025-03-18 RX ORDER — ACETAMINOPHEN 10 MG/ML
INJECTION, SOLUTION INTRAVENOUS AS NEEDED
Status: DISCONTINUED | OUTPATIENT
Start: 2025-03-18 | End: 2025-03-18

## 2025-03-18 RX ORDER — ONDANSETRON 2 MG/ML
4 INJECTION INTRAMUSCULAR; INTRAVENOUS ONCE AS NEEDED
Status: DISCONTINUED | OUTPATIENT
Start: 2025-03-18 | End: 2025-03-18 | Stop reason: HOSPADM

## 2025-03-18 RX ORDER — HYDROCODONE BITARTRATE AND ACETAMINOPHEN 5; 325 MG/1; MG/1
1 TABLET ORAL EVERY 4 HOURS PRN
Refills: 0 | Status: DISCONTINUED | OUTPATIENT
Start: 2025-03-18 | End: 2025-03-18 | Stop reason: HOSPADM

## 2025-03-18 RX ORDER — MIDAZOLAM HYDROCHLORIDE 2 MG/2ML
INJECTION, SOLUTION INTRAMUSCULAR; INTRAVENOUS AS NEEDED
Status: DISCONTINUED | OUTPATIENT
Start: 2025-03-18 | End: 2025-03-18

## 2025-03-18 RX ORDER — ROCURONIUM BROMIDE 10 MG/ML
INJECTION, SOLUTION INTRAVENOUS AS NEEDED
Status: DISCONTINUED | OUTPATIENT
Start: 2025-03-18 | End: 2025-03-18

## 2025-03-18 RX ORDER — SODIUM CHLORIDE, SODIUM LACTATE, POTASSIUM CHLORIDE, CALCIUM CHLORIDE 600; 310; 30; 20 MG/100ML; MG/100ML; MG/100ML; MG/100ML
INJECTION, SOLUTION INTRAVENOUS CONTINUOUS PRN
Status: DISCONTINUED | OUTPATIENT
Start: 2025-03-18 | End: 2025-03-18

## 2025-03-18 RX ORDER — LIDOCAINE HYDROCHLORIDE AND EPINEPHRINE 10; 10 MG/ML; UG/ML
INJECTION, SOLUTION INFILTRATION; PERINEURAL AS NEEDED
Status: DISCONTINUED | OUTPATIENT
Start: 2025-03-18 | End: 2025-03-18 | Stop reason: HOSPADM

## 2025-03-18 RX ORDER — DEXAMETHASONE SODIUM PHOSPHATE 10 MG/ML
INJECTION, SOLUTION INTRAMUSCULAR; INTRAVENOUS AS NEEDED
Status: DISCONTINUED | OUTPATIENT
Start: 2025-03-18 | End: 2025-03-18

## 2025-03-18 RX ORDER — HYDROCODONE BITARTRATE AND ACETAMINOPHEN 5; 325 MG/1; MG/1
2 TABLET ORAL EVERY 4 HOURS PRN
Refills: 0 | Status: DISCONTINUED | OUTPATIENT
Start: 2025-03-18 | End: 2025-03-18 | Stop reason: HOSPADM

## 2025-03-18 RX ORDER — PHENYLEPHRINE HCL IN 0.9% NACL 1 MG/10 ML
SYRINGE (ML) INTRAVENOUS AS NEEDED
Status: DISCONTINUED | OUTPATIENT
Start: 2025-03-18 | End: 2025-03-18

## 2025-03-18 RX ORDER — ONDANSETRON 2 MG/ML
INJECTION INTRAMUSCULAR; INTRAVENOUS AS NEEDED
Status: DISCONTINUED | OUTPATIENT
Start: 2025-03-18 | End: 2025-03-18

## 2025-03-18 RX ORDER — FENTANYL CITRATE 50 UG/ML
INJECTION, SOLUTION INTRAMUSCULAR; INTRAVENOUS AS NEEDED
Status: DISCONTINUED | OUTPATIENT
Start: 2025-03-18 | End: 2025-03-18

## 2025-03-18 RX ORDER — CEFAZOLIN SODIUM 2 G/50ML
2000 SOLUTION INTRAVENOUS ONCE
Status: COMPLETED | OUTPATIENT
Start: 2025-03-18 | End: 2025-03-18

## 2025-03-18 RX ORDER — LIDOCAINE HYDROCHLORIDE 10 MG/ML
INJECTION, SOLUTION EPIDURAL; INFILTRATION; INTRACAUDAL; PERINEURAL AS NEEDED
Status: DISCONTINUED | OUTPATIENT
Start: 2025-03-18 | End: 2025-03-18

## 2025-03-18 RX ADMIN — PROPOFOL 100 MCG/KG/MIN: 10 INJECTION, EMULSION INTRAVENOUS at 07:29

## 2025-03-18 RX ADMIN — LIDOCAINE HYDROCHLORIDE 50 MG: 10 SOLUTION INTRAVENOUS at 07:25

## 2025-03-18 RX ADMIN — Medication 100 MCG: at 10:22

## 2025-03-18 RX ADMIN — MIDAZOLAM 2 MG: 1 INJECTION INTRAMUSCULAR; INTRAVENOUS at 07:22

## 2025-03-18 RX ADMIN — SODIUM CHLORIDE, SODIUM LACTATE, POTASSIUM CHLORIDE, AND CALCIUM CHLORIDE: .6; .31; .03; .02 INJECTION, SOLUTION INTRAVENOUS at 07:02

## 2025-03-18 RX ADMIN — TRANEXAMIC ACID 1000 MG: 10 INJECTION, SOLUTION INTRAVENOUS at 07:34

## 2025-03-18 RX ADMIN — ROCURONIUM BROMIDE 50 MG: 10 INJECTION INTRAVENOUS at 07:26

## 2025-03-18 RX ADMIN — PROPOFOL 100 MG: 10 INJECTION, EMULSION INTRAVENOUS at 07:26

## 2025-03-18 RX ADMIN — DEXAMETHASONE SODIUM PHOSPHATE 10 MG: 10 INJECTION INTRAMUSCULAR; INTRAVENOUS at 07:32

## 2025-03-18 RX ADMIN — FENTANYL CITRATE 100 MCG: 50 INJECTION, SOLUTION INTRAMUSCULAR; INTRAVENOUS at 07:25

## 2025-03-18 RX ADMIN — CEFAZOLIN SODIUM 2000 MG: 2 SOLUTION INTRAVENOUS at 07:20

## 2025-03-18 RX ADMIN — Medication 100 MCG: at 10:09

## 2025-03-18 RX ADMIN — Medication 100 MCG: at 09:49

## 2025-03-18 RX ADMIN — HYDROCODONE BITARTRATE AND ACETAMINOPHEN 1 TABLET: 5; 325 TABLET ORAL at 12:21

## 2025-03-18 RX ADMIN — Medication 100 MCG: at 10:32

## 2025-03-18 RX ADMIN — ONDANSETRON 4 MG: 2 INJECTION INTRAMUSCULAR; INTRAVENOUS at 10:07

## 2025-03-18 RX ADMIN — SUGAMMADEX 100 MG: 100 INJECTION, SOLUTION INTRAVENOUS at 10:53

## 2025-03-18 RX ADMIN — ROCURONIUM BROMIDE 10 MG: 10 INJECTION INTRAVENOUS at 08:46

## 2025-03-18 RX ADMIN — ACETAMINOPHEN 1000 MG: 10 INJECTION INTRAVENOUS at 10:15

## 2025-03-18 NOTE — NURSING NOTE
Pt reported PRINCE drain filled with air. On assessment, drain filled with air, suction reapplied and suction was maintained with same bulb. Education given to patient with  at bedside, both verbalized understanding. Patient discharged in stable condition.

## 2025-03-18 NOTE — DISCHARGE INSTR - AVS FIRST PAGE
[facility]     [facility], [address] [place] [address], [address], [place] [address]   P [phone]/F [phone]/ [email or web address]     Home Instructions for the following procedures: Facelift, Necklift, Browlift     Please call the office today to schedule a post operative appointment, and tell the office staff  that you doctor needs see you in our office in 2-3 days for drain removal.    No smoking.    No aspiring or medication containing aspirin for a period for two weeks following surgery.    Keep ice on the wound for 48 hours (20 minutes on, 20 minutes off). A bag of frozen vegetables works great.    No excessive sun exposure for 6 weeks after surgery. The use of protective sunscreen lotions thereafter at all times will be necessary.    You may remove all head dressings the day after surgery. If you are coming to the office, it is recommended that you bring a headscarf with you.    You may shower and wash your hair the next day unless instructed otherwise by your doctor. Do not color or perm your hair for 4 weeks post surgery. Some areas of your scalp may be numb. Hair dryers should be kept on a cool setting to avoid being burned.    Gently move your neck from side to side and avoid any sudden movements.    Do not bend, lit or strain for 2 weeks postoperatively. try to keep your head elevated at all times.    Your swelling will increase for the first 48 hours and the will gradually subside. You may notice increased swelling in the morning; however, this will subside at the day goes on.    You might experience some tightness around the neck area, this is expected.    Slight signs of blood may show through the dressing. This is expected. Contact our office immediately if excessive swelling develops or if the dressings seem to tight.    Do not apply heat to the neck or face after surgery.    You will be given a prescription for a pain medicine. You can use extra  strength Tylenol, Advil or Aleve as a substitute.    We have spent considerable time and effort to make your surgical experience as efficient and pleasant as possible. We would appreciate your suggestions regarding any area of your care, which you think, could be improved to make your experience more pleasant.    If you have any questions, please call the office between 9:00 A.M. and 5:00 P.M.     If a problem should arise after hours, the doctor can be reached through the answering service at [phone]

## 2025-03-18 NOTE — ANESTHESIA POSTPROCEDURE EVALUATION
Post-Op Assessment Note    CV Status:  Stable  Pain Score: 0    Pain management: adequate       Mental Status:  Alert   Hydration Status:  Stable   PONV Controlled:  Controlled   Airway Patency:  Patent     Post Op Vitals Reviewed: Yes    No anethesia notable event occurred.    Staff: CRNA           Last Filed PACU Vitals:  Vitals Value Taken Time   Temp 99.9    Pulse 96    /57    Resp 20    SpO2 98

## 2025-03-18 NOTE — NURSING NOTE
Pt is awake,alert,tolerated diet. Feeling a little better, pain is starting to diminish,  at bedside.

## 2025-03-18 NOTE — ANESTHESIA PREPROCEDURE EVALUATION
Procedure:  FACELIFT (Face)    Relevant Problems   ANESTHESIA   (+) Delayed emergence from anesthesia   (+) PONV (postoperative nausea and vomiting)      GYN   (+) History of hysterectomy      MUSCULOSKELETAL   (+) Cervical spondylosis without myelopathy   (+) Lumbosacral spondylosis without myelopathy      NEURO/PSYCH   (+) Anxiety   (+) Chronic neck pain        Physical Exam    Airway    Mallampati score: I  TM Distance: >3 FB  Neck ROM: full     Dental   No notable dental hx     Cardiovascular  Rhythm: regular, Rate: normal, Cardiovascular exam normal    Pulmonary  Pulmonary exam normal Breath sounds clear to auscultation    Other Findings  post-pubertal.      Anesthesia Plan  ASA Score- 1     Anesthesia Type- general with ASA Monitors.         Additional Monitors:     Airway Plan: ETT.           Plan Factors-Exercise tolerance (METS): >4 METS.    Chart reviewed. EKG reviewed. Imaging results reviewed. Existing labs reviewed. Patient summary reviewed.    Patient is not a current smoker.  Patient did not smoke on day of surgery.            Induction- intravenous.    Postoperative Plan- Plan for postoperative opioid use. Planned trial extubation    Perioperative Resuscitation Plan - Level 1 - Full Code.       Informed Consent- Anesthetic plan and risks discussed with patient.  I personally reviewed this patient with the CRNA. Discussed and agreed on the Anesthesia Plan with the CRNA..      NPO Status:  Vitals Value Taken Time   Date of last liquid 03/17/25 03/18/25 0603   Time of last liquid 2345 03/18/25 0603   Date of last solid 03/17/25 03/18/25 0603   Time of last solid 1800 03/18/25 0603

## 2025-03-18 NOTE — OP NOTE
OPERATIVE REPORT  PATIENT NAME: Tiffany Hays    :  1960  MRN: [id number]  Pt Location:  OR ROOM 11    SURGERY DATE: 3/18/2025    Surgeons and Role:     * Antolin Woo MD - Primary     * Kanika Cook - Assisting    Preop Diagnosis:  Other hypertrophic disorders of the skin [L91.8]    Post-Op Diagnosis Codes:     * Other hypertrophic disorders of the skin [L91.8]    Procedure(s):  FACELIFT cosmetic    Specimen(s):  * No specimens in log *    Estimated Blood Loss:   Minimal    Drains:  Closed/Suction Drain Anterior Neck Bulb 15 Fr. (Active)   Number of days: 0       Anesthesia Type:   General    Operative Indications:  Other hypertrophic disorders of the skin [L91.8]       Operative Findings:  none      Complications:   None    Procedure and Technique:  The patient was properly identified in the operating room and placed    in the supine position on the bed. She was prepped and draped in the    usual sterile surgical fashion and anesthetized with 1% lidocaine with    epinephrine.      I first started by injecting the face and neck with 1% lidocaine with    epinephrine.  The face was marked with a marking pen.  I incised bilateral   pre and postauricular incisions.  This was performed using a 15 blade.  We   then elevated the skin up off of the underlying smas and platysma bilaterally   with facelift scissors..  Hemostasis was obtained.  We plicated the lateral   cheek, and neck fascia with interrupted 3-0 PDS suture. Once this was done,   the excess skin was pulled superiorly and posteriorly and redraped. The excess   skin was then removed with a #15 blade and electrocautery. I then closed   with deep layer with 3-0 PDS 4-0 PDS.  The skin was closed with 4-0 chromic,   6 0 chromic and 6-0 Prolene sutures. We had good symmetry after the facelift.   The same procedure was performed on both sides which will be dictated once.   Prior to complete closure a 15 Malagasy Yonatan drain was placed into the neck  and  pulled out through the lateral aspect of our incision line.  The drain was held in  place with 3-0 nylon suture.  The patient was then placed into a sterile head wrap,  awakened from surgery and taken to the recovery room in stable condition.      All counts were correct at the end of the procedure.      I was present for the entire procedure.    Patient Disposition:  PACU              SIGNATURE: Antolin Woo MD  DATE: March 18, 2025  TIME: 11:09 AM

## 2025-03-18 NOTE — NURSING NOTE
Assisted OOB to BR, voided. Writtenand verbal instructions given, including drain care, pt and her  verbalize an understanding.

## 2025-03-18 NOTE — ANESTHESIA POSTPROCEDURE EVALUATION
Post-Op Assessment Note    CV Status:  Stable  Pain Score: 0    Pain management: adequate    Multimodal analgesia used between 6 hours prior to anesthesia start to PACU discharge    Mental Status:  Alert   Hydration Status:  Stable   PONV Controlled:  None   Airway Patency:  Patent     Post Op Vitals Reviewed: Yes    No anethesia notable event occurred.    Staff: Anesthesiologist           Last Filed PACU Vitals:  Vitals Value Taken Time   Temp 99.9 °F (37.7 °C) 03/18/25 1109   Pulse 93 03/18/25 1139   /60 03/18/25 1136   Resp 14 03/18/25 1139   SpO2 98 % 03/18/25 1139   Vitals shown include unfiled device data.    Modified Jorje:     Vitals Value Taken Time   Activity 2 03/18/25 1130   Respiration 2 03/18/25 1130   Circulation 2 03/18/25 1130   Consciousness 2 03/18/25 1130   Oxygen Saturation 1 03/18/25 1130     Modified Jorje Score: 9

## 2025-03-18 NOTE — INTERVAL H&P NOTE
H&P reviewed. After examining the patient I find no changes in the patients condition since the H&P had been written.    Vitals:    03/18/25 0607   BP: 116/84   Pulse: 72   Resp: 18   Temp: 97.5 °F (36.4 °C)   SpO2: 100%

## 2025-03-18 NOTE — DISCHARGE SUMMARY
Discharge Summary - Plastic Surgery   Name: Tiffany Hays 64 y.o. female I MRN: [id number]  Unit/Bed#: OR POOL I Date of Admission: 3/18/2025   Date of Service: 3/18/2025 I Hospital Day: 0    PLASTIC, RECONSTRUCTIVE, & HAND SURGERY   Discharge Summary  Date of Admission:   3/18/2025  Date of Discharge:   03/18/25  Attending:  Antolin Woo MD  Principal/Final Diagnosis:   Other hypertrophic disorders of the skin [L91.8]  Principal Procedure:   FACELIFT (Face)  Discharge Medications:  See after visit summary for reconciled discharge medications provided to patient and family.  Reason for Admission:  Tiffany Hays was electively admitted to undergo the above named procedure on 3/18/2025 as an outpatient.  Hospital Course:  Patient underwent the above named procedure on the day of admission without complications. They were discharged home the same day.  Disposition:  To home in care of self and family.  Condition:  Good  Follow up:  Patient with follow up in the office with Dr. Antolin Woo MD in 2 day(s) or as scheduled per his office  Antolin Woo MD  3/18/2025 7:10 AM

## 2025-07-08 ENCOUNTER — TELEPHONE (OUTPATIENT)
Age: 65
End: 2025-07-08

## 2025-07-08 NOTE — TELEPHONE ENCOUNTER
Patient called stating she is super tired and has no energy.  Offered an appointment, but patient didn't think it was necessary, saying she didn't think the doctor could do anything for her by just looking at her.  However, she asked to have orders for blood work.  Please advise.

## 2025-07-11 ENCOUNTER — OFFICE VISIT (OUTPATIENT)
Dept: INTERNAL MEDICINE CLINIC | Age: 65
End: 2025-07-11
Payer: COMMERCIAL

## 2025-07-11 VITALS
WEIGHT: 102 LBS | TEMPERATURE: 98.6 F | OXYGEN SATURATION: 99 % | HEIGHT: 60 IN | BODY MASS INDEX: 20.03 KG/M2 | HEART RATE: 96 BPM | SYSTOLIC BLOOD PRESSURE: 112 MMHG | DIASTOLIC BLOOD PRESSURE: 68 MMHG

## 2025-07-11 DIAGNOSIS — Z13.220 SCREENING FOR LIPID DISORDERS: ICD-10-CM

## 2025-07-11 DIAGNOSIS — R53.82 CHRONIC FATIGUE: Primary | ICD-10-CM

## 2025-07-11 PROCEDURE — 99213 OFFICE O/P EST LOW 20 MIN: CPT

## 2025-07-11 NOTE — PROGRESS NOTES
Name: Tiffany Hays      : 1960      MRN: [id number]  Encounter Provider: Shama Rai PA-C  Encounter Date: 2025   Encounter department: [facility]  :  Assessment & Plan  Chronic fatigue  Discussed broad differential regarding patient's fatigue  Encouraged increased hydration as she is likely an calorie deficit and may benefit from increase caloric intake  Discussed adequate hydration and increasing water intake  Will order labs including CBC, TSH, vitamin D level.  Patient advised that insurance may not cover vitamin D level and she should contact them before completing lab.  If insurance does not cover vitamin D level, she will be responsible for cost of lab   Will also order Lyme's test as patient does endorse that she is outside often  For now, follow-up as needed after labs.  Follow-up in office sooner if symptoms worsen or if any new symptoms occur  Orders:    Comprehensive metabolic panel; Future    TSH, 3rd generation with Free T4 reflex; Future    CBC and differential; Future    Vitamin D 25 hydroxy; Future    Lyme Total AB W Reflex to IGM/IGG; Future    Screening for lipid disorders    Orders:    Lipid Panel with Direct LDL reflex; Future           History of Present Illness   Patient is a 64-year-old female presenting to the office for concerns of fatigue  Over the last 7 months, patient reports that she has had decreased energy compared to previous  Patient reports minimized energy throughout the day. She does not require increased naps throughout the day  Typical daily schedule: She does wake up at 4am to go the the gym for 1 hour, works from 8-5, takes 2 mile walk with dog over lunch.  Works from home typically, occasionally will go into the office  Walks 3 miles total per day, with 30 minutes of exercise at the gym  Sleep: 8 hours per night, very restful typically.  Occasionally will wake up feeling tired  Nutrition: Follows a very strict diet, 1500  calories per day, high protein  Hydration: 1 cup of water, otherwise other beverages throughout the day  Caffeine: 2-3 cups of coffee daily      Patient is a PT, currently works managing customer complaints and recalls [employer]  She does report increased stress at work    Denies anxiety, depression  She is outside often, denies known tick bites             Review of Systems   Constitutional:  Positive for fatigue. Negative for chills and fever.   HENT:  Negative for congestion, ear pain, postnasal drip, rhinorrhea, sinus pressure, sore throat and trouble swallowing.    Eyes:  Negative for visual disturbance.   Respiratory:  Negative for cough, shortness of breath and wheezing.    Cardiovascular:  Negative for chest pain, palpitations and leg swelling.   Gastrointestinal:  Positive for constipation. Negative for abdominal pain, diarrhea, nausea and vomiting.   Genitourinary:  Negative for difficulty urinating, dysuria, frequency, hematuria and urgency.   Neurological:  Negative for dizziness, weakness, light-headedness, numbness and headaches.   Psychiatric/Behavioral:  Negative for dysphoric mood and sleep disturbance. The patient is not nervous/anxious.    All other systems reviewed and are negative.      Objective   /68 (BP Location: Left arm, Patient Position: Sitting, Cuff Size: Standard)   Pulse 96   Temp 98.6 °F (37 °C) (Temporal)   Ht 5' (1.524 m)   Wt 46.3 kg (102 lb)   LMP  (LMP Unknown) Comment: Partial hysterectomy per pt  SpO2 99%   BMI 19.92 kg/m²      Physical Exam  Vitals and nursing note reviewed.   Constitutional:       General: She is not in acute distress.     Appearance: Normal appearance. She is not ill-appearing.   HENT:      Head: Normocephalic and atraumatic.      Right Ear: External ear normal.      Left Ear: External ear normal.      Nose: Nose normal.      Mouth/Throat:      Mouth: Mucous membranes are moist.      Pharynx: Oropharynx is clear.     Eyes:      General: No  scleral icterus.     Extraocular Movements: Extraocular movements intact.      Conjunctiva/sclera: Conjunctivae normal.      Pupils: Pupils are equal, round, and reactive to light.     Neck:      Thyroid: No thyroid mass or thyromegaly.     Cardiovascular:      Rate and Rhythm: Normal rate and regular rhythm.      Heart sounds: Normal heart sounds. No murmur heard.     No friction rub. No gallop.   Pulmonary:      Effort: Pulmonary effort is normal. No respiratory distress.      Breath sounds: Normal breath sounds. No wheezing, rhonchi or rales.   Chest:      Chest wall: No tenderness.     Musculoskeletal:         General: Normal range of motion.      Cervical back: Normal range of motion. No tenderness.      Right lower leg: No edema.      Left lower leg: No edema.     Skin:     General: Skin is warm and dry.      Coloration: Skin is not pale.      Findings: No erythema or rash.     Neurological:      General: No focal deficit present.      Mental Status: She is alert and oriented to person, place, and time. Mental status is at baseline.      Cranial Nerves: No cranial nerve deficit.      Motor: No weakness.      Coordination: Coordination normal.     Psychiatric:         Mood and Affect: Mood normal.         Behavior: Behavior normal.           Disclaimer: This note was generated with voice recognition software.  Phonetic, grammatical, and spelling errors may be present as a result.  Please contact provider with any concerns or questions

## 2025-07-16 NOTE — TELEPHONE ENCOUNTER
Patient notified. She says she will be paying for her labs out of pocket so no need for the cpt codes anymore.     No further action needed

## 2025-07-21 ENCOUNTER — APPOINTMENT (OUTPATIENT)
Dept: LAB | Age: 65
End: 2025-07-21
Payer: COMMERCIAL

## 2025-07-21 DIAGNOSIS — R53.82 CHRONIC FATIGUE: ICD-10-CM

## 2025-07-21 DIAGNOSIS — Z13.220 SCREENING FOR LIPID DISORDERS: ICD-10-CM

## 2025-07-21 LAB
25(OH)D3 SERPL-MCNC: 38.3 NG/ML (ref 30–100)
ALBUMIN SERPL BCG-MCNC: 4.6 G/DL (ref 3.5–5)
ALP SERPL-CCNC: 53 U/L (ref 34–104)
ALT SERPL W P-5'-P-CCNC: 21 U/L (ref 7–52)
ANION GAP SERPL CALCULATED.3IONS-SCNC: 10 MMOL/L (ref 4–13)
AST SERPL W P-5'-P-CCNC: 27 U/L (ref 13–39)
B BURGDOR IGG+IGM SER QL IA: NEGATIVE
BASOPHILS # BLD AUTO: 0.04 THOUSANDS/ÂΜL (ref 0–0.1)
BASOPHILS NFR BLD AUTO: 1 % (ref 0–1)
BILIRUB SERPL-MCNC: 0.76 MG/DL (ref 0.2–1)
BUN SERPL-MCNC: 12 MG/DL (ref 5–25)
CALCIUM SERPL-MCNC: 9.9 MG/DL (ref 8.4–10.2)
CHLORIDE SERPL-SCNC: 102 MMOL/L (ref 96–108)
CHOLEST SERPL-MCNC: 190 MG/DL (ref ?–200)
CO2 SERPL-SCNC: 27 MMOL/L (ref 21–32)
CREAT SERPL-MCNC: 0.72 MG/DL (ref 0.6–1.3)
EOSINOPHIL # BLD AUTO: 0.09 THOUSAND/ÂΜL (ref 0–0.61)
EOSINOPHIL NFR BLD AUTO: 1 % (ref 0–6)
ERYTHROCYTE [DISTWIDTH] IN BLOOD BY AUTOMATED COUNT: 11.4 % (ref 11.6–15.1)
GFR SERPL CREATININE-BSD FRML MDRD: 89 ML/MIN/1.73SQ M
GLUCOSE P FAST SERPL-MCNC: 95 MG/DL (ref 65–99)
HCT VFR BLD AUTO: 43.2 % (ref 34.8–46.1)
HDLC SERPL-MCNC: 82 MG/DL
HGB BLD-MCNC: 14.3 G/DL (ref 11.5–15.4)
IMM GRANULOCYTES # BLD AUTO: 0.03 THOUSAND/UL (ref 0–0.2)
IMM GRANULOCYTES NFR BLD AUTO: 0 % (ref 0–2)
LDLC SERPL CALC-MCNC: 93 MG/DL (ref 0–100)
LYMPHOCYTES # BLD AUTO: 1.93 THOUSANDS/ÂΜL (ref 0.6–4.47)
LYMPHOCYTES NFR BLD AUTO: 24 % (ref 14–44)
MCH RBC QN AUTO: 32.1 PG (ref 26.8–34.3)
MCHC RBC AUTO-ENTMCNC: 33.1 G/DL (ref 31.4–37.4)
MCV RBC AUTO: 97 FL (ref 82–98)
MONOCYTES # BLD AUTO: 0.81 THOUSAND/ÂΜL (ref 0.17–1.22)
MONOCYTES NFR BLD AUTO: 10 % (ref 4–12)
NEUTROPHILS # BLD AUTO: 5.1 THOUSANDS/ÂΜL (ref 1.85–7.62)
NEUTS SEG NFR BLD AUTO: 64 % (ref 43–75)
NRBC BLD AUTO-RTO: 0 /100 WBCS
PLATELET # BLD AUTO: 259 THOUSANDS/UL (ref 149–390)
PMV BLD AUTO: 11.5 FL (ref 8.9–12.7)
POTASSIUM SERPL-SCNC: 4.4 MMOL/L (ref 3.5–5.3)
PROT SERPL-MCNC: 7.4 G/DL (ref 6.4–8.4)
RBC # BLD AUTO: 4.46 MILLION/UL (ref 3.81–5.12)
SODIUM SERPL-SCNC: 139 MMOL/L (ref 135–147)
TRIGL SERPL-MCNC: 75 MG/DL (ref ?–150)
TSH SERPL DL<=0.05 MIU/L-ACNC: 1.44 UIU/ML (ref 0.45–4.5)
WBC # BLD AUTO: 8 THOUSAND/UL (ref 4.31–10.16)

## 2025-07-21 PROCEDURE — 85025 COMPLETE CBC W/AUTO DIFF WBC: CPT

## 2025-07-21 PROCEDURE — 36415 COLL VENOUS BLD VENIPUNCTURE: CPT

## 2025-07-21 PROCEDURE — 80061 LIPID PANEL: CPT

## 2025-07-21 PROCEDURE — 82306 VITAMIN D 25 HYDROXY: CPT

## 2025-07-21 PROCEDURE — 86618 LYME DISEASE ANTIBODY: CPT

## 2025-07-21 PROCEDURE — 80053 COMPREHEN METABOLIC PANEL: CPT

## 2025-07-21 PROCEDURE — 84443 ASSAY THYROID STIM HORMONE: CPT

## (undated) DEVICE — NEEDLE 25G X 1 1/2

## (undated) DEVICE — SCD SEQUENTIAL COMPRESSION COMFORT SLEEVE MEDIUM KNEE LENGTH: Brand: KENDALL SCD

## (undated) DEVICE — BETHLEHEM UNIVERSAL OUTPATIENT: Brand: CARDINAL HEALTH

## (undated) DEVICE — GLOVE SRG LF STRL BGL SKNSNS 6.5 PF

## (undated) DEVICE — INTENDED FOR TISSUE SEPARATION, AND OTHER PROCEDURES THAT REQUIRE A SHARP SURGICAL BLADE TO PUNCTURE OR CUT.: Brand: BARD-PARKER ® SAFETYLOCK CARBON RIB-BACK BLADES

## (undated) DEVICE — SUT ETHILON 6-0 P-3 18 IN 1698G

## (undated) DEVICE — SUT PROLENE 6-0 P-3 18 IN 8695G

## (undated) DEVICE — NEEDLE BLUNT 18 G X 1 1/2IN

## (undated) DEVICE — PENCIL ELECTROSURG E-Z CLEAN -0035H

## (undated) DEVICE — STERILE POLYISOPRENE POWDER-FREE SURGICAL GLOVES: Brand: PROTEXIS

## (undated) DEVICE — UNDYED MONOFILAMENT (POLYDIOXANONE), ABSORBABLE SURGICAL SUTURE: Brand: PDS

## (undated) DEVICE — STANDARD SURGICAL GOWN, L: Brand: CONVERTORS

## (undated) DEVICE — SUT CHROMIC 6-0 790G

## (undated) DEVICE — ACE WRAP 4 IN UNSTERILE

## (undated) DEVICE — SURGICAL CLIPPER BLADE GENERAL USE

## (undated) DEVICE — SYRINGE 30ML LL

## (undated) DEVICE — SKIN MARKER DUAL TIP WITH RULER CAP, FLEXIBLE RULER AND LABELS: Brand: DEVON

## (undated) DEVICE — ELECTRODE NEEDLE MOD E-Z CLEAN 2.75IN 7CM -0013M

## (undated) DEVICE — ICE PACK EYE

## (undated) DEVICE — 3M™ STERI-STRIP™ REINFORCED ADHESIVE SKIN CLOSURES, R1547, 1/2 IN X 4 IN (12 MM X 100 MM), 6 STRIPS/ENVELOPE: Brand: 3M™ STERI-STRIP™

## (undated) DEVICE — BASIC PACK: Brand: CONVERTORS

## (undated) DEVICE — Device

## (undated) DEVICE — MEDI-VAC YANK SUCT HNDL W/TPRD BULBOUS TIP: Brand: CARDINAL HEALTH

## (undated) DEVICE — TIBURON SPLIT SHEET: Brand: CONVERTORS

## (undated) DEVICE — SINGLE PORT MANIFOLD: Brand: NEPTUNE 2

## (undated) DEVICE — PREMIUM DRY TRAY LF: Brand: MEDLINE INDUSTRIES, INC.

## (undated) DEVICE — OCCLUSIVE GAUZE STRIP,3% BISMUTH TRIBROMOPHENATE IN PETROLATUM BLEND: Brand: XEROFORM

## (undated) DEVICE — TELFA NON-ADHERENT ABSORBENT DRESSING: Brand: TELFA

## (undated) DEVICE — DISPOSABLE OR TOWEL: Brand: CARDINAL HEALTH

## (undated) DEVICE — LAPAROTOMY SPONGE - RF AND X-RAY DETECTABLE PRE-WASHED: Brand: SITUATE

## (undated) DEVICE — BASIC SINGLE BASIN-LF: Brand: MEDLINE INDUSTRIES, INC.

## (undated) DEVICE — 1820 FOAM BLOCK NEEDLE COUNTER: Brand: DEVON

## (undated) DEVICE — SUT VICRYL 6-0 P-3 18 IN J492G

## (undated) DEVICE — SUPER SPONGES,MEDIUM: Brand: KERLIX

## (undated) DEVICE — SUT MONOCRYL PLUS 4-0 PS-2 18 IN MCP496G

## (undated) DEVICE — LUBRICANT JELLY SURGILUBE TUBE 4OZ FLIP TOP

## (undated) DEVICE — KERLIX BANDAGE ROLL: Brand: KERLIX

## (undated) DEVICE — REM POLYHESIVE ADULT PATIENT RETURN ELECTRODE: Brand: VALLEYLAB

## (undated) DEVICE — SUT ETHILON 3-0 PS-1 18 IN 1663H

## (undated) DEVICE — ASTOUND STANDARD SURGICAL GOWN, XXL: Brand: CONVERTORS

## (undated) DEVICE — SYRINGE 10ML LL

## (undated) DEVICE — 40529 DERMAPROX PAD 11'' X 15'' X 1'': Brand: 40529 DERMAPROX PAD 11'' X 15'' X 1''

## (undated) DEVICE — LIGHT GLOVE GREEN

## (undated) DEVICE — SUT PDS II 3-0 SH 27 IN Z316H

## (undated) DEVICE — SPONGE 4 X 4 XRAY 16 PLY STRL LF RFD

## (undated) DEVICE — ANTIBACTERIAL UNDYED BRAIDED (POLYGLACTIN 910), SYNTHETIC ABSORBABLE SUTURE: Brand: COATED VICRYL

## (undated) DEVICE — TUBING SUCTION 5MM X 12 FT